# Patient Record
Sex: FEMALE | Race: WHITE | NOT HISPANIC OR LATINO | Employment: OTHER | URBAN - NONMETROPOLITAN AREA
[De-identification: names, ages, dates, MRNs, and addresses within clinical notes are randomized per-mention and may not be internally consistent; named-entity substitution may affect disease eponyms.]

---

## 2017-01-12 ENCOUNTER — COMMUNICATION - GICH (OUTPATIENT)
Dept: FAMILY MEDICINE | Facility: OTHER | Age: 82
End: 2017-01-12

## 2017-01-12 DIAGNOSIS — I10 ESSENTIAL (PRIMARY) HYPERTENSION: ICD-10-CM

## 2017-01-16 ENCOUNTER — OFFICE VISIT - GICH (OUTPATIENT)
Dept: FAMILY MEDICINE | Facility: OTHER | Age: 82
End: 2017-01-16

## 2017-01-16 DIAGNOSIS — L57.0 ACTINIC KERATOSIS: ICD-10-CM

## 2017-01-16 DIAGNOSIS — F34.1 DYSTHYMIC DISORDER: ICD-10-CM

## 2017-01-16 DIAGNOSIS — R10.13 EPIGASTRIC PAIN: ICD-10-CM

## 2017-01-16 DIAGNOSIS — I10 ESSENTIAL (PRIMARY) HYPERTENSION: ICD-10-CM

## 2017-01-16 LAB
A/G RATIO - HISTORICAL: 1.1 (ref 1–2)
ABSOLUTE BASOPHILS - HISTORICAL: 0.1 THOU/CU MM
ABSOLUTE EOSINOPHILS - HISTORICAL: 0.3 THOU/CU MM
ABSOLUTE LYMPHOCYTES - HISTORICAL: 1.5 THOU/CU MM (ref 0.9–2.9)
ABSOLUTE MONOCYTES - HISTORICAL: 0.6 THOU/CU MM
ABSOLUTE NEUTROPHILS - HISTORICAL: 5.5 THOU/CU MM (ref 1.7–7)
ALBUMIN SERPL-MCNC: 3.7 G/DL (ref 3.5–5.7)
ALP SERPL-CCNC: 83 IU/L (ref 34–104)
ALT (SGPT) - HISTORICAL: 15 IU/L (ref 7–52)
ANION GAP - HISTORICAL: 11 (ref 5–18)
AST SERPL-CCNC: 14 IU/L (ref 13–39)
BASOPHILS # BLD AUTO: 0.9 %
BILIRUB SERPL-MCNC: 0.3 MG/DL (ref 0.3–1)
BUN SERPL-MCNC: 26 MG/DL (ref 7–25)
BUN/CREAT RATIO - HISTORICAL: 29
CALCIUM SERPL-MCNC: 9.6 MG/DL (ref 8.6–10.3)
CHLORIDE SERPLBLD-SCNC: 106 MMOL/L (ref 98–107)
CO2 SERPL-SCNC: 24 MMOL/L (ref 21–31)
CREAT SERPL-MCNC: 0.91 MG/DL (ref 0.7–1.3)
EOSINOPHIL NFR BLD AUTO: 4 %
ERYTHROCYTE [DISTWIDTH] IN BLOOD BY AUTOMATED COUNT: 15.5 % (ref 11.5–15.5)
GFR IF NOT AFRICAN AMERICAN - HISTORICAL: 59 ML/MIN/1.73M2
GLOBULIN - HISTORICAL: 3.3 G/DL (ref 2–3.7)
GLUCOSE SERPL-MCNC: 132 MG/DL (ref 70–105)
HCT VFR BLD AUTO: 39.9 % (ref 33–51)
HEMOGLOBIN: 12.5 G/DL (ref 12–16)
LYMPHOCYTES NFR BLD AUTO: 18.8 % (ref 20–44)
MCH RBC QN AUTO: 27 PG (ref 26–34)
MCHC RBC AUTO-ENTMCNC: 31.2 G/DL (ref 32–36)
MCV RBC AUTO: 86 FL (ref 80–100)
MONOCYTES NFR BLD AUTO: 7.7 %
NEUTROPHILS NFR BLD AUTO: 68.6 % (ref 42–72)
PLATELET # BLD AUTO: 277 THOU/CU MM (ref 140–440)
PMV BLD: 7.3 FL (ref 6.5–11)
POTASSIUM SERPL-SCNC: 4.7 MMOL/L (ref 3.5–5.1)
PROT SERPL-MCNC: 7 G/DL (ref 6.4–8.9)
RED BLOOD COUNT - HISTORICAL: 4.62 MIL/CU MM (ref 4–5.2)
SODIUM SERPL-SCNC: 141 MMOL/L (ref 133–143)
WHITE BLOOD COUNT - HISTORICAL: 8 THOU/CU MM (ref 4.5–11)

## 2017-01-16 ASSESSMENT — PATIENT HEALTH QUESTIONNAIRE - PHQ9: SUM OF ALL RESPONSES TO PHQ QUESTIONS 1-9: 13

## 2017-02-13 ENCOUNTER — OFFICE VISIT - GICH (OUTPATIENT)
Dept: FAMILY MEDICINE | Facility: OTHER | Age: 82
End: 2017-02-13

## 2017-02-13 DIAGNOSIS — F32.9 MAJOR DEPRESSIVE DISORDER, SINGLE EPISODE: ICD-10-CM

## 2017-02-13 ASSESSMENT — PATIENT HEALTH QUESTIONNAIRE - PHQ9: SUM OF ALL RESPONSES TO PHQ QUESTIONS 1-9: 13

## 2017-03-06 ENCOUNTER — OFFICE VISIT - GICH (OUTPATIENT)
Dept: FAMILY MEDICINE | Facility: OTHER | Age: 82
End: 2017-03-06

## 2017-03-06 DIAGNOSIS — R42 DIZZINESS AND GIDDINESS: ICD-10-CM

## 2017-03-06 DIAGNOSIS — F34.1 DYSTHYMIC DISORDER: ICD-10-CM

## 2017-03-06 DIAGNOSIS — I10 ESSENTIAL (PRIMARY) HYPERTENSION: ICD-10-CM

## 2017-03-10 ENCOUNTER — COMMUNICATION - GICH (OUTPATIENT)
Dept: FAMILY MEDICINE | Facility: OTHER | Age: 82
End: 2017-03-10

## 2017-03-13 ENCOUNTER — OFFICE VISIT - GICH (OUTPATIENT)
Dept: FAMILY MEDICINE | Facility: OTHER | Age: 82
End: 2017-03-13

## 2017-03-13 DIAGNOSIS — I10 ESSENTIAL (PRIMARY) HYPERTENSION: ICD-10-CM

## 2017-03-28 ENCOUNTER — COMMUNICATION - GICH (OUTPATIENT)
Dept: FAMILY MEDICINE | Facility: OTHER | Age: 82
End: 2017-03-28

## 2017-04-03 ENCOUNTER — HOSPITAL ENCOUNTER (OUTPATIENT)
Dept: PHYSICAL THERAPY | Facility: OTHER | Age: 82
Setting detail: THERAPIES SERIES
End: 2017-04-03
Attending: FAMILY MEDICINE

## 2017-04-03 DIAGNOSIS — I10 ESSENTIAL (PRIMARY) HYPERTENSION: ICD-10-CM

## 2017-04-05 ENCOUNTER — HOSPITAL ENCOUNTER (OUTPATIENT)
Dept: PHYSICAL THERAPY | Facility: OTHER | Age: 82
Setting detail: THERAPIES SERIES
End: 2017-04-05
Attending: FAMILY MEDICINE

## 2017-04-10 ENCOUNTER — HOSPITAL ENCOUNTER (OUTPATIENT)
Dept: PHYSICAL THERAPY | Facility: OTHER | Age: 82
Setting detail: THERAPIES SERIES
End: 2017-04-10
Attending: FAMILY MEDICINE

## 2017-04-12 ENCOUNTER — HISTORY (OUTPATIENT)
Dept: EMERGENCY MEDICINE | Facility: OTHER | Age: 82
End: 2017-04-12

## 2017-04-13 ENCOUNTER — AMBULATORY - GICH (OUTPATIENT)
Dept: ORTHOPEDICS | Facility: OTHER | Age: 82
End: 2017-04-13

## 2017-04-13 ENCOUNTER — HISTORY (OUTPATIENT)
Dept: ORTHOPEDICS | Facility: OTHER | Age: 82
End: 2017-04-13

## 2017-04-13 ENCOUNTER — COMMUNICATION - GICH (OUTPATIENT)
Dept: ORTHOPEDICS | Facility: OTHER | Age: 82
End: 2017-04-13

## 2017-04-17 ENCOUNTER — OFFICE VISIT - GICH (OUTPATIENT)
Dept: ORTHOPEDICS | Facility: OTHER | Age: 82
End: 2017-04-17

## 2017-04-17 ENCOUNTER — HISTORY (OUTPATIENT)
Dept: ORTHOPEDICS | Facility: OTHER | Age: 82
End: 2017-04-17

## 2017-04-17 DIAGNOSIS — S52.531D CLOSED COLLES' FRACTURE OF RIGHT RADIUS WITH ROUTINE HEALING: ICD-10-CM

## 2017-04-18 ENCOUNTER — HISTORY (OUTPATIENT)
Dept: SURGERY | Facility: OTHER | Age: 82
End: 2017-04-18

## 2017-04-20 ENCOUNTER — AMBULATORY - GICH (OUTPATIENT)
Dept: RADIOLOGY | Facility: OTHER | Age: 82
End: 2017-04-20

## 2017-04-20 ENCOUNTER — TRANSFERRED RECORDS (OUTPATIENT)
Dept: HEALTH INFORMATION MANAGEMENT | Facility: CLINIC | Age: 82
End: 2017-04-20

## 2017-04-20 ENCOUNTER — AMBULATORY - GICH (OUTPATIENT)
Dept: FAMILY MEDICINE | Facility: OTHER | Age: 82
End: 2017-04-20

## 2017-04-20 DIAGNOSIS — I10 ESSENTIAL (PRIMARY) HYPERTENSION: ICD-10-CM

## 2017-04-20 DIAGNOSIS — S62.101D: ICD-10-CM

## 2017-04-20 DIAGNOSIS — S52.90XA CLOSED FRACTURE OF FOREARM: ICD-10-CM

## 2017-04-20 LAB
INR - HISTORICAL: 1.1
PROTIME - HISTORICAL: 11.3 SEC (ref 11.9–15.2)

## 2017-04-21 ENCOUNTER — HISTORY (OUTPATIENT)
Dept: SURGERY | Facility: OTHER | Age: 82
End: 2017-04-21

## 2017-04-21 ENCOUNTER — HOSPITAL ENCOUNTER (OUTPATIENT)
Dept: SURGERY | Facility: OTHER | Age: 82
Discharge: HOME | End: 2017-04-21
Attending: ORTHOPAEDIC SURGERY | Admitting: ORTHOPAEDIC SURGERY

## 2017-04-21 ENCOUNTER — SURGERY (OUTPATIENT)
Dept: SURGERY | Facility: OTHER | Age: 82
End: 2017-04-21

## 2017-04-21 DIAGNOSIS — Z96.7 PRESENCE OF OTHER BONE AND TENDON IMPLANTS: ICD-10-CM

## 2017-04-21 DIAGNOSIS — Z87.81 PERSONAL HISTORY OF HEALED TRAUMATIC FRACTURE: ICD-10-CM

## 2017-04-21 DIAGNOSIS — S52.90XA CLOSED FRACTURE OF FOREARM: ICD-10-CM

## 2017-05-01 ENCOUNTER — OFFICE VISIT - GICH (OUTPATIENT)
Dept: ORTHOPEDICS | Facility: OTHER | Age: 82
End: 2017-05-01

## 2017-05-01 ENCOUNTER — HISTORY (OUTPATIENT)
Dept: ORTHOPEDICS | Facility: OTHER | Age: 82
End: 2017-05-01

## 2017-05-01 DIAGNOSIS — Z96.7 PRESENCE OF OTHER BONE AND TENDON IMPLANTS: ICD-10-CM

## 2017-05-01 DIAGNOSIS — Z87.81 PERSONAL HISTORY OF HEALED TRAUMATIC FRACTURE: ICD-10-CM

## 2017-05-15 ENCOUNTER — AMBULATORY - GICH (OUTPATIENT)
Dept: SCHEDULING | Facility: OTHER | Age: 82
End: 2017-05-15

## 2017-05-15 ENCOUNTER — COMMUNICATION - GICH (OUTPATIENT)
Dept: FAMILY MEDICINE | Facility: OTHER | Age: 82
End: 2017-05-15

## 2017-05-15 DIAGNOSIS — F34.1 DYSTHYMIC DISORDER: ICD-10-CM

## 2017-05-24 ENCOUNTER — AMBULATORY - GICH (OUTPATIENT)
Dept: ORTHOPEDICS | Facility: OTHER | Age: 82
End: 2017-05-24

## 2017-05-24 DIAGNOSIS — Z87.81 PERSONAL HISTORY OF HEALED TRAUMATIC FRACTURE: ICD-10-CM

## 2017-05-24 DIAGNOSIS — Z96.7 PRESENCE OF OTHER BONE AND TENDON IMPLANTS: ICD-10-CM

## 2017-05-26 ENCOUNTER — AMBULATORY - GICH (OUTPATIENT)
Dept: ORTHOPEDICS | Facility: OTHER | Age: 82
End: 2017-05-26

## 2017-05-26 ENCOUNTER — OFFICE VISIT - GICH (OUTPATIENT)
Dept: ORTHOPEDICS | Facility: OTHER | Age: 82
End: 2017-05-26

## 2017-05-26 ENCOUNTER — HOSPITAL ENCOUNTER (OUTPATIENT)
Dept: RADIOLOGY | Facility: OTHER | Age: 82
End: 2017-05-26
Attending: ORTHOPAEDIC SURGERY

## 2017-05-26 ENCOUNTER — HISTORY (OUTPATIENT)
Dept: ORTHOPEDICS | Facility: OTHER | Age: 82
End: 2017-05-26

## 2017-05-26 DIAGNOSIS — Z96.7 PRESENCE OF OTHER BONE AND TENDON IMPLANTS: ICD-10-CM

## 2017-05-26 DIAGNOSIS — Z87.81 PERSONAL HISTORY OF HEALED TRAUMATIC FRACTURE: ICD-10-CM

## 2017-06-15 ENCOUNTER — AMBULATORY - GICH (OUTPATIENT)
Dept: ORTHOPEDICS | Facility: OTHER | Age: 82
End: 2017-06-15

## 2017-06-15 DIAGNOSIS — Z87.81 PERSONAL HISTORY OF HEALED TRAUMATIC FRACTURE: ICD-10-CM

## 2017-06-15 DIAGNOSIS — Z96.7 PRESENCE OF OTHER BONE AND TENDON IMPLANTS: ICD-10-CM

## 2017-06-23 ENCOUNTER — OFFICE VISIT - GICH (OUTPATIENT)
Dept: ORTHOPEDICS | Facility: OTHER | Age: 82
End: 2017-06-23

## 2017-06-23 ENCOUNTER — HOSPITAL ENCOUNTER (OUTPATIENT)
Dept: RADIOLOGY | Facility: OTHER | Age: 82
End: 2017-06-23
Attending: ORTHOPAEDIC SURGERY

## 2017-06-23 ENCOUNTER — HISTORY (OUTPATIENT)
Dept: ORTHOPEDICS | Facility: OTHER | Age: 82
End: 2017-06-23

## 2017-06-23 DIAGNOSIS — Z87.81 PERSONAL HISTORY OF HEALED TRAUMATIC FRACTURE: ICD-10-CM

## 2017-06-23 DIAGNOSIS — Z96.7 PRESENCE OF OTHER BONE AND TENDON IMPLANTS: ICD-10-CM

## 2017-06-27 ENCOUNTER — HISTORY (OUTPATIENT)
Dept: EMERGENCY MEDICINE | Facility: OTHER | Age: 82
End: 2017-06-27

## 2017-06-27 ENCOUNTER — TRANSFERRED RECORDS (OUTPATIENT)
Dept: HEALTH INFORMATION MANAGEMENT | Facility: CLINIC | Age: 82
End: 2017-06-27

## 2017-07-18 ENCOUNTER — COMMUNICATION - GICH (OUTPATIENT)
Dept: FAMILY MEDICINE | Facility: OTHER | Age: 82
End: 2017-07-18

## 2017-07-18 DIAGNOSIS — I10 ESSENTIAL (PRIMARY) HYPERTENSION: ICD-10-CM

## 2017-08-21 ENCOUNTER — HISTORY (OUTPATIENT)
Dept: FAMILY MEDICINE | Facility: OTHER | Age: 82
End: 2017-08-21

## 2017-08-21 ENCOUNTER — TRANSFERRED RECORDS (OUTPATIENT)
Dept: HEALTH INFORMATION MANAGEMENT | Facility: CLINIC | Age: 82
End: 2017-08-21

## 2017-08-21 ENCOUNTER — OFFICE VISIT - GICH (OUTPATIENT)
Dept: FAMILY MEDICINE | Facility: OTHER | Age: 82
End: 2017-08-21

## 2017-08-21 ENCOUNTER — AMBULATORY - GICH (OUTPATIENT)
Dept: FAMILY MEDICINE | Facility: OTHER | Age: 82
End: 2017-08-21

## 2017-08-21 DIAGNOSIS — F34.1 DYSTHYMIC DISORDER: ICD-10-CM

## 2017-08-21 DIAGNOSIS — R93.5 ABNORMAL FINDINGS ON DIAGNOSTIC IMAGING OF OTHER ABDOMINAL REGIONS, INCLUDING RETROPERITONEUM: ICD-10-CM

## 2017-08-21 ASSESSMENT — PATIENT HEALTH QUESTIONNAIRE - PHQ9: SUM OF ALL RESPONSES TO PHQ QUESTIONS 1-9: 9

## 2017-08-22 ENCOUNTER — HOSPITAL ENCOUNTER (OUTPATIENT)
Dept: RADIOLOGY | Facility: OTHER | Age: 82
End: 2017-08-22
Attending: FAMILY MEDICINE

## 2017-08-22 ENCOUNTER — TRANSFERRED RECORDS (OUTPATIENT)
Dept: HEALTH INFORMATION MANAGEMENT | Facility: CLINIC | Age: 82
End: 2017-08-22

## 2017-08-22 ENCOUNTER — MEDICAL CORRESPONDENCE (OUTPATIENT)
Dept: HEALTH INFORMATION MANAGEMENT | Facility: CLINIC | Age: 82
End: 2017-08-22

## 2017-08-22 ENCOUNTER — AMBULATORY - GICH (OUTPATIENT)
Dept: FAMILY MEDICINE | Facility: OTHER | Age: 82
End: 2017-08-22

## 2017-08-22 DIAGNOSIS — R93.5 ABNORMAL FINDINGS ON DIAGNOSTIC IMAGING OF OTHER ABDOMINAL REGIONS, INCLUDING RETROPERITONEUM: ICD-10-CM

## 2017-08-24 ENCOUNTER — CARE COORDINATION (OUTPATIENT)
Dept: GASTROENTEROLOGY | Facility: CLINIC | Age: 82
End: 2017-08-24

## 2017-08-24 NOTE — PROGRESS NOTES
[facility] Intake:    Referring/Requesting provider and Health care System: Dr Daniel Beyer from [facility] contact - Name Fanny, Phone [phone] and Fax number [phone]     Requested provider (if specified): any     Has patient been evaluated in clinic or had a procedure [facility] provider in the last 5 years: no       Indication/Diagnosis for consultation: 1.3cm low attenuation lesion adjacent to tail of pancreas, possible cystic neoplasm     Is diagnosis on list of approved diagnosis: yes     Has patient been evaluated by another Gastroenterologist? No, but the staff general surgeon at [facility] looked at pt's CT     Imaging completed:     CT scan     yes   MRI       no     Procedures:     Upper Endoscopy/EGD no     Endoscopic Ultrasound/EUS            no     ERCP no     Colonoscopy no     Are images able/being pushed to our system? no     Is patient aware of request for clinc consultation and ok to be contacted to schedule? Yes     Referral Received: 8/24/2017    Hard Copy chart created/ Records received: 9/7/2017    MD review date:                             Clinical History (per RN review of records provided):   Referred for 1.3 cm abnormality in the tail of pancreas found on CT.       Recommendations/Orders:    Hard copy chart review by Dr. Laboy  1. Clinic   2. Block time in the OR for EUS  Within 1 month.     9/13/2017: Left message for Mervat to call back to discuss the plan.       Celine SUBRAMANIAN, RN Coordinator  Dr. Ventura, Dr. Anderson & Dr. Laboy   [facility]  [phone]

## 2017-08-28 ENCOUNTER — HISTORY (OUTPATIENT)
Dept: FAMILY MEDICINE | Facility: OTHER | Age: 82
End: 2017-08-28

## 2017-08-28 ENCOUNTER — OFFICE VISIT - GICH (OUTPATIENT)
Dept: FAMILY MEDICINE | Facility: OTHER | Age: 82
End: 2017-08-28

## 2017-08-28 DIAGNOSIS — K86.9 DISEASE OF PANCREAS: ICD-10-CM

## 2017-08-29 NOTE — PROGRESS NOTES
One disc received and taken down to be uploaded  CT from 4/22/17 and 8/22/2017      23 Pages of Medical records received.  Hard copy folder created  Handed over to RNCC on 8/30    SR 08/29/2017  219p

## 2017-09-14 ENCOUNTER — CARE COORDINATION (OUTPATIENT)
Dept: GASTROENTEROLOGY | Facility: CLINIC | Age: 82
End: 2017-09-14

## 2017-09-14 ENCOUNTER — COMMUNICATION - GICH (OUTPATIENT)
Dept: FAMILY MEDICINE | Facility: OTHER | Age: 82
End: 2017-09-14

## 2017-09-14 DIAGNOSIS — K86.9 PANCREATIC LESION: Primary | ICD-10-CM

## 2017-09-14 NOTE — PROGRESS NOTES
Called and spoke to Mervat, she is aware of plan and amenable to plan for clinic and EUS. She is aware will need to get pre-op physical in the event it is decided that she will need EUS. She plans to get pre-op done locally.     She will expect a call for date and time for procedure.     Order placed for EUS.     Celine SUBRAMANIAN RN Coordinator  Dr. Ventura, Dr. Anderson & Dr. Laboy   [facility]  [phone]

## 2017-09-15 ENCOUNTER — CARE COORDINATION (OUTPATIENT)
Dept: GASTROENTEROLOGY | Facility: CLINIC | Age: 82
End: 2017-09-15

## 2017-09-15 NOTE — PROGRESS NOTES
Received call from Angelo: he is looking for procedure.   Advised: explained to his wife yesterday that we would be calling her/him with date and time within the next couple of days.   Verbalized understanding and will expect call with date and time.     Celine SUBRAMANIAN RN Coordinator  Dr. Ventura, Dr. Anderson & Dr. Laboy   [facility]  [phone]

## 2017-09-20 ENCOUNTER — TELEPHONE (OUTPATIENT)
Dept: GASTROENTEROLOGY | Facility: CLINIC | Age: 82
End: 2017-09-20

## 2017-09-20 NOTE — TELEPHONE ENCOUNTER
Angelo and Mervat are informed that she is scheduled on 11/02/2017 for a clinic consult with Dr. Thurman.   They are informed that an EUS may be warranted after clinic consult.   I advised that we can get Mervat in for her H&P here at the PAC office on 11/2 or they can have one done locally for this procedure.  Angelo will be accompanying Mervat to all appointments.   He asked me to send lodging information along with a map to get here.   Their address was confirmed during this call to be current.    SR 09/20/2017  303p

## 2017-10-17 ENCOUNTER — TRANSFERRED RECORDS (OUTPATIENT)
Dept: HEALTH INFORMATION MANAGEMENT | Facility: CLINIC | Age: 82
End: 2017-10-17

## 2017-10-17 ENCOUNTER — AMBULATORY - GICH (OUTPATIENT)
Dept: FAMILY MEDICINE | Facility: OTHER | Age: 82
End: 2017-10-17

## 2017-10-17 ENCOUNTER — OFFICE VISIT - GICH (OUTPATIENT)
Dept: FAMILY MEDICINE | Facility: OTHER | Age: 82
End: 2017-10-17

## 2017-10-17 ENCOUNTER — HISTORY (OUTPATIENT)
Dept: FAMILY MEDICINE | Facility: OTHER | Age: 82
End: 2017-10-17

## 2017-10-17 DIAGNOSIS — F34.1 DYSTHYMIC DISORDER: ICD-10-CM

## 2017-10-17 DIAGNOSIS — I10 ESSENTIAL (PRIMARY) HYPERTENSION: ICD-10-CM

## 2017-10-17 DIAGNOSIS — K86.9 DISEASE OF PANCREAS: ICD-10-CM

## 2017-10-25 ENCOUNTER — TELEPHONE (OUTPATIENT)
Dept: GASTROENTEROLOGY | Facility: CLINIC | Age: 82
End: 2017-10-25

## 2017-10-25 NOTE — TELEPHONE ENCOUNTER
Attempted to reach patient regarding upcoming appointment 11/2 with Dr. Thurman. Patient unavailable, unable to leave a message.

## 2017-11-01 ENCOUNTER — TELEPHONE (OUTPATIENT)
Dept: GASTROENTEROLOGY | Facility: CLINIC | Age: 82
End: 2017-11-01

## 2017-11-02 ENCOUNTER — OFFICE VISIT (OUTPATIENT)
Dept: GASTROENTEROLOGY | Facility: CLINIC | Age: 82
End: 2017-11-02

## 2017-11-02 ENCOUNTER — AMBULATORY - GICH (OUTPATIENT)
Dept: SCHEDULING | Facility: OTHER | Age: 82
End: 2017-11-02

## 2017-11-02 VITALS
SYSTOLIC BLOOD PRESSURE: 135 MMHG | HEART RATE: 70 BPM | TEMPERATURE: 97.7 F | WEIGHT: 160.2 LBS | OXYGEN SATURATION: 99 % | DIASTOLIC BLOOD PRESSURE: 67 MMHG | BODY MASS INDEX: 28.39 KG/M2 | HEIGHT: 63 IN

## 2017-11-02 DIAGNOSIS — K86.2 PANCREATIC CYST: Primary | ICD-10-CM

## 2017-11-02 RX ORDER — MIRTAZAPINE 15 MG/1
TABLET, FILM COATED ORAL
COMMUNITY
End: 2018-07-16

## 2017-11-02 RX ORDER — METOPROLOL SUCCINATE 50 MG/1
25 TABLET, EXTENDED RELEASE ORAL
COMMUNITY
End: 2018-07-16

## 2017-11-02 RX ORDER — ESOMEPRAZOLE MAGNESIUM 40 MG/1
GRANULE, DELAYED RELEASE ORAL
COMMUNITY
End: 2018-04-18

## 2017-11-02 RX ORDER — VENLAFAXINE HYDROCHLORIDE 37.5 MG/1
37.5 CAPSULE, EXTENDED RELEASE ORAL 2 TIMES DAILY
COMMUNITY
End: 2018-07-16

## 2017-11-02 RX ORDER — ESOMEPRAZOLE MAGNESIUM 40 MG/1
20 CAPSULE, DELAYED RELEASE ORAL
COMMUNITY
Start: 2017-01-16 | End: 2018-12-14

## 2017-11-02 ASSESSMENT — ENCOUNTER SYMPTOMS
BACK PAIN: 1
CHILLS: 0
HALLUCINATIONS: 0
DIZZINESS: 0
WEIGHT LOSS: 0
MUSCLE CRAMPS: 0
MYALGIAS: 1
DISTURBANCES IN COORDINATION: 0
FATIGUE: 1
LOSS OF CONSCIOUSNESS: 0
ARTHRALGIAS: 1
MUSCLE WEAKNESS: 0
NIGHT SWEATS: 0
NECK PAIN: 0
WEIGHT GAIN: 0
STIFFNESS: 1
TREMORS: 0
NUMBNESS: 0
MEMORY LOSS: 1
POLYDIPSIA: 0
SPEECH CHANGE: 0
JOINT SWELLING: 0
POLYPHAGIA: 0
WEAKNESS: 0
TINGLING: 1
HEADACHES: 0
PARALYSIS: 0
DECREASED APPETITE: 0
FEVER: 0
ALTERED TEMPERATURE REGULATION: 0
INCREASED ENERGY: 1
SEIZURES: 0

## 2017-11-02 ASSESSMENT — PAIN SCALES - GENERAL: PAINLEVEL: NO PAIN (0)

## 2017-11-02 NOTE — NURSING NOTE
Reviewed today's consult and answered patient's questions.  Patient verbalized understanding and agreed to call me with any questions / concerns in the future and confirmed having our contact information.  Please see patient instructions below.
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
## 2017-11-02 NOTE — PATIENT INSTRUCTIONS
1.  MRI  - Please schedule this at your clinic.      Follow up:      Please call with any questions or concerns regarding your clinic visit today.    It is a pleasure being involved in your health care.    Contacts post-consultation depending on your need:    Schedule Clinic Appointments       [phone] # 1   M-F 7:30 - 5 pm    Celine SUBRAMANIAN RN Coordinator           [phone] # 3   M-F 8:00 - 5 pm  (Triage hours)     Brandon TORRES RN Coordinator               [phone] # 3   M-F 8:00 - 5 pm  (Triage hours)    OR Procedure Scheduling              [phone]    My Chart is available 24 hours a day and is a secure way to access your records and communicate with your care team.  I strongly recommend signing up if you haven't already done so, if you are comfortable with computers.  If you would like to inquire about this or are having problems with My Chart access, you may call [phone] or go online at [email or web address].  Please allow at least 24 hours for a response and extra time on weekends and Holidays.
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
## 2017-11-02 NOTE — MR AVS SNAPSHOT
After Visit Summary   11/2/2017    Mervat Lockett    MRN: [id number]           Patient Information     Date Of Birth          1/2/1932        Visit Information        Provider Department      11/2/2017 9:00 AM Guru Misha Laboy MD [facility] Pancreas and Biliary        Today's Diagnoses     Pancreatic cyst    -  1      Care Instructions    1.  MRI  - Please schedule this at your clinic.      Follow up:      Please call with any questions or concerns regarding your clinic visit today.    It is a pleasure being involved in your health care.    Contacts post-consultation depending on your need:    Schedule Clinic Appointments       [phone] # 1   M-F 7:30 - 5 pm    Celine USBRAMANIAN RN Coordinator           [phone] # 3   M-F 8:00 - 5 pm  (Triage hours)     Brandon TORRES RN Coordinator               [phone] # 3   M-F 8:00 - 5 pm  (Triage hours)    OR Procedure Scheduling              [phone]    My Chart is available 24 hours a day and is a secure way to access your records and communicate with your care team.  I strongly recommend signing up if you haven't already done so, if you are comfortable with computers.  If you would like to inquire about this or are having problems with My Chart access, you may call [phone] or go online at [email or web address].  Please allow at least 24 hours for a response and extra time on weekends and Holidays.          Follow-ups after your visit        Future tests that were ordered for you today     Open Future Orders        Priority Expected Expires Ordered    MR Abdomen MRCP w/o & w Contrast Routine  11/2/2018 11/2/2017            Who to contact     Please call your clinic at [phone] to:    Ask questions about your health    Make or cancel appointments    Discuss your medicines    Learn about your test results    Speak to your doctor   If you have compliments or concerns about an experience at your clinic, or if you wish to file a  "complaint, please contact [facility] Patient Relations at [phone] or email us at [email or web address]         Additional Information About Your Visit        Share Practicehart Information     [employer] gives you secure access to your electronic health record. If you see a primary care provider, you can also send messages to your care team and make appointments. If you have questions, please call your primary care clinic.  If you do not have a primary care provider, please call [phone] and they will assist you.      [employer] is an electronic gateway that provides easy, online access to your medical records. With [employer], you can request a clinic appointment, read your test results, renew a prescription or communicate with your care team.     To access your existing account, please contact your [facility] or call [phone] for assistance.        Care EveryWhere ID     This is your Care EveryWhere ID. This could be used by other organizations to access your [place] medical records  [id number]        Your Vitals Were     Pulse Temperature Height Pulse Oximetry BMI (Body Mass Index)       70 97.7  F (36.5  C) (Oral) 1.6 m (5' 3\") 99% 28.38 kg/m2        Blood Pressure from Last 3 Encounters:   11/02/17 135/67    Weight from Last 3 Encounters:   11/02/17 72.7 kg (160 lb 3.2 oz)              We Performed the Following     Comprehensive metabolic panel        Primary Care Provider Office Phone # Fax #    Daniel WONG Pepito [phone]       [address] [address]        Equal Access to Services     MARIAA CIFUENTES : Hadii valarie mazariegoso Solynette, waaxda luqadaha, qaybta kaalmada adeegyabren, coby kay. So [facility] [phone].    ATENCIÓN: Si habla español, tiene a echeverria disposición servicios gratuitos de asistencia lingüística. Llame al [phone].    We comply with applicable " federal civil rights laws and [place] laws. We do not discriminate on the basis of race, color, national origin, age, disability, sex, sexual orientation, or gender identity.            Thank you!     Thank you for choosing [facility] PANCREAS AND BILIARY  for your care. Our goal is always to provide you with excellent care. Hearing back from our patients is one way we can continue to improve our services. Please take a few minutes to complete the written survey that you may receive in the mail after your visit with us. Thank you!             Your Updated Medication List - Protect others around you: Learn how to safely use, store and throw away your medicines at www.disposemymeds.org.          This list is accurate as of: 11/2/17 10:01 AM.  Always use your most recent med list.                   Brand Name Dispense Instructions for use Diagnosis    * Esomeprazole Magnesium 40 MG Pack           * esomeprazole 40 MG CR capsule    nexIUM     Take 40 mg by mouth        metoprolol 50 MG 24 hr tablet    TOPROL-XL          mirtazapine 15 MG tablet    REMERON          venlafaxine 37.5 MG 24 hr capsule    EFFEXOR-XR          * Notice:  This list has 2 medication(s) that are the same as other medications prescribed for you. Read the directions carefully, and ask your doctor or other care provider to review them with you.

## 2017-11-02 NOTE — NURSING NOTE
"Chief Complaint   Patient presents with     Clinic Care Coordination - Initial     EUS discussion       Vitals:    11/02/17 0824   BP: 135/67   BP Location: Right arm   Patient Position: Chair   Cuff Size: Adult Regular   Pulse: 70   Temp: 97.7  F (36.5  C)   TempSrc: Oral   SpO2: 99%   Weight: 160 lb 3.2 oz   Height: 5' 3\"       Body mass index is 28.38 kg/(m^2).    Mariposa Jeronimo RN                        "

## 2017-11-06 ENCOUNTER — COMMUNICATION - GICH (OUTPATIENT)
Dept: FAMILY MEDICINE | Facility: OTHER | Age: 82
End: 2017-11-06

## 2017-11-06 DIAGNOSIS — F41.8 OTHER SPECIFIED ANXIETY DISORDERS: ICD-10-CM

## 2017-11-06 DIAGNOSIS — R93.5 ABNORMAL FINDINGS ON DIAGNOSTIC IMAGING OF OTHER ABDOMINAL REGIONS, INCLUDING RETROPERITONEUM: ICD-10-CM

## 2017-11-13 ENCOUNTER — AMBULATORY - GICH (OUTPATIENT)
Dept: LAB | Facility: OTHER | Age: 82
End: 2017-11-13

## 2017-11-13 ENCOUNTER — HOSPITAL ENCOUNTER (OUTPATIENT)
Dept: RADIOLOGY | Facility: OTHER | Age: 82
End: 2017-11-13
Attending: FAMILY MEDICINE

## 2017-11-13 DIAGNOSIS — R93.5 ABNORMAL FINDINGS ON DIAGNOSTIC IMAGING OF OTHER ABDOMINAL REGIONS, INCLUDING RETROPERITONEUM: ICD-10-CM

## 2017-11-13 LAB
CREAT SERPL-MCNC: 0.85 MG/DL (ref 0.7–1.3)
GFR IF NOT AFRICAN AMERICAN - HISTORICAL: >60 ML/MIN/1.73M2

## 2017-11-27 ENCOUNTER — OFFICE VISIT - GICH (OUTPATIENT)
Dept: FAMILY MEDICINE | Facility: OTHER | Age: 82
End: 2017-11-27

## 2017-11-27 ENCOUNTER — HISTORY (OUTPATIENT)
Dept: FAMILY MEDICINE | Facility: OTHER | Age: 82
End: 2017-11-27

## 2017-11-27 DIAGNOSIS — R53.83 OTHER FATIGUE: ICD-10-CM

## 2017-11-27 DIAGNOSIS — R19.7 DIARRHEA: ICD-10-CM

## 2017-11-27 DIAGNOSIS — R30.0 DYSURIA: ICD-10-CM

## 2017-11-27 LAB
ABSOLUTE BASOPHILS - HISTORICAL: 0 THOU/CU MM
ABSOLUTE EOSINOPHILS - HISTORICAL: 0.3 THOU/CU MM
ABSOLUTE IMMATURE GRANULOCYTES(METAS,MYELOS,PROS) - HISTORICAL: 0 THOU/CU MM
ABSOLUTE LYMPHOCYTES - HISTORICAL: 1.5 THOU/CU MM (ref 0.9–2.9)
ABSOLUTE MONOCYTES - HISTORICAL: 0.7 THOU/CU MM
ABSOLUTE NEUTROPHILS - HISTORICAL: 5.2 THOU/CU MM (ref 1.7–7)
ANION GAP - HISTORICAL: 9 (ref 5–18)
BASOPHILS # BLD AUTO: 0.4 %
BUN SERPL-MCNC: 22 MG/DL (ref 7–25)
BUN/CREAT RATIO - HISTORICAL: 22
CALCIUM SERPL-MCNC: 9.4 MG/DL (ref 8.6–10.3)
CHLORIDE SERPLBLD-SCNC: 106 MMOL/L (ref 98–107)
CO2 SERPL-SCNC: 26 MMOL/L (ref 21–31)
CREAT SERPL-MCNC: 0.98 MG/DL (ref 0.7–1.3)
EOSINOPHIL NFR BLD AUTO: 4.1 %
ERYTHROCYTE [DISTWIDTH] IN BLOOD BY AUTOMATED COUNT: 15.7 % (ref 11.5–15.5)
GFR IF NOT AFRICAN AMERICAN - HISTORICAL: 54 ML/MIN/1.73M2
GLUCOSE SERPL-MCNC: 116 MG/DL (ref 70–105)
HCT VFR BLD AUTO: 36.9 % (ref 33–51)
HEMOGLOBIN: 11.1 G/DL (ref 12–16)
IMMATURE GRANULOCYTES(METAS,MYELOS,PROS) - HISTORICAL: 0.5 %
LYMPHOCYTES NFR BLD AUTO: 19.2 % (ref 20–44)
MCH RBC QN AUTO: 25.2 PG (ref 26–34)
MCHC RBC AUTO-ENTMCNC: 30.1 G/DL (ref 32–36)
MCV RBC AUTO: 84 FL (ref 80–100)
MONOCYTES NFR BLD AUTO: 8.7 %
NEUTROPHILS NFR BLD AUTO: 67.1 % (ref 42–72)
PLATELET # BLD AUTO: 294 THOU/CU MM (ref 140–440)
PMV BLD: 10.2 FL (ref 6.5–11)
POTASSIUM SERPL-SCNC: 4.5 MMOL/L (ref 3.5–5.1)
RED BLOOD COUNT - HISTORICAL: 4.41 MIL/CU MM (ref 4–5.2)
SODIUM SERPL-SCNC: 141 MMOL/L (ref 133–143)
WHITE BLOOD COUNT - HISTORICAL: 7.7 THOU/CU MM (ref 4.5–11)

## 2017-11-27 ASSESSMENT — PATIENT HEALTH QUESTIONNAIRE - PHQ9: SUM OF ALL RESPONSES TO PHQ QUESTIONS 1-9: 6

## 2017-12-27 NOTE — PROGRESS NOTES
Patient Information     Patient Name MRN Mervat Leon [id number] Female 1932      Progress Notes by Daniel Beyer MD at 10/17/2017 11:00 AM     Author:  Daniel Beyer MD Service:  (none) Author Type:  Physician     Filed:  10/17/2017 11:58 AM Encounter Date:  10/17/2017 Status:  Signed     :  Daniel Beyer MD (Physician)            ,

## 2017-12-27 NOTE — PROGRESS NOTES
"Patient Information     Patient Name MRN Sex Mervat Morrow [id number] Female 1932      Progress Notes by Gerald Martínez DO at 2017 10:30 AM     Author:  Gerald Martínez DO Service:  (none) Author Type:  Physician     Filed:  2017 10:51 AM Encounter Date:  2017 Status:  Signed     :  Gerald Martínez DO (Physician)            PROGRESS NOTE    SUBJECTIVE:  Mervat Lockett is here for evaluation in regards to right wrist. She is doing very well and feels like the pain is getting better all the time. Lot less discomfort.    OBJECTIVE:  /70  Pulse 72  Ht 1.6 m (5' 3\")  Wt 72.6 kg (160 lb)  BMI 28.34 kg/m2 Body mass index is 28.34 kg/(m^2).    General Appearance: Pleasant female in good appearance, mood and affect.  Alert and orientated times three ( time, date and location).    Skin: Incision site is healing well.    Wrist:  No palpable tenderness.  Motion: Improved range of motion since she's been utilizing it more.    Shoulder:  Motion: full    Elbow:  Flexion: Normal  Extension: Normal  Deep tendon reflexes: Normal    Hand:  Sensation: Normal  Radial and ulnar blood flow:  Normal    Heart: regular rate and rhythm    Lungs: Clear.     Radiographic images from ,  where independently reviewed and discussed with the patient.      Xray:     Films do show improved healing of the distal radius fracture with hardware in place. There is still a small ulnar styloid fracture.    PROCEDURE: XR WRIST W NAVICULAR MINIMUM 3 VIEWS RIGHT  HISTORY: S/P ORIF (open reduction internal fixation) fracture.  COMPARISON: 2017  TECHNIQUE: 4 views right wrist.  FINDINGS: Postoperative changes of interval open reduction internal fixation of the right distal radius is seen. Previously seen dorsal angulation is resolved. The fracture lines are obscured by healing endosteal bone. A tiny ulnar styloid avulsion fracture fragment persists. There is widening of the " scapholunate interval suggesting ligamentous rupture. Mild arthritic changes are present, most pronounced at the first CMC joint.  IMPRESSION: Expected healing of a distal right radius fracture following ORIF.  Electronically Signed By: Jorge Sebastian on 5/26/2017 12:56 PM    ASSESSMENT:    POSTOPERATIVE DIAGNOSIS   Comminuted intraarticular fracture of the distal right radius.      PROCEDURE   Open reduction and internal fixation of the distal right radius utilizing a Synthes stainless steel 2.4 mm VA-LCP 2-column right volar distal radius plate with 3 screws in the shaft and 6 in the distal portion of the plate with a T8 Star Drive screwdriver (DOS 04/21/2017).    PLAN:    She will continue to work on range of motion exercises and strengthening.  No more splint as needed.  Follow-up as needed.  They know to call if there is any other problems.  Questions and concerns answered.    Gerald Martínez D.O.  Orthopaedic Surgeon    [facility]  [address] [place] [address]  Phone [phone] (KNEE)  Fax [phone]    This document was created using computer generated templates and voice activated software.    10:48 AM 6/23/2017

## 2017-12-27 NOTE — PROGRESS NOTES
Patient Information     Patient Name MRN Sex Mervat Morrow [id number] Female 1932      Progress Notes by Daniel Beyer MD at 2017 12:00 PM     Author:  Daniel Beyer MD Service:  (none) Author Type:  Physician     Filed:  2017 12:36 PM Encounter Date:  2017 Status:  Signed     :  Daniel Beyer MD (Physician)            Nursing Notes:   Radha Cheng  2017 12:08 PM  Signed  Patient presents to the clinic with fatigue.  She's not sure if it could be from depression  Radha Cheng LPN....................2017 12:00 PM    Mervat Lockett is a 85 y.o. female who presents for   Chief Complaint     Patient presents with       Fatigue      chronic     HPI: Ms. Lockett comes in stating she had some change in her voiding and thought she might have UTI but those symptoms have resolved; she really is just tired and not motivated to do a lot other than sit around and enjoy the birds. She feels she is depressed and would like some improved motivation. She is presently on venlafaxine 37.5 1/2 twice daily and remeron 7.5 - 15 mg at at bedtime. These have helped some. She describes a mid sternal pain that is not es=xertional- she believes it is anxiety related. She has been sleeping well. She does not exercise regularly. She even has been using a ride cart in stores  Past Medical History:     Diagnosis  Date     Closed fracture of right radius and ulna 2017     Hot flashes     Occasional hot flashes      Hx of being hospitalized     Hospitalization for tachycardia and PVCs      Hx of pregnancy     G5, P4-0-1-4      Menopause     with mild symptoms      Osteoarthritis of knee 2014     Plantar wart      S/P ORIF right distal radius 2017     Past Surgical History:      Procedure  Laterality Date     BLEPHAROPLASTY  06    by Dr. White, left brow lift.        CATARACT REMOVAL      Bilateral cataract extraction - Dr. White       CERVICAL  "POLYPECTOMY      Endocervical polyps       CYSTOCELE REPAIR      Uterine prolapse and vaginal vault prolapse with cystocele       HIP ARTHROPLASTY Right      HIP REPLACEMENT  3/9/09    Left, by Ashish Ivory M.D.       HIP SURGERY Left 2012    repair left hip / [place]        S/P ORIF OF RIGHT DISTAL RADIUS Right 04/21/2017    Synthes stainless steel variable angle volar radial plate with T8 screw heads.       SALPINGO-OOPHORECTOMY  4/16/98    Bilateral salpingo oophorectomy and vaginal vault suspension       TOTAL ABDOMINAL HYSTERECTOMY      Rectocele       Family History       Problem   Relation Age of Onset     Heart Disease  Father      CHF       Thyroid Disease  Daughter      Hypothyroid.       Blood Disease  Daughter      severe anemia with a hgb of less than 5       Good Health  Son      Good Health  Daughter      Thyroid Disease  Daughter      Hypothyroid.       Arthritis  Other      Father's side has rheumatoid arthritis       Current Outpatient Prescriptions       Medication  Sig Dispense Refill     esomeprazole (NEXIUM) 40 mg capsule Take 1 capsule by mouth once daily before a meal. 90 capsule 3     metoprolol tartrate (LOPRESSOR) 50 mg tablet Take 0.5 tablets by mouth 2 times daily. 90 tablet 3     mirtazapine (REMERON) 15 mg tablet Take 0.5-1 tablets by mouth at bedtime. 30 tablet 6     venlafaxine (EFFEXOR) 37.5 mg tablet 1/2 BID 90 tablet 3     No current facility-administered medications for this visit.      Medications have been reviewed by me and are current to the best of my knowledge and ability.    Allergies     Allergen  Reactions     Paxil [Paroxetine] Anxiety         EXAM:   Vitals:     11/27/17 1201   BP: 130/82   Weight: 72.4 kg (159 lb 9.6 oz)   Height: 1.6 m (5' 2.99\")     General Appearance: Pleasant, alert, appropriate appearance for age. No acute distress  Chest/Respiratory Exam: Normal chest wall and respirations. Clear to auscultation.  Cardiovascular Exam: Regular rate and rhythm. " S1, S2, no murmur, click, gallop, or rubs.  ASSESSMENT AND PLAN:    1. Fatigue, unspecified type  Emotionally driven as she suspects- encouraged activity/ will check Electrolytes and CBC/ she had diarrhea after MRI and prep so will check labs/ can try increase venlafaxine to 1 1/2 37.5 daily

## 2017-12-28 NOTE — TELEPHONE ENCOUNTER
Patient Information     Patient Name MRN Mervat Leon [id number] Female 1932      Telephone Encounter by Marleny De La Garza at 11/10/2017 10:52 AM     Author:  Marleny De La Garza Service:  (none) Author Type:  (none)     Filed:  11/10/2017 10:56 AM Encounter Date:  2017 Status:  Signed     :  Marleny De La Garza            Patient is having MRI on Monday. Would like something to help her get through the MRI.  Marleny Wagner ....................  11/10/2017   10:53 AM

## 2017-12-28 NOTE — PROGRESS NOTES
Patient Information     Patient Name MRN Sex Mervat Morrow [id number] Female 1932      Progress Notes by Nani Morris at 2017  8:21 AM     Author:  Nani Morris Service:  (none) Author Type:  Other Clinical Staff     Filed:  2017  8:21 AM Date of Service:  2017  8:21 AM Status:  Signed     :  Nani Morris (Other Clinical Staff)            IV Contrast- Discharge Instructions After Your CT Scan      The IV contrast you received today will be filtered from your bloodstream by your kidneys during the next 24 hours and pass from the body in urine.  You will not be aware of this process and your urine will not change in color.  To help this process you should drink at least 4 additional glasses of water or juice today.  This reduces stress on your kidneys.    Most contrast reactions are immediate.  Should you develop symptoms of concern after discharge, contact the department at the number below.  After hours you should contact your personal physician.  If you develop breathing distress or wheezing, call 911.

## 2017-12-28 NOTE — PROGRESS NOTES
Patient Information     Patient Name MRN Sex Mervat Morrow [id number] Female 1932      Progress Notes by Nani Morris at 2017  8:21 AM     Author:  Nani Morris Service:  (none) Author Type:  Other Clinical Staff     Filed:  2017  8:21 AM Date of Service:  2017  8:21 AM Status:  Signed     :  Nani Morris (Other Clinical Staff)            Falls Risk Criteria:    Age 65 and older or under age 4        Sensory deficits    Poor vision    Use of ambulatory aides    Impaired judgment    Unable to walk independently    Meets High Risk criteria for falls:  Yes             1.  Do you have dizziness or vertigo?    yes                    2.  Do you need help standing or walking?   yes                 3.  Have you fallen within the last 6 months?    no           4.  Has the patient been fasting?      yes       If any risks are marked Yes, the following interventions are utilized:    Do not leave patient unattended     Assist patient in the dressing room and bathroom    Have ambulatory aides available throughout procedure    Involve patient s family if available

## 2017-12-28 NOTE — TELEPHONE ENCOUNTER
"Patient Information     Patient Name MRN Sex Mervat Morrow [id number] Female 1932      Telephone Encounter by Radha Cheng at 2017 11:32 AM     Author:  Radha Cheng Service:  (none) Author Type:  (none)     Filed:  2017 11:35 AM Encounter Date:  2017 Status:  Signed     :  Radha Cheng            [facility]'t have [facility] at the [facility] as [facility].  The lesions they noticed on her pancreas were common for elderly.  They did recommend she have an MRI of her pancreas for follow up.  (she will need a current creatinine as well)  Radha Cheng LPN....................2017 11:33 AM          "

## 2017-12-28 NOTE — TELEPHONE ENCOUNTER
Patient Information     Patient Name MRN Sex Mervat Morrow [id number] Female 1932      Telephone Encounter by Radha Cheng at 2017 12:35 PM     Author:  Radha Cheng Service:  (none) Author Type:  (none)     Filed:  2017 12:35 PM Encounter Date:  2017 Status:  Signed     :  Radha Cheng            I lora'd up an MRI of abdomen with and without contrast.    If that's okay, please sign order.    Radha Cheng LPN....................2017 12:35 PM

## 2017-12-28 NOTE — PROGRESS NOTES
Patient Information     Patient Name MRN Sex Mervat Morrow [id number] Female 1932      Progress Notes by Nani Morris at 2017  8:21 AM     Author:  Nani Morris Service:  (none) Author Type:  Other Clinical Staff     Filed:  2017  8:21 AM Date of Service:  2017  8:21 AM Status:  Signed     :  Nani Morris (Other Clinical Staff)            1.  Has the patient had a previous reaction to IV contrast? No    2.  Does the patient have kidney disease? No    3.  Is the patient on dialysis? No    If YES to any of these questions, exam will be reviewed with a Radiologist before administering contrast.

## 2017-12-28 NOTE — TELEPHONE ENCOUNTER
Patient Information     Patient Name MRN Sex Mervat Morrow [id number] Female 1932      Telephone Encounter by Kristen Wall RN at 2017 12:31 PM     Author:  Kristen Wall RN Service:  (none) Author Type:  NURS- Registered Nurse     Filed:  2017 12:40 PM Encounter Date:  2017 Status:  Signed     :  Kristen Wall RN (NURS- Registered Nurse)            [facility] requests a Rx refill for metoprolol tartrate (Lopressor) 50 mg tab take 1/2 tab po BID.    Beta Blockers     Office visit in the past 12 months or per provider note.    Last visit with [facility] was on: 2017 in [facility]  Next visit with [facility]LEX LEAL is on: No future appointment listed with this provider  Next visit with Family Practice is on: No future appointment listed in this department    Max refill for 12 months from last office visit or per provider note.    Prescription refilled per RN Medication Refill TRACEY ochoa.................... Kristen Wall RN ....................  2017   12:40 PM

## 2017-12-28 NOTE — PROGRESS NOTES
Patient Information     Patient Name MRN Mervat Leon [id number] Female 1932      Progress Notes by Nancy Davis at 2017 11:18 AM     Author:  Nancy Davis Service:  (none) Author Type:  Other Clinical Staff     Filed:  2017 11:18 AM Date of Service:  2017 11:18 AM Status:  Signed     :  Nancy Davis (Other Clinical Staff)            Falls Risk Criteria:    Age 65 and older or under age 4        Sensory deficits    Poor vision    Use of ambulatory aides    Impaired judgment    Unable to walk independently    Meets High Risk criteria for falls:  Yes               1.  Do you have dizziness or vertigo?    yes                    2.  Do you need help standing or walking?   no                 3.  Have you fallen within the last 6 months?    no           4.  Has the patient been fasting?      no       If any risks are marked Yes, the following interventions are utilized:    Do not leave patient unattended     Assist patient in the dressing room and bathroom    Have ambulatory aides available throughout procedure    Involve patient s family if available

## 2017-12-28 NOTE — TELEPHONE ENCOUNTER
Patient Information     Patient Name MRN Mervat Leon [id number] Female 1932      Telephone Encounter by Adelaida Garcia at 11/10/2017  1:48 PM     Author:  Adelaida Garcia Service:  (none) Author Type:  (none)     Filed:  11/10/2017  1:48 PM Encounter Date:  2017 Status:  Signed     :  Adelaida Garcia            Called Pt  Adelaida Garcia ....................  11/10/2017   1:48 PM

## 2017-12-28 NOTE — PROGRESS NOTES
Patient Information     Patient Name MRN Sex Mervat Morrow [id number] Female 1932      Progress Notes by Daniel Beyer MD at 2017  3:00 PM     Author:  Daniel Beyer MD Service:  (none) Author Type:  Physician     Filed:  2017  3:38 PM Encounter Date:  2017 Status:  Signed     :  Daniel Beyer MD (Physician)            Nursing Notes:   Radha Cheng  2017  3:17 PM  Signed  Patient presents to the clinic to talk about Effexor, she has been on 75 mg which she had every side effect noted.  Radha Cheng LPN....................2017 3:04 PM    Mervat Lockett is a 85 y.o. female who presents for   Chief Complaint     Patient presents with       Medication Management      Side effects from Effexor     HPI: Ms. Lockett comes in stating she is supposed to be on 1/2 of 37.5 mg effexor twice daily but last refill was for 75 mg- unsure how this happened and now she got refill of just capsules so she could not split and took whole 75 and felt anxious and poorly- we figured this out today in clinic so will go back to 37.5- 1/2 twice daily. She has felt poorly since  Past Medical History:     Diagnosis  Date     Closed fracture of right radius and ulna 2017     Hot flashes     Occasional hot flashes      Hx of being hospitalized     Hospitalization for tachycardia and PVCs      Hx of pregnancy     G5, P4-0-1-4      Menopause     with mild symptoms      Osteoarthritis of knee 2014     Plantar wart      S/P ORIF right distal radius 2017     Past Surgical History:      Procedure  Laterality Date     BLEPHAROPLASTY  06    by Dr. White, left brow lift.        CATARACT REMOVAL      Bilateral cataract extraction - Dr. White       CERVICAL POLYPECTOMY      Endocervical polyps       CYSTOCELE REPAIR      Uterine prolapse and vaginal vault prolapse with cystocele       HIP ARTHROPLASTY Right      HIP REPLACEMENT  3/9/09    Left, by Ashish Ivory M.D.    "    HIP SURGERY Left 2012    repair left hip / [place]        S/P ORIF OF RIGHT DISTAL RADIUS Right 04/21/2017    Synthes stainless steel variable angle volar radial plate with T8 screw heads.       SALPINGO-OOPHORECTOMY  4/16/98    Bilateral salpingo oophorectomy and vaginal vault suspension       TOTAL ABDOMINAL HYSTERECTOMY      Rectocele       Family History       Problem   Relation Age of Onset     Heart Disease  Father      CHF       Thyroid Disease  Daughter      Hypothyroid.       Blood Disease  Daughter      severe anemia with a hgb of less than 5       Good Health  Son      Good Health  Daughter      Thyroid Disease  Daughter      Hypothyroid.       Arthritis  Other      Father's side has rheumatoid arthritis       Current Outpatient Prescriptions       Medication  Sig Dispense Refill     esomeprazole (NEXIUM) 40 mg capsule Take 1 capsule by mouth once daily before a meal. 90 capsule 3     HYDROcodone-acetaminophen, 5-325 mg, (NORCO) per tablet Take 1 tablet by mouth every 6 hours if needed  for Pain Max acetaminophen dose: 4000 mg in 24 hrs. 15 tablet 0     metoprolol tartrate (LOPRESSOR) 50 mg tablet TAKE ONE-HALF TABLET BY MOUTH TWICE DAILY 45 tablet 5     mirtazapine (REMERON) 15 mg tablet Take 0.5-1 tablets by mouth at bedtime. 30 tablet 6     venlafaxine (EFFEXOR) 37.5 mg tablet 1/2 BID 90 tablet 3     No current facility-administered medications for this visit.      Medications have been reviewed by me and are current to the best of my knowledge and ability.    Allergies     Allergen  Reactions     Paxil [Paroxetine] Anxiety         EXAM:   Vitals:     08/21/17 1505   BP: 118/70   Weight: 72.4 kg (159 lb 9.6 oz)   Height: 1.6 m (5' 2.99\")     General Appearance: Pleasant, alert, appropriate appearance for age. No acute distress  Psychiatric Exam: Alert and oriented, appropriate affect.  ASSESSMENT AND PLAN:  1. DEPRESSION/ANXIETY  Change back to correct dose  - venlafaxine (EFFEXOR) 37.5 mg tablet; " 1/2 BID  Dispense: 90 tablet; Refill: 3

## 2017-12-28 NOTE — PROGRESS NOTES
"Patient Information     Patient Name MRN Sex     Mervat Lockett [id number] Female 1932      Progress Notes by Daniel Beyer MD at 2017  3:15 PM     Author:  Daniel Beyer MD Service:  (none) Author Type:  Physician     Filed:  2017  4:03 PM Encounter Date:  2017 Status:  Signed     :  Daniel Beyer MD (Physician)            Nursing Notes:   Radha Cheng  2017  3:23 PM  Signed  Patient presents to the clinic to talk about an appointment she has [facility] up at [facility].  Radha Cheng LPN....................2017 3:14 PM    Mervat Lockett is a 85 y.o. female who presents for   Chief Complaint     Patient presents with       Counseling      Talk about upcoming appt at [facility]     HPI: Ms. Lockett comes in to further discuss the mass in the tail of her pancreas. She and her  felt that the CT would just be sent down and looked at by a specialist and I informed them that we would send the referral and the consultant would let us know if they wanted to just look at CT or needed to see the patient also. We were able to work thru the ?s today. We will proceed with referral to the [facility].  Past Medical History:     Diagnosis  Date     Closed fracture of right radius and ulna 2017     Hot flashes     Occasional hot flashes      Hx of being hospitalized     Hospitalization for tachycardia and PVCs      Hx of pregnancy     G5, P4-0-1-4      Menopause     with mild symptoms      Osteoarthritis of knee 2014     Plantar wart      S/P ORIF right distal radius 2017     Past Surgical History:      Procedure  Laterality Date     BLEPHAROPLASTY  06    by Dr. White, left brow lift.        CATARACT REMOVAL  2010    Bilateral cataract extraction - Dr. White       CERVICAL POLYPECTOMY      Endocervical polyps       CYSTOCELE REPAIR      Uterine prolapse and vaginal vault prolapse with cystocele       HIP ARTHROPLASTY Right      HIP REPLACEMENT  3/9/09 " "   Left, by Ashish Ivory M.D.       HIP SURGERY Left 2012    repair left hip / [place]        S/P ORIF OF RIGHT DISTAL RADIUS Right 04/21/2017    Synthes stainless steel variable angle volar radial plate with T8 screw heads.       SALPINGO-OOPHORECTOMY  4/16/98    Bilateral salpingo oophorectomy and vaginal vault suspension       TOTAL ABDOMINAL HYSTERECTOMY      Rectocele       Family History       Problem   Relation Age of Onset     Heart Disease  Father      CHF       Thyroid Disease  Daughter      Hypothyroid.       Blood Disease  Daughter      severe anemia with a hgb of less than 5       Good Health  Son      Good Health  Daughter      Thyroid Disease  Daughter      Hypothyroid.       Arthritis  Other      Father's side has rheumatoid arthritis       Current Outpatient Prescriptions       Medication  Sig Dispense Refill     esomeprazole (NEXIUM) 40 mg capsule Take 1 capsule by mouth once daily before a meal. 90 capsule 3     HYDROcodone-acetaminophen, 5-325 mg, (NORCO) per tablet Take 1 tablet by mouth every 6 hours if needed  for Pain Max acetaminophen dose: 4000 mg in 24 hrs. 15 tablet 0     metoprolol tartrate (LOPRESSOR) 50 mg tablet TAKE ONE-HALF TABLET BY MOUTH TWICE DAILY 45 tablet 5     mirtazapine (REMERON) 15 mg tablet Take 0.5-1 tablets by mouth at bedtime. 30 tablet 6     venlafaxine (EFFEXOR) 37.5 mg tablet 1/2 BID 90 tablet 3     No current facility-administered medications for this visit.      Medications have been reviewed by me and are current to the best of my knowledge and ability.    Allergies     Allergen  Reactions     Paxil [Paroxetine] Anxiety         EXAM:   Vitals:     08/28/17 1514   BP: 134/86   Weight: 71.7 kg (158 lb)   Height: 1.6 m (5' 2.99\")     General Appearance: Pleasant, alert, appropriate appearance for age. No acute distress  ASSESSMENT AND PLAN:  1. Pancreatic mass  [facility]                 "

## 2017-12-28 NOTE — TELEPHONE ENCOUNTER
"Patient Information     Patient Name MRN Mervat Leon [id number] Female 1932      Telephone Encounter by Radha Cheng at 2017  2:00 PM     Author:  Radha Cheng Service:  (none) Author Type:  (none)     Filed:  2017  2:01 PM Encounter Date:  2017 Status:  Signed     :  Radha Cheng            Patient got a call from the [facility] to schedule an appointment.  She didn't think she needed to be seen, thought that Dr. Beyer was just going to send down her x-rays and they'd get back to her.  She was informed that Dr. Beyer does want her to be evaluated by a doctor there, she will call them back to schedule the appointment.    Radha Cheng LPN....................2017 2:01 PM          "

## 2017-12-29 NOTE — H&P
Patient Information     Patient Name MRN Mervat Leon [id number] Female 1932      H&P by Daniel Beyer MD at 10/17/2017 11:00 AM     Author:  Daniel Beyer MD Service:  (none) Author Type:  Physician     Filed:  10/17/2017 11:58 AM Encounter Date:  10/17/2017 Status:  Signed     :  Daniel Beyer MD (Physician)            ----------------- PREOPERATIVE EXAM ------------------  10/17/2017    SUBJECTIVE:  Mervat Lockett is a 85 y.o. female here for preop.    I was asked to see Mervat Lockett by Dr. Hodge for a preoperative history and physical.      Date of Surgery: 11/3/17  Type of Surgery: Endoscopy to evaluate pancreatic mass noted on imaging  [facility]:  [facility] [address]    HPI:  During evaluation CT revealed a pancreatic abnormality and Mervat will be seeing MD on 17 with plans for further evaluation including endoscopy the next day.   Patient has no personal nor FH of life threatening anesthesia complications. Patient denies unusual bleeding tendencies. No recent history of cough, fever,cold, sweats, chills.       Patient Active Problem List       Diagnosis  Date Noted     Abnormal CT of the abdomen  2017     Osteoarthritis of knee  2014     DEPRESSION/ANXIETY       B12 DEFICIENCY  2012     IRON DEFICIENCY  2012     FATIGUE  2011     HIP PAIN, LEFT  2010     HYPERTENSION  2010     HYPERCHOLESTEROLEMIA       ANXIETY, CHRONIC       Chronic anxiety          SPINAL STENOSIS, LUMBAR       Degenerative lumbar disc disease with lumbar spinal stenosis          OSTEOPENIA         Past Medical History:     Diagnosis  Date     Closed fracture of right radius and ulna 2017     Hot flashes     Occasional hot flashes      Hx of being hospitalized     Hospitalization for tachycardia and PVCs      Hx of pregnancy     G5, P4-0-1-4      Menopause     with mild symptoms      Osteoarthritis of  knee 11/6/2014     Plantar wart      S/P ORIF right distal radius 4/21/2017       Past Surgical History:      Procedure  Laterality Date     BLEPHAROPLASTY  4/18/06    by Dr. White, left brow lift.        CATARACT REMOVAL  2010    Bilateral cataract extraction - Dr. White       CERVICAL POLYPECTOMY      Endocervical polyps       CYSTOCELE REPAIR      Uterine prolapse and vaginal vault prolapse with cystocele       HIP ARTHROPLASTY Right      HIP REPLACEMENT  3/9/09    Left, by Ashish Ivory M.D.       HIP SURGERY Left 2012    repair left hip / [place]        S/P ORIF OF RIGHT DISTAL RADIUS Right 04/21/2017    Synthes stainless steel variable angle volar radial plate with T8 screw heads.       SALPINGO-OOPHORECTOMY  4/16/98    Bilateral salpingo oophorectomy and vaginal vault suspension       TOTAL ABDOMINAL HYSTERECTOMY      Rectocele         Current Outpatient Prescriptions       Medication  Sig Dispense Refill     esomeprazole (NEXIUM) 40 mg capsule Take 1 capsule by mouth once daily before a meal. 90 capsule 3     metoprolol tartrate (LOPRESSOR) 50 mg tablet TAKE ONE-HALF TABLET BY MOUTH TWICE DAILY 45 tablet 5     mirtazapine (REMERON) 15 mg tablet Take 0.5-1 tablets by mouth at bedtime. 30 tablet 6     venlafaxine (EFFEXOR) 37.5 mg tablet 1/2 BID 90 tablet 3     No current facility-administered medications for this visit.      Medications have been reviewed by me and are current to the best of my knowledge and ability.      Allergies:  Allergies     Allergen  Reactions     Paxil [Paroxetine] Anxiety       Latex allergy  no    Td up to date:  2012    Family History       Problem   Relation Age of Onset     Heart Disease  Father      CHF       Thyroid Disease  Daughter      Hypothyroid.       Blood Disease  Daughter      severe anemia with a hgb of less than 5       Good Health  Son      Good Health  Daughter      Thyroid Disease  Daughter      Hypothyroid.       Arthritis  Other      Father's side has  "rheumatoid arthritis            Social History       Substance Use Topics         Smoking status:   Former Smoker     Smokeless tobacco:   Never Used     Alcohol use   1.2 oz/week     1 Glasses of wine, 1 Standard drinks or equivalent per week        Comment: 1 a night          ROS:    surgical:  patient denies previous complications from prior surgeries including but not limited to prolonged bleeding, anesthesia complications, dysrhythmias, surgical wound infections, or prolonged hospital stay.    Hx of blood transfusions:  NO        -------------------------------------------------------------    PHYSICAL EXAM:  /84  Pulse (!) 108  Temp 97.3  F (36.3  C) (Temporal)  Ht 1.6 m (5' 2.99\")  Wt 73 kg (161 lb)  Breastfeeding? No  BMI 28.53 kg/m2    PHYSICAL EXAMINATION  EXAM:   General Appearance: Pleasant, alert, appropriate appearance for age. No acute distress  Ear Exam: Normal TM's bilaterally. Normal auditory canals and external ears. Non-tender.  OroPharynx Exam: Dental hygiene adequate. Normal buccal mucosa. Normal pharynx.  Neck Exam: Supple, no masses or nodes.  Chest/Respiratory Exam: Normal chest wall and respirations. Clear to auscultation.  Cardiovascular Exam: Regular rate and rhythm. S1, S2, no murmur, click, gallop, or rubs.  Gastrointestinal Exam: Soft, nontender, no abnormal masses or organomegaly.  Lymphatic Exam: Non-palpable nodes in neck, clavicular, axillary, or inguinal regions.  Skin: no rash or abnormalities  Neurologic Exam: Nonfocal;  normal gross motor movement, tone, and coordination. No tremor.  Psychiatric Exam: Alert and oriented, appropriate affect.      ASSESSMENT/PLAN:    ICD-10-CM    1. Pancreatic mass K86.9    2. DEPRESSION/ANXIETY F34.1 mirtazapine (REMERON) 15 mg tablet         LABS:  Normal BMP and CBC 6/27/17- Hemoglobin 11.8        EKG:  appears normal, NSR- 4/17/2017 with no cardiac symptoms then nor " since  ---------------------------------------------------------------    ASSESSEMNT AND PLAN:  1.  Preoperative history and physical   consults:  none    For above listed surgery and anesthesia:     - Patient is low  risk for perioperative complications.      PRE OP RECOMMENDATIONS:  Discontinue ASA 5 days prior to reduce bleeding risk and Discontinue NSAIDS 5 days prior to procedure to reduce bleeding risk        Daniel Beyer MD, MD..................10/17/2017 11:28 AM

## 2017-12-30 NOTE — NURSING NOTE
Patient Information     Patient Name MRN Sex Mervat Morrow [id number] Female 1932      Nursing Note by Gosselin, Norma J at 2017 10:30 AM     Author:  Gosselin, Norma J Service:  (none) Author Type:  (none)     Filed:  2017 10:27 AM Encounter Date:  2017 Status:  Signed     :  Gosselin, Norma J            Follow up right wrist ORIF dos: 17  Norma J Gosselin LPN....................  2017   10:26 AM

## 2017-12-30 NOTE — NURSING NOTE
"Patient Information     Patient Name MRN Mervat Leon [id number] Female 1932      Nursing Note by Radha Cheng at 2017  3:15 PM     Author:  Radha Cheng Service:  (none) Author Type:  (none)     Filed:  2017  3:23 PM Encounter Date:  2017 Status:  Signed     :  Radha Cheng            Patient presents to the clinic to talk about an appointment she has coming up [facility] the [facility] LPN....................2017 3:14 PM          "

## 2017-12-30 NOTE — NURSING NOTE
Patient Information     Patient Name MRN Mervat eLon [id number] Female 1932      Nursing Note by Radha Cheng at 2017  3:00 PM     Author:  Radha Cheng Service:  (none) Author Type:  (none)     Filed:  2017  3:17 PM Encounter Date:  2017 Status:  Signed     :  Radha Cheng            Patient presents to the clinic to talk about Effexor, she has been on 75 mg which she had every side effect noted.  Radha Cheng LPN....................2017 3:04 PM

## 2017-12-30 NOTE — NURSING NOTE
"Patient Information     Patient Name MRN Mervat Leon [id number] Female 1932      Nursing Note by Radha Cheng at 10/17/2017 11:00 AM     Author:  Radha Cheng Service:  (none) Author Type:  (none)     Filed:  10/17/2017 11:24 AM Encounter Date:  10/17/2017 Status:  Signed     :  Radha Cheng            This patient presents today for a Preoperative exam for this procedure: Removal of pancreatic mass   Date of Surgery: 11/3/17   Surgeon:  Dr. Guru Laboy  Facility:  [facility]  Fax:        Fever/Chills or other infectious symptoms in past month: no  >10lb weight loss in past two months: no  O2 SAT: 97    Health Care Directive/Code status:  yes  Hx of blood transfusions:   (NO)   Td up to date:  yes  History of VRE/MRSA:  (NO) Date: no    Preoperative Evaluation: Obstructive Sleep Apnea screening    S: Snore -  Do you snore loudly? (louder than talking or loud enough to be heard through closed doors)no  T: Tired - Do you often feel tired, fatigued, or sleepy during the daytime?(YES)  O: Observed - Has anyone ever observed you stop breathing during your sleep?(NO)  P: Pressure - Do you have or are you being treated for high blood pressure?(YES)  B: BMI - BMI greater than 35kg/m2?(YES)  A: Age - Age over 50 years old?(YES)  N: Neck - Neck circumference greater than 40 cm?(NO)  G: Gender - Gender: Male?(NO)    Total number of \"YES\" responses:  4    Scoring: Low risk of MEGHAN 0-2  At Risk of MEGHAN: >3 High Risk of MEGHAN: 5-8                  "

## 2017-12-30 NOTE — NURSING NOTE
Patient Information     Patient Name MRN Mervat Leon [id number] Female 1932      Nursing Note by Radha Cheng at 2017 12:00 PM     Author:  Radha Cheng Service:  (none) Author Type:  (none)     Filed:  2017 12:08 PM Encounter Date:  2017 Status:  Signed     :  Radha Cheng            Patient presents to the clinic with fatigue.  She's not sure if it could be from depression  Radha Cheng LPN....................2017 12:00 PM

## 2018-01-02 NOTE — TELEPHONE ENCOUNTER
Patient Information     Patient Name MRN Mervat Leon [id number] Female 1932      Telephone Encounter by Shazia Martínez RN at 2017  8:44 AM     Author:  Shazia Martínez RN Service:  (none) Author Type:  NURS- Registered Nurse     Filed:  2017  8:50 AM Encounter Date:  2017 Status:  Signed     :  Shazia Martínez RN (NURS- Registered Nurse)            Beta Blockers     Office visit in the past 12 months or per provider note.    Last visit with [facility] was on: 10/16/2015 in [facility]  Next visit with FLEX LEAL is on: No future appointment listed with this provider  Next visit with Family Practice is on: No future appointment listed in this department    Max refill for 12 months from last office visit or per provider note.    Patient is due for medication management appointment. Limited refill provided at this time and patient contacted via phone to schedule appointment. Prescription refilled per RN Medication Refill Policy.................... Shazia Martínez RN ....................  2017   8:48 AM

## 2018-01-03 NOTE — TELEPHONE ENCOUNTER
Patient Information     Patient Name MRN Mervat Leon [id number] Female 1932      Telephone Encounter by Shazia Martínez RN at 3/10/2017  8:00 AM     Author:  Shazia Martínez RN Service:  (none) Author Type:  NURS- Registered Nurse     Filed:  3/10/2017  8:16 AM Encounter Date:  3/10/2017 Status:  Signed     :  Shazia Martínez RN (NURS- Registered Nurse)            Redundant Refill Request refused;  Medication:venlafaxine (EFFEXOR XR) 37.5 mg Extended-Release capsule    Qty:180 capsu*  Ref:4  Start:3/6/2017  Unable to complete prescription refill per RN Medication Refill Policy.................... Shazia Martínez RN ....................  3/10/2017   8:01 AM

## 2018-01-03 NOTE — NURSING NOTE
Patient Information     Patient Name MRN Mervat Leon [id number] Female 1932      Nursing Note by Radha Cheng at 2017 11:00 AM     Author:  Radha Cheng Service:  (none) Author Type:  (none)     Filed:  2017 11:13 AM Encounter Date:  2017 Status:  Signed     :  Radha Cheng            Patient presents to the clinic for a follow up on depression medications, she stopped taking the Venlafaxine 2 days ago.  Feels better without it.    Radha Cheng LPN....................2017 11:02 AM

## 2018-01-03 NOTE — NURSING NOTE
Patient Information     Patient Name MRN Mervat Leon [id number] Female 1932      Nursing Note by Radha Cheng at 3/13/2017 10:15 AM     Author:  Radha Cheng Service:  (none) Author Type:  (none)     Filed:  3/13/2017 10:21 AM Encounter Date:  3/13/2017 Status:  Signed     :  Radha Cheng            Patient presents to the clinic for a follow up on dizziness, she's feeling much better.  Radha Cheng LPN....................3/13/2017 10:11 AM

## 2018-01-03 NOTE — PROGRESS NOTES
Patient Information     Patient Name MRN Sex     Mervat Lockett [id number] Female 1932      Progress Notes by Daniel Beyer MD at 3/6/2017  3:15 PM     Author:  Daniel Beyer MD Service:  (none) Author Type:  Physician     Filed:  3/6/2017  3:50 PM Encounter Date:  3/6/2017 Status:  Signed     :  Daniel Beyer MD (Physician)            There are no exam notes on file for this visit.  Mervat Lockett is a 85 y.o. female who presents for   No chief complaint on file.    HPI: Ms. Lockett states she has been light headed / seh worries about he memory also ; she has reduced her metoprolol to 25 just in the emili and her pulse today is 100- 110 and regular; her BP is OK. I wrote out all her medications as to how she is to take  Past Medical History      Diagnosis   Date     Hot flashes       Occasional hot flashes      Hx of being hospitalized       Hospitalization for tachycardia and PVCs      Hx of pregnancy       G5, P4-0-1-4      Menopause       with mild symptoms      Osteoarthritis of knee  2014     Plantar wart       Past Surgical History       Procedure   Laterality Date     Cervical polypectomy        Endocervical polyps       Cystocele repair        Uterine prolapse and vaginal vault prolapse with cystocele       Total abdominal hysterectomy        Rectocele       Salpingo-oophorectomy   98     Bilateral salpingo oophorectomy and vaginal vault suspension       Blepharoplasty   06     by Dr. White, left brow lift.        Hip replacement   3/9/09     Left, by Ashish Ivory M.D.       Cataract removal        Bilateral cataract extraction - Dr. White       Family History       Problem   Relation Age of Onset     Thyroid Disease  Daughter      Hypothyroid.       Blood Disease  Daughter      severe anemia with a hgb of less than 5       Thyroid Disease  Daughter      Hypothyroid.       Heart Disease  Father      CHF       Arthritis  Other      Father's side has rheumatoid  "arthritis       Good Health  Son      Good Health  Daughter      Current Outpatient Prescriptions       Medication  Sig Dispense Refill     esomeprazole (NEXIUM) 40 mg capsule Take 1 capsule by mouth once daily before a meal. 90 capsule 3     metoprolol tartrate (LOPRESSOR) 50 mg tablet Take 0.5 tablets by mouth 2 times daily. 45 tablet 3     mirtazapine (REMERON) 15 mg tablet Take 0.5-1 tablets by mouth at bedtime. 30 tablet 6     venlafaxine (EFFEXOR XR) 37.5 mg Extended-Release capsule 1 tablet  capsule 4     No current facility-administered medications for this visit.      Medications have been reviewed by me and are current to the best of my knowledge and ability.    Allergies     Allergen  Reactions     Paxil [Paroxetine] Anxiety         EXAM:   Vitals:     03/06/17 1518   BP: 140/72   Temp: 97.1  F (36.2  C)   TempSrc: Temporal   Weight: 72.8 kg (160 lb 6.4 oz)   Height: 1.6 m (5' 2.99\")     General Appearance: Pleasant, alert, appropriate appearance for age. No acute distress  Chest/Respiratory Exam: Normal chest wall and respirations. Clear to auscultation.  Cardiovascular Exam: Regular rate and rhythm. S1, S2, no murmur, click, gallop, or rubs.  Neurologic Exam: Nonfocal; symmetric DTRs, normal gross motor movement, tone, and coordination. No tremor. MMSE was perfect  ASSESSMENT AND PLAN:  1. HYPERTENSION  Needs metoprolol BID    2. Light headed  Likely related to tachycardia which will make this better  3  Anxiety; venlafaxine + rememron               "

## 2018-01-03 NOTE — PROGRESS NOTES
Patient Information     Patient Name MRN Sex Mervat Morrow [id number] Female 1932      Progress Notes by Daniel Beyer MD at 2017 11:00 AM     Author:  Daniel Beyer MD Service:  (none) Author Type:  Physician     Filed:  2017 11:27 AM Encounter Date:  2017 Status:  Signed     :  Daniel Beyer MD (Physician)            Nursing Notes:   Radha Cheng  2017 11:13 AM  Signed  Patient presents to the clinic for a follow up on depression medications, she stopped taking the Venlafaxine 2 days ago.  Feels better without it.    Radha Cheng LPN....................2017 11:02 AM    Mervat Lockett is a 85 y.o. female who presents for   Chief Complaint     Patient presents with       Depression      Follow up on meds     HPI: Ms. Lockett comes in stating the remeron seemed to help but she decided to stop it this weekend after she forgot it one day; she has not had any withdrawal symptoms . She is having memory trouble especially about her medications. She is just started on the remeron last month and it is not clear whether she is taking venlafaxine- I encourage her to bring in medications. She is not as active and she is bored with her new lifestyle/ I told her it is OK to push herself physically. Sh edoes still play Bridge and does well with it.   Past Medical History      Diagnosis   Date     Hot flashes       Occasional hot flashes      Hx of being hospitalized       Hospitalization for tachycardia and PVCs      Hx of pregnancy       G5, P4-0-1-4      Menopause       with mild symptoms      Osteoarthritis of knee  2014     Plantar wart       Past Surgical History       Procedure   Laterality Date     Cervical polypectomy        Endocervical polyps       Cystocele repair        Uterine prolapse and vaginal vault prolapse with cystocele       Total abdominal hysterectomy        Rectocele       Salpingo-oophorectomy   98     Bilateral salpingo  "oophorectomy and vaginal vault suspension       Blepharoplasty   4/18/06     by Dr. White, left brow lift.        Hip replacement   3/9/09     Left, by Ashish Ivory M.D.       Cataract removal   2010     Bilateral cataract extraction - Dr. White       Family History       Problem   Relation Age of Onset     Thyroid Disease  Daughter      Hypothyroid.       Blood Disease  Daughter      severe anemia with a hgb of less than 5       Thyroid Disease  Daughter      Hypothyroid.       Heart Disease  Father      CHF       Arthritis  Other      Father's side has rheumatoid arthritis       Good Health  Son      Good Health  Daughter      Current Outpatient Prescriptions       Medication  Sig Dispense Refill     esomeprazole (NEXIUM) 40 mg capsule Take 1 capsule by mouth once daily before a meal. 90 capsule 3     metoprolol tartrate (LOPRESSOR) 50 mg tablet Take 0.5 tablets by mouth 2 times daily. 45 tablet 3     mirtazapine (REMERON) 15 mg tablet Take 0.5-1 tablets by mouth at bedtime. 30 tablet 6     venlafaxine (EFFEXOR XR) 37.5 mg Extended-Release capsule 1 tablet  capsule 4     No current facility-administered medications for this visit.      Medications have been reviewed by me and are current to the best of my knowledge and ability.    Allergies     Allergen  Reactions     Paxil [Paroxetine] Anxiety         EXAM:   Vitals:     02/13/17 1102   BP: 142/80   Weight: 72.1 kg (159 lb)   Height: 1.6 m (5' 2.99\")     General Appearance: Pleasant, alert, appropriate appearance for age. No acute distress  Musculoskeletal Exam: mobile in her activities in the clinic  ASSESSMENT AND PLAN:  1. Depression, unspecified depression type  She will use the remeron if she starts to feel she is withdrawing                 "

## 2018-01-03 NOTE — NURSING NOTE
Patient Information     Patient Name MRMervat Fowler [id number] Female 1932      Nursing Note by Radha Cheng at 2017  1:15 PM     Author:  Radha Cheng Service:  (none) Author Type:  (none)     Filed:  2017  1:22 PM Encounter Date:  2017 Status:  Signed     :  Radha Cheng            Patient presents to the clinic for medication refill.  She does feel more depressed than normal, sleeps a lot.    PHQ Depression Screen  Date of PHQ exam: 17  Over the last 2 weeks, how often have you been bothered by any of the following problems?  1. Little interest or pleasure in doing things: 3 - Nearly every day  2. Feeling down, depressed, or hopeless: 1 - Several days  3. Trouble falling or staying asleep, or sleeping too much: 3 - Nearly every day  4. Feeling tired or having little energy: 3 - Nearly every day  5. Poor appetite or overeatin - More than half the days  6. Feeling bad about yourself - or that you are a failure or have let yourself or your family down: 0 - Not at all  7. Trouble concentrating on things, such as reading the newspaper or watching television: 1 - Several days  8. Moving or speaking so slowly that other people could have noticed. Or the opposite - being so fidgety or restless that you have been moving around a lot more than usual: 0 - Not at all  9. Thoughts that you would be better off dead, or of hurting yourself in some way: 0 - Not at all    PHQ-9 TOTAL SCORE: 13  Depression Severity Level: moderate       Radha Cheng LPN....................2017 1:08 PM

## 2018-01-03 NOTE — PROGRESS NOTES
Patient Information     Patient Name MRN Sex Mervat Morrow [id number] Female 1932      Progress Notes by Daniel Beyer MD at 2017  1:15 PM     Author:  Daniel Beyer MD Service:  (none) Author Type:  Physician     Filed:  2017  1:49 PM Encounter Date:  2017 Status:  Signed     :  Daniel Beyer MD (Physician)            Nursing Notes:   Radha Cheng  2017  1:22 PM  Signed  Patient presents to the clinic for medication refill.  She does feel more depressed than normal, sleeps a lot.    PHQ Depression Screen  Date of PHQ exam: 17  Over the last 2 weeks, how often have you been bothered by any of the following problems?  1. Little interest or pleasure in doing things: 3 - Nearly every day  2. Feeling down, depressed, or hopeless: 1 - Several days  3. Trouble falling or staying asleep, or sleeping too much: 3 - Nearly every day  4. Feeling tired or having little energy: 3 - Nearly every day  5. Poor appetite or overeatin - More than half the days  6. Feeling bad about yourself - or that you are a failure or have let yourself or your family down: 0 - Not at all  7. Trouble concentrating on things, such as reading the newspaper or watching television: 1 - Several days  8. Moving or speaking so slowly that other people could have noticed. Or the opposite - being so fidgety or restless that you have been moving around a lot more than usual: 0 - Not at all  9. Thoughts that you would be better off dead, or of hurting yourself in some way: 0 - Not at all    PHQ-9 TOTAL SCORE: 13  Depression Severity Level: moderate       Radha Cheng LPN....................2017 1:08 PM    Mervat Lockett is a 85 y.o. female who presents for   Chief Complaint     Patient presents with       Medication Management      Refills     Depression      HPI: Ms. Lockett comes in stating she has not had her usual motivation and she does not sleep well; appetite is poor; she really  "has no interest in doing things; denies suicidal thoughts. She has some fun times but not consistently. She has never been on an anti depressant that she remembers. It is however listed on her problem list and she is on venlafaxine to help \"even her out\"   Shehas 2 spots on left temple that are likely AK vs warts and I suggest freezing; also has a tag on her neck that looks warty- I suggest freezing  Past Medical History      Diagnosis   Date     Hot flashes       Occasional hot flashes      Hx of being hospitalized       Hospitalization for tachycardia and PVCs      Hx of pregnancy       G5, P4-0-1-4      Menopause       with mild symptoms      Osteoarthritis of knee  11/6/2014     Plantar wart       Past Surgical History       Procedure   Laterality Date     Cervical polypectomy        Endocervical polyps       Cystocele repair        Uterine prolapse and vaginal vault prolapse with cystocele       Total abdominal hysterectomy        Rectocele       Salpingo-oophorectomy   4/16/98     Bilateral salpingo oophorectomy and vaginal vault suspension       Blepharoplasty   4/18/06     by Dr. White, left brow lift.        Hip replacement   3/9/09     Left, by Ashish Ivory M.D.       Cataract removal   2010     Bilateral cataract extraction - Dr. White       Family History       Problem   Relation Age of Onset     Thyroid Disease  Daughter      Hypothyroid.       Blood Disease  Daughter      severe anemia with a hgb of less than 5       Thyroid Disease  Daughter      Hypothyroid.       Heart Disease  Father      CHF       Arthritis  Other      Father's side has rheumatoid arthritis       Good Health  Son      Good Health  Daughter      Current Outpatient Prescriptions       Medication  Sig Dispense Refill     esomeprazole (NEXIUM) 40 mg capsule Take 1 capsule by mouth once daily before a meal. 90 capsule 3     metoprolol tartrate (LOPRESSOR) 50 mg tablet TAKE 1/2 TABLET BY MOUTH TWICE DAILY 30 tablet 0     venlafaxine " "(EFFEXOR XR) 37.5 mg Extended-Release capsule 1 tablet  capsule 4     No current facility-administered medications for this visit.      Medications have been reviewed by me and are current to the best of my knowledge and ability.    Allergies     Allergen  Reactions     Paxil [Paroxetine] Anxiety         EXAM:   Vitals:     01/16/17 1308   BP: 122/74   Temp: 97.4  F (36.3  C)   TempSrc: Temporal   Weight: 72.5 kg (159 lb 12.8 oz)   Height: 1.6 m (5' 2.99\")     General Appearance: Pleasant, alert, appropriate appearance for age. No acute distress  Psychiatric Exam: Alert and oriented, appropriate affect.  ASSESSMENT AND PLAN:  1. DEPRESSION/ANXIETY  - venlafaxine (EFFEXOR XR) 37.5 mg Extended-Release capsule; 1 tablet twice daily; the med has been quite helpful for her, but it may not be working as well  Dispense: 180 capsule; Refill: 4    2. HYPERTENSION  controlled  - metoprolol tartrate (LOPRESSOR) 50 mg tablet; Take 0.5 tablets by mouth 2 times daily.  Dispense: 45 tablet; Refill: 3    3. Abdominal pain, epigastric  resolved  - esomeprazole (NEXIUM) 40 mg capsule; Take 1 capsule by mouth once daily before a meal.  Dispense: 90 capsule; Refill: 3  4 ak vs skin tags- will freeze each are 2-3 mm               "

## 2018-01-03 NOTE — PROGRESS NOTES
Patient Information     Patient Name MRN Sex Mervat Morrow [id number] Female 1932      Progress Notes by Daniel Beyer MD at 3/13/2017 10:15 AM     Author:  Daniel Beyer MD Service:  (none) Author Type:  Physician     Filed:  3/13/2017 10:35 AM Encounter Date:  3/13/2017 Status:  Signed     :  Daniel Beyer MD (Physician)            Nursing Notes:   Radha Cheng  3/13/2017 10:21 AM  Signed  Patient presents to the clinic for a follow up on dizziness, she's feeling much better.  Radha Cheng LPN....................3/13/2017 10:11 AM    Mervat Lockett is a 85 y.o. female who presents for   Chief Complaint     Patient presents with       Dizziness      Follow up     HPI: Ms. Lockett comes in feeling much better after taking metoprolol twice daily rather than once daily; she has had a good week  Past Medical History      Diagnosis   Date     Hot flashes       Occasional hot flashes      Hx of being hospitalized       Hospitalization for tachycardia and PVCs      Hx of pregnancy       G5, P4-0-1-4      Menopause       with mild symptoms      Osteoarthritis of knee  2014     Plantar wart       Past Surgical History       Procedure   Laterality Date     Cervical polypectomy        Endocervical polyps       Cystocele repair        Uterine prolapse and vaginal vault prolapse with cystocele       Total abdominal hysterectomy        Rectocele       Salpingo-oophorectomy   98     Bilateral salpingo oophorectomy and vaginal vault suspension       Blepharoplasty   06     by Dr. White, left brow lift.        Hip replacement   3/9/09     Left, by Ashish Ivory M.D.       Cataract removal   2010     Bilateral cataract extraction - Dr. White       Family History       Problem   Relation Age of Onset     Thyroid Disease  Daughter      Hypothyroid.       Blood Disease  Daughter      severe anemia with a hgb of less than 5       Thyroid Disease  Daughter      Hypothyroid.        Heart Disease  Father      CHF       Arthritis  Other      Father's side has rheumatoid arthritis       Good Health  Son      Good Health  Daughter      Current Outpatient Prescriptions       Medication  Sig Dispense Refill     esomeprazole (NEXIUM) 40 mg capsule Take 1 capsule by mouth once daily before a meal. 90 capsule 3     metoprolol tartrate (LOPRESSOR) 50 mg tablet Take 0.5 tablets by mouth 2 times daily. 45 tablet 3     mirtazapine (REMERON) 15 mg tablet Take 0.5-1 tablets by mouth at bedtime. 30 tablet 6     venlafaxine (EFFEXOR XR) 37.5 mg Extended-Release capsule 1 tablet  capsule 4     No current facility-administered medications for this visit.      Medications have been reviewed by me and are current to the best of my knowledge and ability.    Allergies     Allergen  Reactions     Paxil [Paroxetine] Anxiety         EXAM:   Vitals:     03/13/17 1011   BP: 114/66     General Appearance: Pleasant, alert, appropriate appearance for age. No acute distress  Chest/Respiratory Exam: Normal chest wall and respirations. Clear to auscultation.  Cardiovascular Exam: Regular rate and rhythm. S1, S2, no murmur, click, gallop, or rubs.  ASSESSMENT AND PLAN:  1. HYPERTENSION  Improved control, pulse, and sense of well being with twice daily metoprolol  2 spinal stenosis- will give trial physical therapy to see if they can help with mobility

## 2018-01-04 NOTE — H&P
Patient Information     Patient Name MRN Mervat Leon [id number] Female 1932      H&P (View-Only) by Daniel Beyer MD at 2017  8:15 AM     Author:  Daniel Beyer MD Service:  (none) Author Type:  Physician     Filed:  2017  8:57 AM Date of Service:  2017  8:15 AM Status:  Signed     :  Daniel Beyer MD (Physician)            ----------------- PREOPERATIVE EXAM ------------------  2017    SUBJECTIVE:  Mervat Lockett is a 85 y.o. female here for preop.    I was asked to see Mervat Lockett by Dr. Martínez for a preoperative history and physical.      Date of Surgery: 7  Type of Surgery: ORIF  Surgeon: [facility]:  [facility]    HPI:  Ms Lockett tripped and fell 17 sustaining a fracture to R wrist. There is a fair amount of angulation. Surgical repair is planned. She has a splint on / surgery is tomorrow.   Patient has no personal nor FH of life threatening anesthesia complications. Patient denies unusual bleeding tendencies. No recent history of cough, fever,cold, sweats, chills.       Patient Active Problem List       Diagnosis  Date Noted     Closed fracture of right radius and ulna  2017     DJD (degenerative joint disease)  2015     Osteoarthritis of knee  2014     ACP (advance care planning)  2013     Lipoma  2013     Seborrheic keratosis  2013     DEPRESSION/ANXIETY       NECK PAIN  2012     ARM PAIN, LEFT  2012     B12 DEFICIENCY  2012     IRON DEFICIENCY  2012     FATIGUE  2011     SACROILIAC JOINT DYSFUNCTION  2011     HIP PAIN, LEFT  2010     HYPERTENSION  2010     HYPERCHOLESTEROLEMIA       ANXIETY, CHRONIC       Chronic anxiety          SENILE KERATOSIS       Senile keratosis left abdomen          GASTROINTESTINAL DISORDER, PSYCHOGENIC       History of psychological gastrointestinal distress          ABDOMINAL PAIN, EPIGASTRIC       Peptic acid disease           SPINAL STENOSIS, LUMBAR       Degenerative lumbar disc disease with lumbar spinal stenosis          OSTEOPENIA         Past Medical History:     Diagnosis  Date     Closed fracture of right radius and ulna 04/13/2017     Hot flashes     Occasional hot flashes      Hx of being hospitalized     Hospitalization for tachycardia and PVCs      Hx of pregnancy     G5, P4-0-1-4      Menopause     with mild symptoms      Osteoarthritis of knee 11/6/2014     Plantar wart        Past Surgical History:      Procedure  Laterality Date     BLEPHAROPLASTY  4/18/06    by Dr. White, left brow lift.        CATARACT REMOVAL  2010    Bilateral cataract extraction - Dr. White       CERVICAL POLYPECTOMY      Endocervical polyps       CYSTOCELE REPAIR      Uterine prolapse and vaginal vault prolapse with cystocele       HIP ARTHROPLASTY Right      HIP REPLACEMENT  3/9/09    Left, by Ashish Ivory M.D.       HIP SURGERY Left 2012    repair left hip / [place]        SALPINGO-OOPHORECTOMY  4/16/98    Bilateral salpingo oophorectomy and vaginal vault suspension       TOTAL ABDOMINAL HYSTERECTOMY      Rectocele         Current Outpatient Prescriptions       Medication  Sig Dispense Refill     esomeprazole (NEXIUM) 40 mg capsule Take 1 capsule by mouth once daily before a meal. 90 capsule 3     HYDROcodone-acetaminophen, 5-325 mg, (NORCO) per tablet Take 1-2 tablets by mouth every 4 hours if needed  for Pain Max acetaminophen dose: 4000 mg in 24 hrs. 20 tablet 0     metoprolol tartrate (LOPRESSOR) 50 mg tablet Take 0.5 tablets by mouth 2 times daily. 45 tablet 3     mirtazapine (REMERON) 15 mg tablet Take 0.5-1 tablets by mouth at bedtime. 30 tablet 6     venlafaxine (EFFEXOR XR) 37.5 mg Extended-Release capsule 1 tablet  capsule 4     No current facility-administered medications for this visit.      Medications have been reviewed by me and are current to the best of my knowledge and ability.      Allergies:  Allergies    "  Allergen  Reactions     Paxil [Paroxetine] Anxiety       Latex allergy  no        Family History       Problem   Relation Age of Onset     Heart Disease  Father      CHF       Thyroid Disease  Daughter      Hypothyroid.       Blood Disease  Daughter      severe anemia with a hgb of less than 5       Good Health  Son      Good Health  Daughter      Thyroid Disease  Daughter      Hypothyroid.       Arthritis  Other      Father's side has rheumatoid arthritis            Social History       Substance Use Topics         Smoking status:   Former Smoker     Smokeless tobacco:   Never Used     Alcohol use   1.2 oz/week     1 Glasses of wine, 1 Standard drinks or equivalent per week        Comment: 1 a night          ROS:    surgical:  patient denies previous complications from prior surgeries including but not limited to prolonged bleeding, anesthesia complications, dysrhythmias, surgical wound infections, or prolonged hospital stay.    Hx of blood transfusions:  NO      -------------------------------------------------------------    PHYSICAL EXAM:  /88  Pulse 94  Temp 97.9  F (36.6  C) (Temporal)  Ht 1.6 m (5' 2.99\")  Wt 72.9 kg (160 lb 12.8 oz)  SpO2 97%  Breastfeeding? No  BMI 28.49 kg/m2    PHYSICAL EXAMINATION  EXAM:   General Appearance: Pleasant, alert, appropriate appearance for age. No acute distress  Ear Exam: Normal TM's bilaterally. Normal auditory canals and external ears. Non-tender.  OroPharynx Exam: Dental hygiene adequate. Normal buccal mucosa. Normal pharynx.  Neck Exam: Supple, no masses or nodes.  Chest/Respiratory Exam: Normal chest wall and respirations. Clear to auscultation.  Cardiovascular Exam: Regular rate and rhythm. S1, S2, no murmur, click, gallop, or rubs.  Gastrointestinal Exam: Soft, nontender, no abnormal masses or organomegaly.  Lymphatic Exam: Non-palpable nodes in neck, clavicular, axillary, or inguinal regions.  Neurologic Exam: Nonfocal;  normal gross motor movement, " tone, and coordination. No tremor.  Psychiatric Exam: Alert and oriented, appropriate affect.      ASSESSMENT/PLAN:    ICD-10-CM    1. HYPERTENSION I10 EKG 12 LEAD UNIT PERFORMED      NH ELECTROCARDIOGRAM TRACING   2. Wrist fracture, right, with routine healing, subsequent encounter S62.101D          LABS:   Jan/ 2017 labs look good; no need for pg test but will have INR today        EKG:  Normal sinus rhythm  ---------------------------------------------------------------    ASSESSEMNT AND PLAN:  1.  Preoperative history and physical   consults:  none    For above listed surgery and anesthesia:     - Patient is low  risk for perioperative complications.      PRE OP RECOMMENDATIONS:  No ASA nor NSAIDS today or before surgery    Daniel Beyer MD, MD..................4/20/2017 8:45 AM

## 2018-01-04 NOTE — PROCEDURES
Patient Information     Patient Name MRN Sex Mervat Morrow [id number] Female 1932      Procedures signed by Gerald Wadsworth DO at 2017  5:16 PM      Author:  Gerald Wadsworth DO Service:  (none) Author Type:  Physician     Filed:  2017  5:16 PM Creation Time:  2017  2:04 PM Status:  Signed     :  Gerald Wadsworth DO (Physician)            -  DATE OF SERVICE:  2017    SURGEON  GERALD WADSWORTH DO    ASSISTANT  None.     PREOPERATIVE DIAGNOSIS   Comminuted intraarticular fracture of the distal right radius.     POSTOPERATIVE DIAGNOSIS   Comminuted intraarticular fracture of the distal right radius.     PROCEDURE   Open reduction and internal fixation of the distal right radius utilizing a [employer]ynthes stainless steel 2.4 mm VA-LCP 2-column right volar distal radius plate with 3 screws in the shaft and 6 in the distal portion of the plate with a T8 Star Drive screwdriver (DOS 2017).     IMPLANTS   As above with the T8 StarDrive screwdriver head, and 1 screw was placed for compression and then removed and a longer screw was placed.      ANESTHESIA  General via LMA as well as a block for pain control.     ESTIMATED BLOOD LOSS  Less than 5.     FLUIDS  See chart.     DRAINS  None.     COMPLICATIONS  None.     DISPOSITION  Stable to postop.     INDICATIONS FOR PROCEDURE  Patient is an 85-year-old female who sustained a slip and fall at her home. She was seen in the ER and then in my office with a noted intraarticular fracture of the distal radius. After consideration of options, she elected for surgical intervention.     PROCEDURE NOTE  After informed consent was received from the patient, having listed all the risks and benefits as noted on the consent form, but not limited to the consent form, and having discussed all conservative, surgical, and alternatives to treatment, patient signed a consent form with a witness present. Patient understood there were  numerous risks. All of them were not written down, but were discussed at length. No guarantees were given. All questions were answered prior to signing consent form.     Patient was given a block according to anesthesia protocol. She received antibiotics one-half hour prior to skin incision. She was taken back to the operating room, supine on a gurney, transferred to the operating room table, secured, and all bony prominences padded. She was then given general anesthesia via LMA. Once proper anesthesia was obtained, her dressings were removed, tourniquet was placed, but not inflated. Patient's right upper extremity was sterilely prepped and draped.     Timeout was performed according to institutional guidelines. With this done, I then checked the C-arm and then performed the reduction. I felt that I could reduce this fracture. I then elevated the arm and inflated the tourniquet. Skin incision only was made with a #15 scalpel. I bluntly dissected until I was able to identify the flexor carpi radialis. I retracted this ulnarly, released the floor of the flexor carpi radialis, bluntly dissected down until the pronator quadratus was identified. I then released this along the ulnar aspect and retracted this also back to the radial aspect. With retractors in place, I was easily able to identify the fracture. I was able to reduce the fracture. I placed one 2.4 mm VA-LCP 2-column right volar distal radial plate that was stainless steel and pinned it in place. I checked multiple views with C-arm, was satisfied. I then placed 1 screw in the oblong hole of the shaft with standard AO technique. I then was satisfied with the position of that. I then removed the K wire. I then placed 2 locking screws in the shaft of the plate, and then also replaced the initial screw with a slightly longer one for its position. With this done, I irrigated the area copiously. I once again checked the reduction and was satisfied. I then placed my  radial styloid screw according to the 's specifications. I checked this with C-arm, was satisfied. I then placed an ulnar styloid screw. With these done, I checked also the articular congruency, was satisfied. I placed 2 more pegs in the distal end of the plate, and then 2 more screws in the next proximal row. A total of 9 screws were implanted. These were all the T8 StarDrive. I then torqued all the heads according to the 's specifications. I irrigated the area, released the tourniquet, maintained hemostasis, took my permanent films. I irrigated again. I closed the pronator quadratus over the plate with 3-0 Monocryl interrupted. I closed the floor of the flexor carpi radialis with 3-0 Vicryl interrupted. Deep subcutaneous was closed with 3-0 Monocryl in interrupted fashion. Skin was closed with a 4-0 Stratafix with a plastic technique. Steri-Strips were applied.     The area was infiltrated with local. Sterile dressings including Xeroform, 4 x 4, and a well-padded cockup wrist splint was applied. Patient was extubated, transferred back to the [facility], and taken to the recovery room in satisfactory condition. She will be discharged home per protocol. She was given a prescription for Norco 5/325, #30, no refills, and Keflex. She will follow up in my office as already arranged. She is to rest, ice, and elevate her wrist.           DO BRIONNA RUIZ/sk   D:  2017 13:31:00  T:  2017 13:51:38  Voice Job ID:  [id number]  Text Job ID:  [id number]  cc:FLEX LEAL MD, PRIMARY PHYSICIAN         [facility] [place], [place]NAME:  MERARI ABAD  MR#:  [id number]  :  [id number]  DATE:  2017  LOCATION:  [facility]  ROOM:  OR  TYPE:  AMBOPERATIVE / PROCEDUREPage 1 of 3

## 2018-01-04 NOTE — OR ANESTHESIA
Patient Information     Patient Name MRN Sex     Mervat Lockett [id number] Female 1932      OR Anesthesia by Romi Smith MD at 2017 12:03 PM     Author:  Romi Smith MD  Service:  (none) Author Type:  PHYS- Anesthesiologist     Filed:  2017 12:11 PM  Date of Service:  2017 12:03 PM Status:  Addendum     :  Romi Smith MD (PHYS- Anesthesiologist)        Related Notes: Original Note by Romi Smith MD (PHYS- Anesthesiologist) filed at 2017 12:05 PM            SUPRACLAVICULAR NERVE BLOCK FOR POST-OP PAIN MANAGEMENT  [facility]    Patient: MERVAT LOCKETT  Patient : 1932  Date: 2017  Procedure time:  1125 to 1135  Diagnosis: Wrist pain.    Surgeon requests supraclavicular brachial plexus block for post-op pain management.    The procedure, potential benefits, risks, and alternatives were discussed during the preoperative interview with the patient.  The patient voiced understanding of the information and agreed to proceed with the procedure.    Universal protocol was followed.  TIME OUT was conducted just prior to starting the procedure and confirmed patient identity, site/side, procedure, patient position, and availability of correct equipment and implants    Full continuous monitors and supplemental oxygen per nasal cannula were placed pre-procedure.  Sedation:  midazolam 2 mg IV & fentanyl 25 mcg IV.  Anesthetic:  0.5% ropivacaine subcutaneously.    The right supraclavicular area was scanned with the ultrasound to locate the brachial plexus adjacent to the subclavian artery.  The area was prepped with chlorhexidine.  The area above the clavicle was injected with subcutaneous local anesthesia.  A 22 gauge 4 inch stimuplex insulated needle was advanced lateral to the artery and adjacent to the plexus, but not within the plexus. It was confirmed with the nerve stimulator at 0.4 mAmp.  After negative aspiration, a 1 mL  test dose of local was administered.  The patient denied tinnitus, metallic taste in the mouth or pain on injection of the test dose.  A 5 mL bolus was injected easily and painlessly and demonstrated hydrodissection around the nerve.  A total of 30 mL of 0.5% ropivacaine with 2 mg preservative free dexamethasone was injected incrementally, with negative aspiration every 5 ml.  There were no other immediate complications apparent.  The patient tolerated the procedure well.       A copy of the ultrasound image was printed and will be scanned into the EMR.      Electronically Signed by  TABATHA ORLANDO MD

## 2018-01-04 NOTE — PROGRESS NOTES
"Patient Information     Patient Name MRN Sex Mervat Morrow [id number] Female 1932      Progress Notes by Gerald Martínez DO at 2017 10:00 AM     Author:  Gerald Martínez DO Service:  (none) Author Type:  Physician     Filed:  2017 10:48 AM Encounter Date:  2017 Status:  Signed     :  Gerald Martínez DO (Physician)            PROGRESS NOTE    SUBJECTIVE:  Mervat Lockett is here for evaluation in regards to her right wrist. She is doing well with expected discomfort.    OBJECTIVE:  /70  Temp 97.8  F (36.6  C) (Tympanic)   Ht 1.6 m (5' 3\") There is no height or weight on file to calculate BMI.    General Appearance: Pleasant female in good appearance, mood and affect.  Alert and orientated times three ( time, date and location).    Incision:  Clean and dry, closed, no infection    Wrist:  Positive palpable pain, and this is anticipated.  Motion: Decreased due to recent surgery.    Shoulder:  Motion: full    Elbow:  Flexion: Normal  Extension: Normal  Deep tendon reflexes: Normal    Hand:  Sensation: Normal  Radial and ulnar blood flow:  Normal    Heart: regular rate and rhythm    Lungs: Clear.     ASSESSMENT:    POSTOPERATIVE DIAGNOSIS   Comminuted intraarticular fracture of the distal right radius.      PROCEDURE   Open reduction and internal fixation of the distal right radius utilizing a Synthes stainless steel 2.4 mm VA-LCP 2-column right volar distal radius plate with 3 screws in the shaft and 6 in the distal portion of the plate with a T8 Star Drive screwdriver (DOS 2017).    PLAN:    Steri strip reapplied and wound care explained.  Follow-up on 4 weeks with cast off and x-ray first.  They know to call if there is any other problems.  Questions and concerns answered.    PROCEDURAL NOTE:    Patient was placed into a right short arm fiberglass cast that was molded appropriately.  She  tolerated the procedure well without complications.     Cast " care instructions where given.     Follow up as ordered.    Gerald Martínez D.O.  Orthopaedic Surgeon    [facility]  [address] [place] [address]  Phone [phone] (KNEE)  Fax [phone]    This document was created using computer generated templates and voice activated software.    10:46 AM 5/1/2017

## 2018-01-04 NOTE — H&P
Patient Information     Patient Name MRN Mervat Leon [id number] Female 1932      H&P by Daniel Beyer MD at 2017  8:15 AM     Author:  Daniel Beyer MD Service:  (none) Author Type:  Physician     Filed:  2017  8:57 AM Encounter Date:  2017 Status:  Signed     :  Daniel Beyer MD (Physician)            ----------------- PREOPERATIVE EXAM ------------------  2017    SUBJECTIVE:  Mervat Lockett is a 85 y.o. female here for preop.    I was asked to see Mervat Lockett by Dr. Martínez for a preoperative history and physical.      Date of Surgery:   Type of Surgery: ORIF  Surgeon: [facility]:  [facility]    HPI:  Ms Lockett tripped and fell 17 sustaining a fracture to R wrist. There is a fair amount of angulation. Surgical repair is planned. She has a splint on / surgery is tomorrow.   Patient has no personal nor FH of life threatening anesthesia complications. Patient denies unusual bleeding tendencies. No recent history of cough, fever,cold, sweats, chills.       Patient Active Problem List       Diagnosis  Date Noted     Closed fracture of right radius and ulna  2017     DJD (degenerative joint disease)  2015     Osteoarthritis of knee  2014     ACP (advance care planning)  2013     Lipoma  2013     Seborrheic keratosis  2013     DEPRESSION/ANXIETY       NECK PAIN  2012     ARM PAIN, LEFT  2012     B12 DEFICIENCY  2012     IRON DEFICIENCY  2012     FATIGUE  2011     SACROILIAC JOINT DYSFUNCTION  2011     HIP PAIN, LEFT  2010     HYPERTENSION  2010     HYPERCHOLESTEROLEMIA       ANXIETY, CHRONIC       Chronic anxiety          SENILE KERATOSIS       Senile keratosis left abdomen          GASTROINTESTINAL DISORDER, PSYCHOGENIC       History of psychological gastrointestinal distress          ABDOMINAL PAIN, EPIGASTRIC       Peptic acid disease          SPINAL STENOSIS,  LUMBAR       Degenerative lumbar disc disease with lumbar spinal stenosis          OSTEOPENIA         Past Medical History:     Diagnosis  Date     Closed fracture of right radius and ulna 04/13/2017     Hot flashes     Occasional hot flashes      Hx of being hospitalized     Hospitalization for tachycardia and PVCs      Hx of pregnancy     G5, P4-0-1-4      Menopause     with mild symptoms      Osteoarthritis of knee 11/6/2014     Plantar wart        Past Surgical History:      Procedure  Laterality Date     BLEPHAROPLASTY  4/18/06    by Dr. White, left brow lift.        CATARACT REMOVAL  2010    Bilateral cataract extraction - Dr. White       CERVICAL POLYPECTOMY      Endocervical polyps       CYSTOCELE REPAIR      Uterine prolapse and vaginal vault prolapse with cystocele       HIP ARTHROPLASTY Right      HIP REPLACEMENT  3/9/09    Left, by Ashish Ivory M.D.       HIP SURGERY Left 2012    repair left hip / [facility]        SALPINGO-OOPHORECTOMY  4/16/98    Bilateral salpingo oophorectomy and vaginal vault suspension       TOTAL ABDOMINAL HYSTERECTOMY      Rectocele         Current Outpatient Prescriptions       Medication  Sig Dispense Refill     esomeprazole (NEXIUM) 40 mg capsule Take 1 capsule by mouth once daily before a meal. 90 capsule 3     HYDROcodone-acetaminophen, 5-325 mg, (NORCO) per tablet Take 1-2 tablets by mouth every 4 hours if needed  for Pain Max acetaminophen dose: 4000 mg in 24 hrs. 20 tablet 0     metoprolol tartrate (LOPRESSOR) 50 mg tablet Take 0.5 tablets by mouth 2 times daily. 45 tablet 3     mirtazapine (REMERON) 15 mg tablet Take 0.5-1 tablets by mouth at bedtime. 30 tablet 6     venlafaxine (EFFEXOR XR) 37.5 mg Extended-Release capsule 1 tablet  capsule 4     No current facility-administered medications for this visit.      Medications have been reviewed by me and are current to the best of my knowledge and ability.      Allergies:  Allergies     Allergen  Reactions     Paxil  "[Paroxetine] Anxiety       Latex allergy  no        Family History       Problem   Relation Age of Onset     Heart Disease  Father      CHF       Thyroid Disease  Daughter      Hypothyroid.       Blood Disease  Daughter      severe anemia with a hgb of less than 5       Good Health  Son      Good Health  Daughter      Thyroid Disease  Daughter      Hypothyroid.       Arthritis  Other      Father's side has rheumatoid arthritis            Social History       Substance Use Topics         Smoking status:   Former Smoker     Smokeless tobacco:   Never Used     Alcohol use   1.2 oz/week     1 Glasses of wine, 1 Standard drinks or equivalent per week        Comment: 1 a night          ROS:    surgical:  patient denies previous complications from prior surgeries including but notlimited to prolonged bleeding, anesthesia complications, dysrhythmias, surgical wound infections, or prolonged hospital stay.    Hx of blood transfusions:  NO      -------------------------------------------------------------    PHYSICAL EXAM:  /88  Pulse 94  Temp 97.9  F (36.6  C) (Temporal)  Ht 1.6 m (5' 2.99\")  Wt 72.9 kg (160 lb 12.8 oz)  SpO2 97%  Breastfeeding? No  BMI 28.49 kg/m2    PHYSICAL EXAMINATION  EXAM:   General Appearance: Pleasant, alert, appropriate appearance for age. No acute distress  Ear Exam: Normal TM's bilaterally. Normal auditory canals and external ears. Non-tender.  OroPharynx Exam: Dental hygiene adequate. Normal buccal mucosa. Normal pharynx.  Neck Exam: Supple, no masses or nodes.  Chest/Respiratory Exam: Normal chest wall and respirations. Clear to auscultation.  Cardiovascular Exam: Regular rate and rhythm. S1, S2, no murmur, click, gallop, or rubs.  Gastrointestinal Exam: Soft, nontender, no abnormal masses or organomegaly.  Lymphatic Exam: Non-palpable nodes in neck, clavicular, axillary, or inguinal regions.  Neurologic Exam: Nonfocal;  normal gross motor movement, tone, and coordination. No " tremor.  Psychiatric Exam: Alert and oriented, appropriate affect.      ASSESSMENT/PLAN:    ICD-10-CM    1. HYPERTENSION I10 EKG 12 LEAD UNIT PERFORMED      RI ELECTROCARDIOGRAM TRACING   2. Wrist fracture, right, with routine healing, subsequent encounter S62.101D          LABS:   Jan/ 2017 labs look good; no need for pg test but will have INR today        EKG:  Normal sinus rhythm  ---------------------------------------------------------------    ASSESSEMNT AND PLAN:  1.  Preoperative history and physical   consults:  none    For above listed surgery and anesthesia:     - Patient is low  risk for perioperative complications.      PRE OP RECOMMENDATIONS:  No ASA nor NSAIDS today or before surgery    Daniel Beyer MD, MD..................4/20/2017 8:45 AM

## 2018-01-04 NOTE — TELEPHONE ENCOUNTER
Patient Information     Patient Name MRN Mervat Leon [id number] Female 1932      Telephone Encounter by Leana Haney at 2017  1:05 PM     Author:  Leana Haney Service:  (none) Author Type:  (none)     Filed:  2017  1:09 PM Encounter Date:  2017 Status:  Signed     :  Leana Haney            Having  call patient with a 4 pm appointment for Monday the  per Dr. Martínez.  Leana Haney LPN .......2017 1:06 PM

## 2018-01-04 NOTE — OR ANESTHESIA
Patient Information     Patient Name MRN Sex Mervat Morrow [id number] Female 1932      OR Anesthesia by Romi Smith MD at 2017 11:00 AM     Author:  Romi Smith MD  Service:  (none) Author Type:  PHYS- Anesthesiologist     Filed:  2017 12:42 PM  Date of Service:  2017 11:00 AM Status:  Addendum     :  Romi Smith MD (PHYS- Anesthesiologist)        Related Notes: Original Note by Romi Smith MD (PHYS- Anesthesiologist) filed at 2017 11:00 AM                                                           ANESTHESIA ASSESSMENT    Date: 17 Time: 11:00 AM      Patient:  Mervat Lockett    Procedure(s) (LRB):  OPEN REDUCTION INTERNAL FIXATION RADIUS DISTAL (Right)    Past Medical History:     Diagnosis  Date     Closed fracture of right radius and ulna 2017     Hot flashes     Occasional hot flashes      Hx of being hospitalized     Hospitalization for tachycardia and PVCs      Hx of pregnancy     G5, P4-0-1-4      Menopause     with mild symptoms      Osteoarthritis of knee 2014     Plantar wart      S/P ORIF right distal radius 2017       Past Surgical History:      Procedure  Laterality Date     BLEPHAROPLASTY  06    by Dr. White, left brow lift.        CATARACT REMOVAL      Bilateral cataract extraction - Dr. White       CERVICAL POLYPECTOMY      Endocervical polyps       CYSTOCELE REPAIR      Uterine prolapse and vaginal vault prolapse with cystocele       HIP ARTHROPLASTY Right      HIP REPLACEMENT  3/9/09    Left, by Ashish Ivory M.D.       HIP SURGERY Left     repair left hip / [facility]        S/P ORIF OF RIGHT DISTAL RADIUS Right 2017    Synthes stainless steel variable angle volar radial plate with T8 screw heads.       SALPINGO-OOPHORECTOMY  98    Bilateral salpingo oophorectomy and vaginal vault suspension       TOTAL ABDOMINAL HYSTERECTOMY      Rectocele         Family History       Problem    Relation Age of Onset     Heart Disease  Father      CHF       Thyroid Disease  Daughter      Hypothyroid.       Blood Disease  Daughter      severe anemia with a hgb of less than 5       Good Health  Son      Good Health  Daughter      Thyroid Disease  Daughter      Hypothyroid.       Arthritis  Other      Father's side has rheumatoid arthritis         Patient Active Problem List     Diagnosis  Code     HYPERTENSION I10     HIP PAIN, LEFT M25.559     SACROILIAC JOINT DYSFUNCTION M53.3     FATIGUE R53.81, R53.83     NECK PAIN M54.2     ARM PAIN, LEFT M79.609     B12 DEFICIENCY E53.8     IRON DEFICIENCY D50.9     DEPRESSION/ANXIETY F34.1     HYPERCHOLESTEROLEMIA E78.00     ANXIETY, CHRONIC F41.1     SENILE KERATOSIS L57.0     GASTROINTESTINAL DISORDER, PSYCHOGENIC F45.8     ABDOMINAL PAIN, EPIGASTRIC R10.13     SPINAL STENOSIS, LUMBAR M48.06     OSTEOPENIA M89.9, M94.9     Lipoma D17.9     Seborrheic keratosis L82.1     ACP (advance care planning) Z71.89     Osteoarthritis of knee M17.10     DJD (degenerative joint disease) M19.90     Closed fracture of right radius and ulna S52.531D     S/P ORIF right distal radius Z96.7, Z87.81       Prescriptions Prior to Admission       Medication  Sig Dispense Refill     esomeprazole (NEXIUM) 40 mg capsule Take 1 capsule by mouth once daily before a meal. 90 capsule 3     HYDROcodone-acetaminophen, 5-325 mg, (NORCO) per tablet Take 1-2 tablets by mouth every 4 hours if needed  for Pain Max acetaminophen dose: 4000 mg in 24 hrs. 20 tablet 0     metoprolol tartrate (LOPRESSOR) 50 mg tablet Take 0.5 tablets by mouth 2 times daily. 45 tablet 3     mirtazapine (REMERON) 15 mg tablet Take 0.5-1 tablets by mouth at bedtime. 30 tablet 6     venlafaxine (EFFEXOR XR) 37.5 mg Extended-Release capsule 1 tablet  capsule 4       Allergies:  Allergies     Allergen  Reactions     Paxil [Paroxetine] Anxiety       Review of Systems:  GERD: No (No symptoms on Nexium.)  Chest pain:  No  Shortness of breath: No  Recent fever: No  Poor exercise tolerance: No (Likes to garden & takes care of her own home.)  Bleeding tendency: No  Pregnant: No  Anesthesia Complications: None      History    Smoking Status      Former Smoker   Smokeless Tobacco      Never Used     Social History     Social History        Marital status:       Spouse name: N/A     Number of children:  N/A     Years of education:  N/A     Social History Main Topics         Smoking status:   Former Smoker     Smokeless tobacco:   Never Used     Alcohol use   1.2 oz/week     1 Glasses of wine, 1 Standard drinks or equivalent per week        Comment: 1 a night      Drug use:   No     Sexual activity:   Not on file     Other Topics  Concern     Seat Belt Yes     Social History Narrative     She is  with four grown children.  She is retired.   ~No tobacco.  Alcohol - infrequently.  ~ - Eddi - cell #[phone]    ~Patient's cell/cabin # [phone]    ~Daughter - Renetta Sher    ~Daughter - Johanna Saucedo    ~Son - Joni Lockett, lives in [place], has problems with alcoholism.    ~Mituldaphne Preciado Cat - lives in [place], [place]    p 7/1/2013.       Physical Examination:  There were no vitals taken for this visit. There is no height or weight on file to calculate BMI. There is no height or weight on file to calculate BSA.  Dental Condition: Good     Mallampati Score (Airway): II (Short TMD; stiff neck.)  Cardiovascular: Normal  Pulmonary: Normal  Other: N/A    Recent Labs in [facility]:    Recent Labs       04/20/17   0927   INR  1.1             Assessment/Plan:  ASA Class: III  Risk of dental injury discussed: Yes  NPO status confirmed: Yes  Anesthetic Plan:  Regional (Supraclavicular block plus sedation.)  Proceed to GA-LMA prn.  Risk/Benefit/Alt discussed: Yes  Questions answered: Yes  Emergency Case?: No  Labs/ECG/Radiology Reviewed?: Yes      H&P Reviewed.  Patient Examined.      Provider Electronic Signature:  TABATHA ORLANDO  MD

## 2018-01-04 NOTE — OR NURSING
Patient Information     Patient Name MRN Sex Mervat Morrow [id number] Female 1932      OR Nursing by Lois Max RN at 2017  4:25 PM     Author:  Lois Max RN Service:  (none) Author Type:  NURS- Registered Nurse     Filed:  2017  5:00 PM Date of Service:  2017  4:25 PM Status:  Signed     :  Lois Max RN (NURS- Registered Nurse)            Discharge Note    Data:  Mervat Lockett has been discharged home at 1625 via wheelchair accompanied by Registered Nurse.      Action:  Written discharge/follow-up instructions were provided to patient and Spouse. Prescriptions were written and sent with patient.  Belongings sent with patient. Medications from home sent with patient/family: Not Applicable  Equipment none .     Response:  Patient and Spouse verbalized understanding of discharge instructions, reason for discharge, and necessary follow-up appointments.

## 2018-01-04 NOTE — INTERVAL H&P NOTE
Patient Information     Patient Name MRN Sex Mervat Morrow [id number] Female 1932      Interval H&P Note by Gerald Martínez DO at 2017 11:03 AM     Author:  Gerald Martínez DO Service:  (none) Author Type:  Physician     Filed:  2017 11:03 AM Date of Service:  2017 11:03 AM Status:  Signed     :  Gerald Martínez DO (Physician)            History and Physical Update    The history and physical has been reviewed and the patient's right wrist has been examined.  There are no interim changes to the patient's history or physical condition.  She is ready to proceed with planned procedure.  She understands the risks and benefits and once again these where outlined.    Gerald Martínez DO  Orthopedic Surgeon        Source Note     Author:  Daniel Beyer MD Service:  (none) Author Type:  Physician    Filed:  2017  8:57 AM Date of Service:  2017  8:15 AM Status:  Signed    :  Daniel Beyer MD (Physician)              ----------------- PREOPERATIVE EXAM ------------------  2017    SUBJECTIVE:  Mervat Lockett is a 85 y.o. female here for preop.    I was asked to see Mervat Lockett by Dr. Martínez for a preoperative history and physical.      Date of Surgery:   Type of Surgery: ORIF  Surgeon: Martínez  [facility]:  [facility]    HPI:  Ms Lockett tripped and fell 17 sustaining a fracture to R wrist. There is a fair amount of angulation. Surgical repair is planned. She has a splint on / surgery is tomorrow.   Patient has no personal nor FH of life threatening anesthesia complications. Patient denies unusual bleeding tendencies. No recent history of cough, fever,cold, sweats, chills.       Patient Active Problem List       Diagnosis  Date Noted     Closed fracture of right radius and ulna  2017     DJD (degenerative joint disease)  2015     Osteoarthritis of knee  2014     ACP (advance care planning)  2013     Lipoma   07/03/2013     Seborrheic keratosis  07/03/2013     DEPRESSION/ANXIETY       NECK PAIN  03/09/2012     ARM PAIN, LEFT  03/09/2012     B12 DEFICIENCY  03/09/2012     IRON DEFICIENCY  03/09/2012     FATIGUE  06/28/2011     SACROILIAC JOINT DYSFUNCTION  01/28/2011     HIP PAIN, LEFT  12/16/2010     HYPERTENSION  11/18/2010     HYPERCHOLESTEROLEMIA       ANXIETY, CHRONIC       Chronic anxiety          SENILE KERATOSIS       Senile keratosis left abdomen          GASTROINTESTINAL DISORDER, PSYCHOGENIC       History of psychological gastrointestinal distress          ABDOMINAL PAIN, EPIGASTRIC       Peptic acid disease          SPINAL STENOSIS, LUMBAR       Degenerative lumbar disc disease with lumbar spinal stenosis          OSTEOPENIA         Past Medical History:     Diagnosis  Date     Closed fracture of right radius and ulna 04/13/2017     Hot flashes     Occasional hot flashes      Hx of being hospitalized     Hospitalization for tachycardia and PVCs      Hx of pregnancy     G5, P4-0-1-4      Menopause     with mild symptoms      Osteoarthritis of knee 11/6/2014     Plantar wart        Past Surgical History:      Procedure  Laterality Date     BLEPHAROPLASTY  4/18/06    by Dr. White, left brow lift.        CATARACT REMOVAL  2010    Bilateral cataract extraction - Dr. White       CERVICAL POLYPECTOMY      Endocervical polyps       CYSTOCELE REPAIR      Uterine prolapse and vaginal vault prolapse with cystocele       HIP ARTHROPLASTY Right      HIP REPLACEMENT  3/9/09    Left, by Ashish Ivory M.D.       HIP SURGERY Left 2012    repair left hip / [facility]        SALPINGO-OOPHORECTOMY  4/16/98    Bilateral salpingo oophorectomy and vaginal vault suspension       TOTAL ABDOMINAL HYSTERECTOMY      Rectocele         Current Outpatient Prescriptions       Medication  Sig Dispense Refill     esomeprazole (NEXIUM) 40 mg capsule Take 1 capsule by mouth once daily before a meal. 90 capsule 3     HYDROcodone-acetaminophen,  "5-325 mg, (NORCO) per tablet Take 1-2 tablets by mouth every 4 hours if needed  for Pain Max acetaminophen dose: 4000 mg in 24 hrs. 20 tablet 0     metoprolol tartrate (LOPRESSOR) 50 mg tablet Take 0.5 tablets by mouth 2 times daily. 45 tablet 3     mirtazapine (REMERON) 15 mg tablet Take 0.5-1 tablets by mouth at bedtime. 30 tablet 6     venlafaxine (EFFEXOR XR) 37.5 mg Extended-Release capsule 1 tablet  capsule 4     No current facility-administered medications for this visit.      Medications have been reviewed by me and are current to the best of my knowledge and ability.      Allergies:  Allergies     Allergen  Reactions     Paxil [Paroxetine] Anxiety       Latex allergy  no        Family History       Problem   Relation Age of Onset     Heart Disease  Father      CHF       Thyroid Disease  Daughter      Hypothyroid.       Blood Disease  Daughter      severe anemia with a hgb of less than 5       Good Health  Son      Good Health  Daughter      Thyroid Disease  Daughter      Hypothyroid.       Arthritis  Other      Father's side has rheumatoid arthritis            Social History       Substance Use Topics         Smoking status:   Former Smoker     Smokeless tobacco:   Never Used     Alcohol use   1.2 oz/week     1 Glasses of wine, 1 Standard drinks or equivalent per week        Comment: 1 a night          ROS:    surgical:  patient denies previous complications from prior surgeries including but not limited to prolonged bleeding, anesthesia complications, dysrhythmias, surgical wound infections, or prolonged hospital stay.    Hx of blood transfusions:  NO      -------------------------------------------------------------    PHYSICAL EXAM:  /88  Pulse 94  Temp 97.9  F (36.6  C) (Temporal)  Ht 1.6 m (5' 2.99\")  Wt 72.9 kg (160 lb 12.8 oz)  SpO2 97%  Breastfeeding? No  BMI 28.49 kg/m2    PHYSICAL EXAMINATION  EXAM:   General Appearance: Pleasant, alert, appropriate appearance for age. No acute " distress  Ear Exam: Normal TM's bilaterally. Normal auditory canals and external ears. Non-tender.  OroPharynx Exam: Dental hygiene adequate. Normal buccal mucosa. Normal pharynx.  Neck Exam: Supple, no masses or nodes.  Chest/Respiratory Exam: Normal chest wall and respirations. Clear to auscultation.  Cardiovascular Exam: Regular rate and rhythm. S1, S2, no murmur, click, gallop, or rubs.  Gastrointestinal Exam: Soft, nontender, no abnormal masses or organomegaly.  Lymphatic Exam: Non-palpable nodes in neck, clavicular, axillary, or inguinal regions.  Neurologic Exam: Nonfocal;  normal gross motor movement, tone, and coordination. No tremor.  Psychiatric Exam: Alert and oriented, appropriate affect.      ASSESSMENT/PLAN:    ICD-10-CM    1. HYPERTENSION I10 EKG 12 LEAD UNIT PERFORMED      SD ELECTROCARDIOGRAM TRACING   2. Wrist fracture, right, with routine healing, subsequent encounter S62.101D          LABS:   Jan/ 2017 labs look good; no need for pg test but will have INR today        EKG:  Normal sinus rhythm  ---------------------------------------------------------------    ASSESSEMNT AND PLAN:  1.  Preoperative history and physical   consults:  none    For above listed surgery and anesthesia:     - Patient is low  risk for perioperative complications.      PRE OP RECOMMENDATIONS:  No ASA nor NSAIDS today or before surgery    Daniel Beyer MD, MD..................4/20/2017 8:45 AM

## 2018-01-04 NOTE — OR NURSING
Patient Information     Patient Name MRN Mervat Leon [id number] Female 1932      OR Nursing by Denisa Mccabe RN at 2017 11:58 AM     Author:  Denisa Mccabe RN Service:  (none) Author Type:  NURS- Registered Nurse     Filed:  2017 12:52 PM Date of Service:  2017 11:58 AM Status:  Signed     :  Denisa Mccabe RN (NURS- Registered Nurse)            Handoff report from Anette Callahan RN

## 2018-01-04 NOTE — ADDENDUM NOTE
Patient Information     Patient Name MRN Sex Mervat Morrow [id number] Female 1932      Addendum Note by Radha Cheng at 2017  9:15 AM     Author:  Radha Cheng Service:  (none) Author Type:  (none)     Filed:  2017  9:15 AM Encounter Date:  2017 Status:  Signed     :  Radha Cheng       Addended by: RADHA CHENG on: 2017 09:15 AM        Modules accepted: Orders

## 2018-01-04 NOTE — NURSING NOTE
Patient Information     Patient Name MRN Sex Mervat Morrow [id number] Female 1932      Nursing Note by Denisa Fisher at 2017  3:45 PM     Author:  Denisa Fisher Service:  (none) Author Type:  (none)     Filed:  2017  3:42 PM Encounter Date:  2017 Status:  Signed     :  Denisa Fisher            Pt presents for a consult s/p right wrist fracture, doi . Referred by Dr. Tari Fisher, [facility] 2017 3:41 PM

## 2018-01-04 NOTE — NURSING NOTE
Patient Information     Patient Name MRN Sex Mervat Morrow [id number] Female 1932      Nursing Note by Denisa Fisher at 2017 10:00 AM     Author:  Denisa Fisher Service:  (none) Author Type:  (none)     Filed:  2017  9:58 AM Encounter Date:  2017 Status:  Signed     :  Denisa Fisher            Pt presents for a post op s/p right wrist ORIF, dos     Denisa Fisher CMA 2017 9:58 AM

## 2018-01-04 NOTE — OR NURSING
Patient Information     Patient Name MRN Mervat Leon [id number] Female 1932      OR Nursing by Lois Max RN at 2017  4:15 PM     Author:  Lois Max RN Service:  (none) Author Type:  NURS- Registered Nurse     Filed:  2017  4:59 PM Date of Service:  2017  4:15 PM Status:  Signed     :  Lois Max RN (NURS- Registered Nurse)            Received handoff report at 1605 from Estelita LAU RN.

## 2018-01-04 NOTE — OR ANESTHESIA
Patient Information     Patient Name MRN Sex     Mervat Lockett [id number] Female 1932      OR Anesthesia by Romi Smith MD at 2017  3:34 PM     Author:  Romi Smith MD Service:  (none) Author Type:  PHYS- Anesthesiologist     Filed:  2017  3:37 PM Date of Service:  2017  3:34 PM Status:  Signed     :  Romi Smith MD (PHYS- Anesthesiologist)            Anesthesia Post Operative Care Note    Name: Mervat Lockett  MRN:   [id number]  :    1932       Procedure Done:  See Surgeon Note   Case Cancelled for Anesthetic Reason:  No     Post Op Considerations:  Other       Other Recommendations:  Anesthesia plan changed from block to GA-LMA for failed supraclavicular block.  Required IV pain medications in PACU.    Anesthesia Technique    Anesthetic Type:  General     Airway Management:  LMA     Oral Trauma:  No    Intraoperative Course   Hemodynamics:  Stable    Ventilation Normal:  Yes Lung Sounds:  Normal      PACU Course      Nondepolarizer Used: No    Reintubation:  No   Hemodynamics:  Stable      Hydration: Euvolemic   Temperature:  36.1 - 38.3      Mental Status:  Awake, alert, follows commands   Pain Management:  Adequate     Regional Block:  Yes       Regional Block Outcome:  Inadequate           Regional Block Action:  Other               Other Action Specify:  Treated with IV pain medication in PACU with adequate relief.   Anesthesia Complications:  None      Vital Signs:  Temp: 97.6  F (36.4  C)  Pulse: 64  BP: 137/69  Resp: 16  SpO2: 93 %    O2 Device: Nasal Cannula         Level of Nausea: None        Active Lines:  Patient Lines/Drains/Airways Status    Active Line     Name: Placement date: Placement time: Site: Days:    PERIPHERAL VAD Left Hand 17   1107   Hand   less than 1                Intake & Output:  Date  17 1500 - 17 0659(Not Admitted)   17 0700 - 17 0659      Shift  5550-1244 7432-2052 24 Hour Total  7444-3515 4324-8155 1911-2807 24 Hour Total   I  N  T  A  K  E   Intravenous    1000   1000       +I/O+  Maint IV (lactated ringers infusion)    1000   1000    Shift Total    1000   1000   O  U  T  P  U  T   Shift Total          NET     1000   1000   Weight (kg)                 Labs:  No results for input(s): CM4OZUKJVXB, OZG5ELFUORID, PHARTERIAL, TNL5HEKDVAJV, D0TWKUMKWEBQ in the last 24 hours.    No results for input(s): MAGNESIUM in the last 24 hours.    No results for input(s): GLUCOSEMETER in the last 720 hours.        TABATHA ORLANDO MD ....................  4/21/2017   3:34 PM

## 2018-01-04 NOTE — PROCEDURES
Patient Information     Patient Name MRN Sex Mervat Morrow [id number] Female 1932      Procedures by Gerald Martínez DO at 2017  1:24 PM     Author:  Gerald Martínez DO Service:  (none) Author Type:  Physician     Filed:  2017  1:25 PM Date of Service:  2017  1:24 PM Status:  Signed     :  Gerald Martínez DO (Physician)            POSTOPERATIVE / POSTPROCEDURE NOTE - IMMEDIATE :    Surgeon(s)/Proceduralist(s) and Assistants (if any):  Surgeon(s):  Gerald Martínez DO    Procedure(s):  ORIF of right distal radius    Procedure(s) findings:   See op note    Specimen(s) removed: no    (EBL) Estimated blood loss (ml): 5    Postoperative/Postprocedure Diagnosis:   See op note    Gerald Martínez D.O.  Orthopaedic Surgeon    [address] [address]  Phone [phone] (KNEE)  Fax [phone]    1:25 PM 2017

## 2018-01-04 NOTE — NURSING NOTE
Patient Information     Patient Name MRN Sex Mervat Morrow [id number] Female 1932      Nursing Note by Radha Cheng at 2017  8:15 AM     Author:  Radha Cheng Service:  (none) Author Type:  (none)     Filed:  2017  8:29 AM Encounter Date:  2017 Status:  Signed     :  Radha Cheng            This patient presents today for a Preoperative exam for this procedure: Surgery on wrist fracture   Date of Surgery: 17   Surgeon:  Dr. Martínez  Facility:  [facility]  Fax:    Radah Cheng LPN....................2017 8:11 AM

## 2018-01-04 NOTE — INITIAL ASSESSMENTS
Patient Information     Patient Name MRN Sex Mervat Morrow [id number] Female 1932      Initial Assessments by Santiago Yung PT at 4/3/2017  1:17 PM     Author:  Santiago Yung PT Service:  (none) Author Type:  PT- Physical Therapist     Filed:  2017  8:48 AM Date of Service:  4/3/2017  1:17 PM Status:  Signed     :  Santiago Yung PT (PT- Physical Therapist)            [facility]  Outpatient PT - Initial Evaluation  Spine Eval    Date of Service: 4/3/2017     Visit 1/10    Patient Name: Mervat Lockett   YOB: 1932   Referring MD/Provider: Dr. Beyer   Medical and Treatment Diagnosis: Impaired mobility  PT Treatment Diagnosis: impaired gait, impaired balance, functional weakness,   Insurance: Medicare   Start of Service: 17  Certification Dates: Start of Service: 17  Medicare/MA Re-Cert Due: 17     Precautions:  None   Cognition:  Oriented to Person, Place, and Time.     Were cultural / age or other special adaptations needed? No      Patient is a vulnerable adult: No      Patient is aware of diagnosis: Yes      Risks and benefits explained: Yes      PT Orders: Has mobility symptoms suggestive of spinal stenosis/ would like help with developing exercise program and possible use of assistive device if it would help          Subjective        History:   Patient states she has limited walking distance due to low back and groin pain.  The longer she walks, the more pain she experiences.  Pain decreases if she sits down.  She also reports she walks at a slow pace. States at the grocery store she will use an electric cart.  She likes to be active working outside. Yesterday she moved rock around her yard. She denies any radicular symptoms.  She typically walks without an assistive device.  She does have a cane which she used some over the winter.  She is reluctant to use an assistive device more frequently at this time.       Rates pain: no  pain at rest.  Pain will increase to a 10/10 at times.   Describes pain: achy, dull  Locates pain: low back    Current functional limitations: walking is limited to half a block,  Standing limited to 15 minutes, household chores that involve bending are limited.     Prior Level:  Minimal to no difficulty completing the above functional activities.     Past Medical History:     Diagnosis  Date     Hot flashes     Occasional hot flashes      Hx of being hospitalized     Hospitalization for tachycardia and PVCs      Hx of pregnancy     G5, P4-0-1-4      Menopause     with mild symptoms      Osteoarthritis of knee 11/6/2014     Plantar wart      Past Surgical History:      Procedure  Laterality Date     BLEPHAROPLASTY  4/18/06    by Dr. White, left brow lift.        CATARACT REMOVAL  2010    Bilateral cataract extraction - Dr. White       CERVICAL POLYPECTOMY      Endocervical polyps       CYSTOCELE REPAIR      Uterine prolapse and vaginal vault prolapse with cystocele       HIP REPLACEMENT  3/9/09    Left, by Ashish Ivory M.D.       SALPINGO-OOPHORECTOMY  4/16/98    Bilateral salpingo oophorectomy and vaginal vault suspension       TOTAL ABDOMINAL HYSTERECTOMY      Rectocele           Occupation:  Retired     Previous Treatment:    Pain Meds / Anti-inflammatory Meds - Tylenol as needed  Physical Therapy - No   Injections - No   Surgery - No   Pain Clinic - No    Diagnostics:  Reviewed (see chart)   Current Medications:  Reviewed (see chart)    Drug Allergies:  Reviewed (see chart)  ?   Latex Allergy:  No         Objective    Items left blank indicate that the test was inappropriate or not meaningful at the time of evaluation and therefore not performed.    Fall Risk Screening: Patient denies falls.      ROM/Strength:     Cerv Thor Lumbar Myotomes    L    R     L    R   Flexion    C4 Shoulder Elev.   L2 Hip Flexion 4+ 5   Extension    C5 Shoulder Abd.   L3 Knee Ext.      5 5   Left Lat. Flex    C6 Elbow Flexion   L4  "Ankle DF 5 5   Right Lat. Flex    C7 Elbow Ext.   L5 1st MTP Ext. 5 5   Left Rotation    C8 Finger Flex   S1 Ankle P.F. 5 5   Right Rotation    T1 Finger Abd.   S2 Knee Flexion 5 5          Hip Abduction            Ext. Rotation         Observation:  Patient uses arms to assist herself from sit to stand    Gait:  Slowed gait velocity. Decreased trunk rotation and arm swing.      Special Tests: Slump test negative bilaterally.       Today's Intervention:    Discussed PT plan of care to involve exercises, gait training and balance training.  Patient agrees with this plan.  Also discussed trial of a wheeled walker at some point during PT.   Instructed patient to bring her cane next session so we can ensure proper fit and use.     Supine PPT x 10, 5\" hold  Supine hooklying glut sets 10, 5\" hold       Home Exercise Program:    Supine PPT x 10, 5\" hold  Supine hooklying glut sets 10, 5\" hold       Assessment    Therapist Assessment / Clinical Impression:  Signs and symptoms consistent with impaired mobility with associated lumbar degenerative joint disease. The patient is appropriate for physical therapy to address their pain, functional limitations, and physical impairments.      Functional Impairment(s): See subjective on initial evaluation and Functional Assessment / Summary Report from [facility].    Physical Impairment(s):  impaired gait, impaired balance, functional weakness,       Patient Specific Functional and Pain Scales (PSFS):    Clinician Instructions: Complete after the history and before the exam.    Initial Assessment: We want to know what 3 activities in your life you are unable to perform, or are having the most difficulty performing, as a result of your chief problem. Please list and score at least 3 activities that you are unable to perform, or having the most difficulty performing, because of your chief problem.   Patient Specific Activity Scoring Scheme (score one number for each activity):   Activity " Score (0-10)  0= Unable to perform activity  10= Able to perform activity at same level as before injury or problem   1. Walking 1 block 4/10   2. Standing 15 minutes  5/10   3. Stairs  5/10   4.     5.     Totals:  14/30 = 46% ability which relates to 54% impairment    Patient verbally states that they understand that the information they have provided above is current and complete to the best of their knowledge.    Patient Specific Functional Scale Modifier Scale Conversion: (patient's modifier that correlates with pt's score on PSFS): 5-CK (50% Impaired).    G codes and Modifier taken from pt completing a PSFS: completed at initial evaluation   Initial Primary G Code and Modifier:    Per the Patient's intake and/or assessment the Primary G Code is: Mobility .   The Patient's Impairment, Limitation or Restriction Modifier would be best described as: CK - 40% - 60% Impairment.     Goal Primary G Code and Modifier:    The Patient's G Code Goal would be: Mobility    The Patient's Impairment, Limitation or Restriction Modifier goal would be best described as: CJ - 20% - 40% Impairment.         Goals:  Functional Short Term Goals (4 weeks):   Report 50% improvement in ability to walk one block  Report 50% improvement in ability to stand 15 minutes  Report 50% improvement in ability to perform stairs.   Report improved confidence in balance when walking       Functional Long Term Goals (8 weeks):   Develop independent HEP and management  Report 75% or greater improvement in ability to walk one block  Report 75% or greater improvement in ability to stand 15 minutes  Report 75% or greater improvement in ability to perform stairs.       Patient participated in goal selection and understand(s) the plan of care: Yes   Patient Potential for Achieving Desired Outcome: Good      Response to Intervention:  Good response to treatment.  Patient verbalized understanding of HEP.         Plan    Treatment Plan:       Frequency:  16 visits    Duration of Treatment: 8 weeks    Planned Interventions:    Home Exercise Program development  Therapeutic Exercise (ROM & Strengthening)  Therapeutic Activities  Neuromuscular Re-education  Manual Therapy  Gait Training  Hot Packs / Cold Pack Modalities  Vasopneumatic Compression with Cold Therapy ( Game Ready )      Plan for next visit:  Progress exercises as symptoms allow.  Gait and balance training.      Thank you for your referral to [facility].  Please call with any questions, concerns or comments.  [phone]    The signature, of the referring medical provider, on this document indicates certification of the above prescribed plan of care and is medically necessary.

## 2018-01-04 NOTE — INITIAL ASSESSMENTS
"Patient Information     Patient Name MRN Sex Mervat Morrow [id number] Female 1932      Initial Assessments by Santiago Yung, PT at 2017  1:11 PM     Author:  Santiago Yung, PT Service:  (none) Author Type:  PT- Physical Therapist     Filed:  2017  1:49 PM Date of Service:  2017  1:11 PM Status:  Signed     :  Santiago Yung PT (PT- Physical Therapist)            [facility]  Outpatient PT - Daily Note    Date of Service: 4/3/2017     Visit 1/10    Patient Name: Mervat Lockett   YOB: 1932   Referring MD/Provider: Dr. Beyer   Medical and Treatment Diagnosis: Impaired mobility  PT Treatment Diagnosis: impaired gait, impaired balance, functional weakness,   Insurance: Medicare   Start of Service: 17  Certification Dates: Start of Service: 17  Medicare/MA Re-Cert Due: 17           PT Orders: Has mobility symptoms suggestive of spinal stenosis/ would like help with developing exercise program and possible use of assistive device if it would help    Preferred name: Jose Martin      Subjective        Patient reports no difficulty with the current HEP.        Rates pain: no pain at rest.  Pain will increase to a 10/10 at times.   Describes pain: achy, dull  Locates pain: low back    Current functional limitations: walking is limited to half a block,  Standing limited to 15 minutes, household chores that involve bending are limited.     Prior Level:  Minimal to no difficulty completing the above functional activities.             Objective          Today's Intervention:    Discussed PT plan of care to involve exercises, gait training and balance training.  Patient agrees with this plan.  Also discussed trial of a wheeled walker at some point during PT.   Instructed patient to bring her cane next session so we can ensure proper fit and use.     Nustep x 6 minutes, seat 6, level 3    Supine PPT x 10, 5\" hold  Supine hooklying glut sets 10, 5\" " "hold     Added:  Supine PPT with bilateral arm raise x 10      Home Exercise Program:    Supine PPT x 10, 5\" hold  Supine hooklying glut sets 10, 5\" hold   Supine PPT with bilateral arm raise x 10      Assessment    Therapist Assessment / Clinical Impression:  Signs and symptoms consistent with impaired mobility with associated lumbar degenerative joint disease. The patient is appropriate for physical therapy to address their pain, functional limitations, and physical impairments.      Functional Impairment(s): See subjective on initial evaluation and Functional Assessment / Summary Report from [facility].    Physical Impairment(s):  impaired gait, impaired balance, functional weakness,       Patient Specific Functional and Pain Scales (PSFS):    Clinician Instructions: Complete after the history and before the exam.    Initial Assessment: We want to know what 3 activities in your life you are unable to perform, or are having the most difficulty performing, as a result of your chief problem. Please list and score at least 3 activities that you are unable to perform, or having the most difficulty performing, because of your chief problem.   Patient Specific Activity Scoring Scheme (score one number for each activity):   Activity Score (0-10)  0= Unable to perform activity  10= Able to perform activity at same level as before injury or problem   1. Walking 1 block 4/10   2. Standing 15 minutes  5/10   3. Stairs  5/10   4.     5.     Totals:  14/30 = 46% ability which relates to 54% impairment    Patient verbally states that they understand that the information they have provided above is current and complete to the best of their knowledge.    Patient Specific Functional Scale Modifier Scale Conversion: (patient's modifier that correlates with pt's score on PSFS): 5-CK (50% Impaired).    G codes and Modifier taken from pt completing a PSFS: completed at initial evaluation   Initial Primary G Code and Modifier:    Per the " Patient's intake and/or assessment the Primary G Code is: Mobility .   The Patient's Impairment, Limitation or Restriction Modifier would be best described as: CK - 40% - 60% Impairment.     Goal Primary G Code and Modifier:    The Patient's G Code Goal would be: Mobility    The Patient's Impairment, Limitation or Restriction Modifier goal would be best described as: CJ - 20% - 40% Impairment.         Goals:  Functional Short Term Goals (4 weeks):   Report 50% improvement in ability to walk one block  Report 50% improvement in ability to stand 15 minutes  Report 50% improvement in ability to perform stairs.   Report improved confidence in balance when walking       Functional Long Term Goals (8 weeks):   Develop independent HEP and management  Report 75% or greater improvement in ability to walk one block  Report 75% or greater improvement in ability to stand 15 minutes  Report 75% or greater improvement in ability to perform stairs.       Patient participated in goal selection and understand(s) the plan of care: Yes   Patient Potential for Achieving Desired Outcome: Good      Response to Intervention:  Good response to treatment.  Patient verbalized understanding of HEP.         Plan    Treatment Plan:      Frequency:  16 visits    Duration of Treatment: 8 weeks    Planned Interventions:    Home Exercise Program development  Therapeutic Exercise (ROM & Strengthening)  Therapeutic Activities  Neuromuscular Re-education  Manual Therapy  Gait Training  Hot Packs / Cold Pack Modalities  Vasopneumatic Compression with Cold Therapy ( Game Ready )      Plan for next visit:  Progress exercises as symptoms allow.  Gait and balance training.

## 2018-01-04 NOTE — PROGRESS NOTES
"Patient Information     Patient Name MRN Sex Mervat Morrow [id number] Female 1932      Progress Notes by Gerald Martínez DO at 2017  3:45 PM     Author:  Gerald Martínez DO Service:  (none) Author Type:  Physician     Filed:  2017  4:32 PM Encounter Date:  2017 Status:  Signed     :  Gerald Martínez DO (Physician)            PROGRESS NOTE    SUBJECTIVE:  Mervat Lockett is here for evaluation in regards to her right wrist. Patient tripped while working out in her garden on 17. She had pain in her wrist so she went to the emergency room and was noted to have a fracture. She was placed into a splint and sent for orthopedic follow-up and treatment. She has been getting by his best she can with this injury. Pain is tolerable. No previous fractures on this side but she did fracture the left in the past.    OBJECTIVE:  /70  Ht 1.6 m (5' 3\")  Wt 72.6 kg (160 lb)  BMI 28.34 kg/m2 Body mass index is 28.34 kg/(m^2).    General Appearance: Pleasant female in good appearance, mood and affect.  Alert and orientated times three ( time, date and location).    Skin: Intact, there is swelling noted.    Wrist:  positive palpable pain  Motion: Decreased due to fracture.    Shoulder:  Motion: full    Elbow:  Flexion: Abnormal  Extension: Abnormal    Hand:  Thenar wasting: no  Hypothenar wasting: no  Sensation: Normal  Radial and ulnar blood flow:  Normal    Eyes: Pupils are round.    Ears: Hearing: Impaired    Heart: She has good capillary refill into her hands.    Lungs: Clear.     Radiographic images from  where independently reviewed and discussed with the patient.     X-rays demonstrate an intra-articular fracture of the distal right radius with dorsal angulation, there is a small ulnar styloid fracture.    PROCEDURE: XR WRIST 3 VIEWS RIGHT  HISTORY: ARM PAIN/PROBLEM; FALL; .  COMPARISON: None.  TECHNIQUE: 3 views of the right wrist were " obtained.  FINDINGS: There is a comminuted fracture of the distal radius with intra-articular extension. There is slight impaction at the fracture site and apex anterior angulation. There is an osseous density adjacent to the ulnar styloid, potentially an additional nondisplaced fracture. Degenerative changes in the first carpometacarpal joint are noted. There is diffuse osteopenia.  IMPRESSION: Angulated distal radius fracture. Potential ulnar styloid fracture.  Electronically Signed By: Kristen Archer M.D. on 4/12/2017 3:25 PM    ASSESSMENT:     Intra-articular fracture of the distal right radius and a small ulnar styloid fracture (DOI 4/12/17).    PLAN:    I discussed conservative and surgical options with the patient at this time she and her  would like to proceed with surgical intervention.  Maintain the splint that is in place.  Continue to ice and elevate.  Their questions were answered to their satisfaction.  All of about 1 week after surgery.    I have discussed options with Mervat Lockett the treatment for a fractured wrist, which have included observation, and casting versus surgical repair. I discussed pros and cons of each approach, and at this point, Mervat Lockett would like to proceed with wrist surgery. We discussed that surgery would be an outpatient surgery, you would be able to go home following the surgery. We will plan on open reduction and internal fixation.  Surgical anesthesia would be general anesthesia versus block and anesthesia will discuss options.  I discussed following surgery Mervat Lockett would be in a splint until seen in the office and they can work on gentle motion of the fingers after surgery.  There may be a need for occupational therapy after surgery.   Full recovery from wrist surgery may take a year. The goal will be pain relief. Complications were discussed including continued pain, stiffness in the wrist, rare chance of neurovascular damage, potential chance  of infection. If deep infection were to occur, further surgery may be needed with repeat washout in the operating room with possible need for treatment with antibiotics.    Risks, benefits, conservative, surgical, and alternatives of treatment were thoroughly outlined. No guarantees were given. Risks which do include, but are not limited to:  Scar, infection, decreased motion, damage to blood vessels, nerves and tendons, failure or need for further treatment, reaction to medications and anesthesia, blood clots, and the possibility of death where discussed.  She did verbalize an understanding. All questions and concerns were addressed.    Patient is set up for open right wrist surgery.    Mervat Lockett will need pre op clearance for management of hypertension.     Follow up after surgery will be 7 days.    All questions where answered to the patients satisfaction.    Gerald Martínez D.O.  Orthopaedic Surgeon    [address] [address]  Phone [phone] (KNEE)  Fax [phone]    This document was created using computer generated templates and voice activated software.    4:27 PM 4/17/2017

## 2018-01-04 NOTE — PROGRESS NOTES
"Patient Information     Patient Name MRN Sex Mervat Morrow [id number] Female 1932      Progress Notes by Santiago Yung, PT at 4/10/2017  1:15 PM     Author:  Santiago Yung PT Service:  (none) Author Type:  PT- Physical Therapist     Filed:  4/10/2017  1:47 PM Date of Service:  4/10/2017  1:15 PM Status:  Signed     :  Santiago Yung PT (PT- Physical Therapist)            [facility]  Outpatient PT - Daily Note    Date of Service: 4/10/2017     Visit 2/10    Patient Name: Mervat Lockett   YOB: 1932   Referring MD/Provider: Dr. Beyer   Medical and Treatment Diagnosis: Impaired mobility  PT Treatment Diagnosis: impaired gait, impaired balance, functional weakness,   Insurance: Medicare   Start of Service: 17  Certification Dates: Start of Service: 17  Medicare/MA Re-Cert Due: 17           PT Orders: Has mobility symptoms suggestive of spinal stenosis/ would like help with developing exercise program and possible use of assistive device if it would help    Preferred name: Jose Martin      Subjective        Patient reports her legs are sore today      Rates pain: no pain at rest.  Pain will increase to a 10/10 at times.   Describes pain: achy, dull  Locates pain: low back    Current functional limitations: walking is limited to half a block,  Standing limited to 15 minutes, household chores that involve bending are limited.     Prior Level:  Minimal to no difficulty completing the above functional activities.             Objective          Today's Intervention:      Nustep x 10 minutes, seat 6, level 3    Supine PPT x 10, 5\" hold  Supine hooklying glut sets 10, 5\" hold   Supine PPT with bilateral arm raise x 10      Home Exercise Program:    Supine PPT x 10, 5\" hold  Supine hooklying glut sets 10, 5\" hold   Supine PPT with bilateral arm raise x 10      Assessment    Therapist Assessment / Clinical Impression:  Signs and symptoms consistent with " impaired mobility with associated lumbar degenerative joint disease. The patient is appropriate for physical therapy to address their pain, functional limitations, and physical impairments.      Functional Impairment(s): See subjective on initial evaluation and Functional Assessment / Summary Report from [facility].    Physical Impairment(s):  impaired gait, impaired balance, functional weakness,       Patient Specific Functional and Pain Scales (PSFS):    Clinician Instructions: Complete after the history and before the exam.    Initial Assessment: We want to know what 3 activities in your life you are unable to perform, or are having the most difficulty performing, as a result of your chief problem. Please list and score at least 3 activities that you are unable to perform, or having the most difficulty performing, because of your chief problem.   Patient Specific Activity Scoring Scheme (score one number for each activity):   Activity Score (0-10)  0= Unable to perform activity  10= Able to perform activity at same level as before injury or problem   1. Walking 1 block 4/10   2. Standing 15 minutes  5/10   3. Stairs  5/10   4.     5.     Totals:  14/30 = 46% ability which relates to 54% impairment    Patient verbally states that they understand that the information they have provided above is current and complete to the best of their knowledge.    Patient Specific Functional Scale Modifier Scale Conversion: (patient's modifier that correlates with pt's score on PSFS): 5-CK (50% Impaired).    G codes and Modifier taken from pt completing a PSFS: completed at initial evaluation   Initial Primary G Code and Modifier:    Per the Patient's intake and/or assessment the Primary G Code is: Mobility .   The Patient's Impairment, Limitation or Restriction Modifier would be best described as: CK - 40% - 60% Impairment.     Goal Primary G Code and Modifier:    The Patient's G Code Goal would be: Mobility    The Patient's  Impairment, Limitation or Restriction Modifier goal would be best described as: CJ - 20% - 40% Impairment.         Goals:  Functional Short Term Goals (4 weeks):   Report 50% improvement in ability to walk one block  Report 50% improvement in ability to stand 15 minutes  Report 50% improvement in ability to perform stairs.   Report improved confidence in balance when walking       Functional Long Term Goals (8 weeks):   Develop independent HEP and management  Report 75% or greater improvement in ability to walk one block  Report 75% or greater improvement in ability to stand 15 minutes  Report 75% or greater improvement in ability to perform stairs.       Patient participated in goal selection and understand(s) the plan of care: Yes   Patient Potential for Achieving Desired Outcome: Good      Response to Intervention:  Good response to treatment.  Patient verbalized understanding of HEP.         Plan    Treatment Plan:      Frequency:  16 visits    Duration of Treatment: 8 weeks    Planned Interventions:    Home Exercise Program development  Therapeutic Exercise (ROM & Strengthening)  Therapeutic Activities  Neuromuscular Re-education  Manual Therapy  Gait Training  Hot Packs / Cold Pack Modalities  Vasopneumatic Compression with Cold Therapy ( Game Ready )      Plan for next visit:  Progress exercises as symptoms allow.  Gait and balance training.

## 2018-01-04 NOTE — TELEPHONE ENCOUNTER
Patient Information     Patient Name MRN Mervat Leon [id number] Female 1932      Telephone Encounter by Kaley Dalton at 2017 10:45 AM     Author:  Kaley Dalton Service:  (none) Author Type:  (none)     Filed:  2017 10:47 AM Encounter Date:  2017 Status:  Signed     :  Kaley Dalton            Patient was seen in the ER and was told to follow up with  in three days. However he is booked on Monday so could you take a look at this and let me know what to do?

## 2018-01-04 NOTE — OR POSTOP
Patient Information     Patient Name MRN Sex Mervat Morrow [id number] Female 1932      OR PostOp by Florina De La Vega RN at 2017  2:25 PM     Author:  Florina De La Vega RN Service:  (none) Author Type:  NURS- Registered Nurse     Filed:  2017  2:27 PM Date of Service:  2017  2:25 PM Status:  Signed     :  Florina De La Vega RN (NURS- Registered Nurse)            PACU Transfer Note    Mervat Lockett transferred to DSU via cart.  Equipment used for transport:  None.  Accompanied by:  Registered Nurse. Report given to Estelita PEARSON RN.    Patient stable and meets phase 1 discharge criteria for transport from PACU.    PACU Respiratory Event Documentation     1) Episodes of Apnea greater than or equal to 10 seconds: no    2) Bradypnea - less than 8 breaths per minute: no    3) Pain score on 0 to 10 scale: tolerable    4) Pain-sedation mismatch (yes or no): yes    5) Repeated 02 desaturation less than 90% (yes or no): yes, was placed on O2 NC after pain med    Anesthesia notified? (yes or no): no      Any of the above events occuring repeatedly in separate 30 minute intervals may be considered recurrent PACU respiratory events.

## 2018-01-05 NOTE — TELEPHONE ENCOUNTER
Patient Information     Patient Name MRN Sex Mervat Morrow [id number] Female 1932      Telephone Encounter by Shazia Martínez RN at 5/15/2017  4:11 PM     Author:  Shazia Martínez RN Service:  (none) Author Type:  NURS- Registered Nurse     Filed:  5/15/2017  4:17 PM Encounter Date:  5/15/2017 Status:  Signed     :  Shazia Martínez RN (NURS- Registered Nurse)            Juan Antonio from [employer] calls stating Effexor 37.5 mg BID is not covered by insurance. 75 mg QD is covered. Wondering if patient could take 75 mg daily instead, otherwise will need a PA. Effexor 75 mg 1 tab daily lora'd up for approval if appropriate.    Shazia Martníez RN........5/15/2017 4:15 PM

## 2018-01-05 NOTE — PROGRESS NOTES
"Patient Information     Patient Name MRN Sex Mervat Morrow [id number] Female 1932      Progress Notes by Gerald Martínez DO at 2017 10:45 AM     Author:  Gerald Martínez DO Service:  (none) Author Type:  Physician     Filed:  2017 11:19 AM Encounter Date:  2017 Status:  Signed     :  Gerald Martínez DO (Physician)            PROGRESS NOTE    SUBJECTIVE:  Mervat Lockett is here for evaluation in regards to her right wrist. She is doing very well no complaints today.    OBJECTIVE:  /80  Pulse 74  Ht 1.6 m (5' 3\") There is no height or weight on file to calculate BMI.    General Appearance: Pleasant female in good appearance, mood and affect.  Alert and orientated times three ( time, date and location).    Skin: Incision site is healing well.    Wrist:  Very minimal palpable pain, and this is anticipated.  Motion: Decreased due to recent surgery and casting.    Shoulder:  Motion: full    Elbow:  Flexion: Normal  Extension: Normal  Deep tendon reflexes: Normal    Hand:  Sensation: Normal  Radial and ulnar blood flow:  Normal    Heart: regular rate and rhythm    Lungs: Clear.     Radiographic images from  where independently reviewed and discussed with the patient.      Xray:     X-rays demonstrate a healing fracture of the distal right radius. Hardware is in place. Minimal callous formation appreciated.    ASSESSMENT:    POSTOPERATIVE DIAGNOSIS   Comminuted intraarticular fracture of the distal right radius.      PROCEDURE   Open reduction and internal fixation of the distal right radius utilizing a Synthes stainless steel 2.4 mm VA-LCP 2-column right volar distal radius plate with 3 screws in the shaft and 6 in the distal portion of the plate with a T8 Star [employer] screwdriver (DOS 2017).    PLAN:    Patient was instructed on scar remodeling, and range of motion exercises.  She'll go into a carpal tunnel splint she can remove it to exercise and " shower and in 3 weeks she does not need to wear it anymore.  Follow-up on 4 weeks with x-ray first.  They know to call if there is any other problems.  Questions and concerns answered.    Gerald Martínez D.O.  Orthopaedic Surgeon    [facility]  [address] [place] [address]  Phone [phone] (KNEE)  Fax [phone]    This document was created using computer generated templates and voice activated software.    11:16 AM 5/26/2017

## 2018-01-05 NOTE — DISCHARGE SUMMARY
Patient Information     Patient Name MRN Sex Mervat Morrow [id number] Female 1932      Discharge Summaries by Santiago Yung PT at 2017  2:37 PM     Author:  Santiago Yung PT Service:  (none) Author Type:  PT- Physical Therapist     Filed:  2017  2:40 PM Date of Service:  2017  2:37 PM Status:  Signed     :  Santiago Yung PT (PT- Physical Therapist)            [facility]  Outpatient PT - Discharge Summary    Patient Name: Mervat Lockett   YOB: 1932   Referring MD/Provider: Dr. Beyer   Medical and Treatment Diagnosis: Impaired mobility  PT Treatment Diagnosis: impaired gait, impaired balance, functional weakness,   Insurance: Medicare   Start of Service: 17  Certification Dates: Start of Service: 17                     Medicare/MA Re-Cert Due: 17     Date of discharge: 2017     Dates of Service: 4/3/17    to  4/10/17  Number of visits: 2          Reason for Discharge:  Patient contacted PT indicated that she was injured in a fall at home and would be discharging from PT.     Progress from Initial to Discharge (if applicable):    Comments:  See progress note on 4/10/17 for status at the time of final visit.     Discharge G codes not completed due to an unplanned discharge.     Further Recommendations:    Patient will return to physician for follow-up.      Patient is discharged from Physical Therapy    Thank you for your referral to [facility].  Please call with any questions, concerns or comments.  [phone]

## 2018-01-05 NOTE — NURSING NOTE
Patient Information     Patient Name MRN Sex Mervat Morrow [id number] Female 1932      Nursing Note by Denisa Fisher at 2017 10:45 AM     Author:  Denisa Fisher Service:  (none) Author Type:  (none)     Filed:  2017 10:39 AM Encounter Date:  2017 Status:  Signed     :  Denisa Fisher            Pt presents for a follow up s/p right wrist orif, dos     Denisa Fisher CMA 2017 10:39 AM

## 2018-01-26 VITALS
SYSTOLIC BLOOD PRESSURE: 130 MMHG | WEIGHT: 160.8 LBS | TEMPERATURE: 97.9 F | TEMPERATURE: 97.8 F | HEART RATE: 94 BPM | SYSTOLIC BLOOD PRESSURE: 134 MMHG | HEIGHT: 63 IN | OXYGEN SATURATION: 97 % | SYSTOLIC BLOOD PRESSURE: 146 MMHG | DIASTOLIC BLOOD PRESSURE: 86 MMHG | HEIGHT: 63 IN | DIASTOLIC BLOOD PRESSURE: 88 MMHG | HEIGHT: 63 IN | BODY MASS INDEX: 28.49 KG/M2 | WEIGHT: 158 LBS | DIASTOLIC BLOOD PRESSURE: 70 MMHG | BODY MASS INDEX: 28 KG/M2

## 2018-01-26 VITALS
SYSTOLIC BLOOD PRESSURE: 140 MMHG | HEIGHT: 63 IN | DIASTOLIC BLOOD PRESSURE: 66 MMHG | HEART RATE: 74 BPM | DIASTOLIC BLOOD PRESSURE: 70 MMHG | WEIGHT: 160 LBS | SYSTOLIC BLOOD PRESSURE: 136 MMHG | SYSTOLIC BLOOD PRESSURE: 114 MMHG | HEIGHT: 63 IN | DIASTOLIC BLOOD PRESSURE: 80 MMHG | HEART RATE: 72 BPM | BODY MASS INDEX: 28.35 KG/M2

## 2018-01-26 VITALS
HEIGHT: 63 IN | DIASTOLIC BLOOD PRESSURE: 70 MMHG | SYSTOLIC BLOOD PRESSURE: 140 MMHG | BODY MASS INDEX: 28.42 KG/M2 | WEIGHT: 160 LBS | WEIGHT: 160.4 LBS | BODY MASS INDEX: 28.35 KG/M2 | SYSTOLIC BLOOD PRESSURE: 120 MMHG | HEIGHT: 63 IN | DIASTOLIC BLOOD PRESSURE: 72 MMHG | TEMPERATURE: 97.1 F

## 2018-01-26 VITALS
WEIGHT: 159.6 LBS | SYSTOLIC BLOOD PRESSURE: 122 MMHG | HEIGHT: 63 IN | DIASTOLIC BLOOD PRESSURE: 82 MMHG | HEART RATE: 108 BPM | BODY MASS INDEX: 28.53 KG/M2 | HEIGHT: 63 IN | DIASTOLIC BLOOD PRESSURE: 74 MMHG | BODY MASS INDEX: 28.28 KG/M2 | BODY MASS INDEX: 28.31 KG/M2 | SYSTOLIC BLOOD PRESSURE: 134 MMHG | HEIGHT: 63 IN | SYSTOLIC BLOOD PRESSURE: 130 MMHG | TEMPERATURE: 97.3 F | DIASTOLIC BLOOD PRESSURE: 84 MMHG | TEMPERATURE: 97.4 F | WEIGHT: 159.8 LBS | WEIGHT: 161 LBS

## 2018-01-26 VITALS
SYSTOLIC BLOOD PRESSURE: 142 MMHG | DIASTOLIC BLOOD PRESSURE: 80 MMHG | HEIGHT: 63 IN | WEIGHT: 159 LBS | BODY MASS INDEX: 28.17 KG/M2

## 2018-01-26 VITALS
WEIGHT: 159.6 LBS | DIASTOLIC BLOOD PRESSURE: 70 MMHG | BODY MASS INDEX: 28.28 KG/M2 | SYSTOLIC BLOOD PRESSURE: 118 MMHG | HEIGHT: 63 IN

## 2018-01-30 ASSESSMENT — PATIENT HEALTH QUESTIONNAIRE - PHQ9
SUM OF ALL RESPONSES TO PHQ QUESTIONS 1-9: 9
SUM OF ALL RESPONSES TO PHQ QUESTIONS 1-9: 13
SUM OF ALL RESPONSES TO PHQ QUESTIONS 1-9: 6
SUM OF ALL RESPONSES TO PHQ QUESTIONS 1-9: 13

## 2018-02-01 ENCOUNTER — HOSPITAL ENCOUNTER (OUTPATIENT)
Dept: RADIOLOGY | Facility: OTHER | Age: 83
End: 2018-02-01
Attending: NURSE PRACTITIONER

## 2018-02-01 ENCOUNTER — DOCUMENTATION ONLY (OUTPATIENT)
Dept: FAMILY MEDICINE | Facility: OTHER | Age: 83
End: 2018-02-01

## 2018-02-01 ENCOUNTER — OFFICE VISIT - GICH (OUTPATIENT)
Dept: FAMILY MEDICINE | Facility: OTHER | Age: 83
End: 2018-02-01

## 2018-02-01 ENCOUNTER — HISTORY (OUTPATIENT)
Dept: FAMILY MEDICINE | Facility: OTHER | Age: 83
End: 2018-02-01

## 2018-02-01 ENCOUNTER — COMMUNICATION - GICH (OUTPATIENT)
Dept: FAMILY MEDICINE | Facility: OTHER | Age: 83
End: 2018-02-01

## 2018-02-01 DIAGNOSIS — I10 ESSENTIAL (PRIMARY) HYPERTENSION: ICD-10-CM

## 2018-02-01 DIAGNOSIS — R50.9 FEVER: ICD-10-CM

## 2018-02-01 DIAGNOSIS — R11.0 NAUSEA: ICD-10-CM

## 2018-02-01 DIAGNOSIS — J10.1 INFLUENZA DUE TO OTHER IDENTIFIED INFLUENZA VIRUS WITH OTHER RESPIRATORY MANIFESTATIONS: ICD-10-CM

## 2018-02-01 DIAGNOSIS — R68.89 OTHER GENERAL SYMPTOMS AND SIGNS: ICD-10-CM

## 2018-02-01 DIAGNOSIS — D69.6 THROMBOCYTOPENIA (H): ICD-10-CM

## 2018-02-01 PROBLEM — E78.00 HYPERCHOLESTEROLEMIA: Status: ACTIVE | Noted: 2018-02-01

## 2018-02-01 PROBLEM — M48.061 SPINAL STENOSIS, LUMBAR: Status: ACTIVE | Noted: 2018-02-01

## 2018-02-01 PROBLEM — M89.9 DISORDER OF BONE AND CARTILAGE: Status: ACTIVE | Noted: 2018-02-01

## 2018-02-01 PROBLEM — F41.1 ANXIETY STATE: Status: ACTIVE | Noted: 2018-02-01

## 2018-02-01 PROBLEM — M94.9 DISORDER OF BONE AND CARTILAGE: Status: ACTIVE | Noted: 2018-02-01

## 2018-02-01 PROBLEM — F34.1 DYSTHYMIC DISORDER: Status: ACTIVE | Noted: 2018-02-01

## 2018-02-01 PROBLEM — R93.5 ABNORMAL CT OF THE ABDOMEN: Status: ACTIVE | Noted: 2017-08-22

## 2018-02-01 LAB
ABSOLUTE BASOPHILS - HISTORICAL: 0 THOU/CU MM
ABSOLUTE EOSINOPHILS - HISTORICAL: 0.1 THOU/CU MM
ABSOLUTE IMMATURE GRANULOCYTES(METAS,MYELOS,PROS) - HISTORICAL: 0 THOU/CU MM
ABSOLUTE LYMPHOCYTES - HISTORICAL: 0.4 THOU/CU MM (ref 0.9–2.9)
ABSOLUTE MONOCYTES - HISTORICAL: 0.4 THOU/CU MM
ABSOLUTE NEUTROPHILS - HISTORICAL: 6.6 THOU/CU MM (ref 1.7–7)
ANION GAP - HISTORICAL: 8 (ref 5–18)
BASOPHILS # BLD AUTO: 0.3 %
BUN SERPL-MCNC: 19 MG/DL (ref 7–25)
BUN/CREAT RATIO - HISTORICAL: 19
CALCIUM SERPL-MCNC: 9.7 MG/DL (ref 8.6–10.3)
CHLORIDE SERPLBLD-SCNC: 105 MMOL/L (ref 98–107)
CO2 SERPL-SCNC: 26 MMOL/L (ref 21–31)
CREAT SERPL-MCNC: 0.98 MG/DL (ref 0.7–1.3)
EOSINOPHIL NFR BLD AUTO: 0.8 %
ERYTHROCYTE [DISTWIDTH] IN BLOOD BY AUTOMATED COUNT: 16.5 % (ref 11.5–15.5)
GFR IF NOT AFRICAN AMERICAN - HISTORICAL: 54 ML/MIN/1.73M2
GLUCOSE SERPL-MCNC: 170 MG/DL (ref 70–105)
HCT VFR BLD AUTO: 36.8 % (ref 33–51)
HEMOGLOBIN: 11.3 G/DL (ref 12–16)
IMMATURE GRANULOCYTES(METAS,MYELOS,PROS) - HISTORICAL: 0.4 %
LYMPHOCYTES NFR BLD AUTO: 5.5 % (ref 20–44)
MCH RBC QN AUTO: 24.8 PG (ref 26–34)
MCHC RBC AUTO-ENTMCNC: 30.7 G/DL (ref 32–36)
MCV RBC AUTO: 81 FL (ref 80–100)
MONOCYTES NFR BLD AUTO: 5.4 %
NEUTROPHILS NFR BLD AUTO: 87.6 % (ref 42–72)
PLATELET # BLD AUTO: 137 THOU/CU MM (ref 140–440)
PMV BLD: 9.9 FL (ref 6.5–11)
POTASSIUM SERPL-SCNC: 4.6 MMOL/L (ref 3.5–5.1)
RED BLOOD COUNT - HISTORICAL: 4.56 MIL/CU MM (ref 4–5.2)
RSV RNA SPEC QL NAA+PROBE: NOT DETECTED
SODIUM SERPL-SCNC: 139 MMOL/L (ref 133–143)
TROPONIN I - HISTORICAL: <0.03 NG/ML
WHITE BLOOD COUNT - HISTORICAL: 7.6 THOU/CU MM (ref 4.5–11)

## 2018-02-09 VITALS
BODY MASS INDEX: 28.28 KG/M2 | HEIGHT: 63 IN | DIASTOLIC BLOOD PRESSURE: 84 MMHG | WEIGHT: 159.61 LBS | TEMPERATURE: 99.8 F | HEART RATE: 114 BPM | OXYGEN SATURATION: 94 % | SYSTOLIC BLOOD PRESSURE: 150 MMHG

## 2018-02-13 NOTE — PROGRESS NOTES
Patient Information     Patient Name MRN Sex Mervat Morrow [id number] Female 1932      Progress Notes by Jemma Montalvo NP at 2018  3:30 PM     Author:  Jemma Montalvo NP Service:  (none) Author Type:  PHYS- Nurse Practitioner     Filed:  2018  6:18 PM Encounter Date:  2018 Status:  Signed     :  Jemma Montalvo NP (PHYS- Nurse Practitioner)            SUBJECTIVE:    Mervat Lockett is a 86 y.o. female who presents for 24-hour onset of body aches, frequent coughing, fever 101, fatigue. Did have influenza vaccine in November. Reports  was ill with something last week but this resolved. Has not eaten any solid food today, liver taking fluids without difficulty. Denies any vomiting or diarrhea. Reports mild nausea. Reports chest feels like it's burning with frequent coughing, sore throat and mild headache.    HPI    Allergies     Allergen  Reactions     Paxil [Paroxetine] Anxiety   ,   Family History       Problem   Relation Age of Onset     Heart Disease  Father      CHF       Thyroid Disease  Daughter      Hypothyroid.       Blood Disease  Daughter      severe anemia with a hgb of less than 5       Good Health  Son      Good Health  Daughter      Thyroid Disease  Daughter      Hypothyroid.       Arthritis  Other      Father's side has rheumatoid arthritis     ,   Current Outpatient Prescriptions on File Prior to Visit       Medication  Sig Dispense Refill     esomeprazole (NEXIUM) 40 mg capsule Take 1 capsule by mouth once daily before a meal. 90 capsule 3     metoprolol tartrate (LOPRESSOR) 50 mg tablet Take 0.5 tablets by mouth 2 times daily. 90 tablet 3     mirtazapine (REMERON) 15 mg tablet Take 0.5-1 tablets by mouth at bedtime. 30 tablet 6     venlafaxine (EFFEXOR) 37.5 mg tablet 1/2 BID 90 tablet 3     No current facility-administered medications on file prior to visit.    ,   Current Outpatient Prescriptions:      esomeprazole (NEXIUM) 40 mg capsule, Take 1 capsule by  mouth once daily before a meal., Disp: 90 capsule, Rfl: 3     metoprolol tartrate (LOPRESSOR) 50 mg tablet, Take 0.5 tablets by mouth 2 times daily., Disp: 90 tablet, Rfl: 3     mirtazapine (REMERON) 15 mg tablet, Take 0.5-1 tablets by mouth at bedtime., Disp: 30 tablet, Rfl: 6     ondansetron (ZOFRAN ODT) 4 mg disintegrating tablet, Place 2 tablets on the tongue one time for 1 dose., Disp: 2 tablet, Rfl: 0     oseltamivir (TAMIFLU) 75 mg capsule, Take 1 capsule by mouth 2 times daily for 5 days., Disp: 10 capsule, Rfl: 0     venlafaxine (EFFEXOR) 37.5 mg tablet, 1/2 BID, Disp: 90 tablet, Rfl: 3  Medications have been reviewed by me and are current to the best of my knowledge and ability.,   Past Medical History:     Diagnosis  Date     Closed fracture of right radius and ulna 04/13/2017     Hot flashes     Occasional hot flashes      Hx of being hospitalized     Hospitalization for tachycardia and PVCs      Hx of pregnancy     G5, P4-0-1-4      Menopause     with mild symptoms      Osteoarthritis of knee 11/6/2014     Plantar wart      S/P ORIF right distal radius 4/21/2017   ,   Patient Active Problem List       Diagnosis  Date Noted     Abnormal CT of the abdomen  08/22/2017     DEPRESSION/ANXIETY       B12 DEFICIENCY  03/09/2012     IRON DEFICIENCY  03/09/2012     HYPERTENSION  11/18/2010     HYPERCHOLESTEROLEMIA       ANXIETY, CHRONIC       Chronic anxiety          SPINAL STENOSIS, LUMBAR       Degenerative lumbar disc disease with lumbar spinal stenosis          OSTEOPENIA     ,   Past Surgical History:      Procedure  Laterality Date     BLEPHAROPLASTY  4/18/06    by Dr. White, left brow lift.        CATARACT REMOVAL  2010    Bilateral cataract extraction - Dr. White       CERVICAL POLYPECTOMY      Endocervical polyps       CYSTOCELE REPAIR      Uterine prolapse and vaginal vault prolapse with cystocele       HIP ARTHROPLASTY Right      HIP REPLACEMENT  3/9/09    Left, by Ashish Ivory M.D.       HIP  "SURGERY Left 2012    repair left hip / [facility]        S/P ORIF OF RIGHT DISTAL RADIUS Right 04/21/2017    Synthes stainless steel variable angle volar radial plate with T8 screw heads.       SALPINGO-OOPHORECTOMY  4/16/98    Bilateral salpingo oophorectomy and vaginal vault suspension       TOTAL ABDOMINAL HYSTERECTOMY      Rectocele      and   Social History       Substance Use Topics         Smoking status:   Former Smoker     Smokeless tobacco:   Never Used     Alcohol use   1.2 oz/week     1 Glasses of wine, 1 Standard drinks or equivalent per week        Comment: 1 a night        REVIEW OF SYSTEMS:  Review of Systems   Constitutional: Positive for chills, fever and malaise/fatigue.   HENT: Positive for congestion.    Eyes: Negative.    Respiratory: Positive for cough.    Cardiovascular: Negative.    Gastrointestinal: Negative.    Genitourinary: Negative.    Musculoskeletal: Positive for myalgias.   Skin: Negative.    Neurological: Positive for dizziness.   Endo/Heme/Allergies: Negative.    Psychiatric/Behavioral: Negative.        OBJECTIVE:  /84 (Cuff Site: Right Arm, Position: Sitting, Cuff Size: Adult Regular)  Pulse (!) 114  Temp 99.8  F (37.7  C) (Oral)  Ht 1.6 m (5' 2.99\")  Wt 72.4 kg (159 lb 9.8 oz)  SpO2 94%  BMI 28.28 kg/m2    EXAM:   Physical Exam   Constitutional: She is oriented to person, place, and time and well-developed, well-nourished, and in no distress. No distress.   Nontoxic, very verbally interactive and social   HENT:   Head: Normocephalic and atraumatic.   Mouth/Throat: Oropharynx is clear and moist.   Neck: Normal range of motion. Neck supple.   Cardiovascular: Normal rate, regular rhythm, normal heart sounds and intact distal pulses.  Exam reveals no gallop and no friction rub.    No murmur heard.  Pulmonary/Chest: Effort normal and breath sounds normal.   Musculoskeletal: Normal range of motion.   Lymphadenopathy:     She has no cervical adenopathy.   Neurological: She is " alert and oriented to person, place, and time. Gait normal.   Skin: Skin is warm and dry. She is not diaphoretic.   Psychiatric: Mood, memory, affect and judgment normal.   Nursing note and vitals reviewed.      ASSESSMENT/PLAN:    ICD-10-CM    1. Fever, unspecified fever cause  R50.9 CBC WITH DIFFERENTIAL      XR CHEST 2 VIEWS PA AND LATERAL      CBC WITH DIFFERENTIAL      CBC WITH AUTO DIFFERENTIAL   2. Flu-like symptoms R68.89 [facility] ONLY INFLUENZA A/B PCR      [facility] ONLY INFLUENZA A/B PCR      RSV PCR GI ONLY      URINALYSIS W REFLEX MICROSCOPIC IF POSITIVE      TROPONIN I      BASIC METABOLIC PANEL      CBC WITH DIFFERENTIAL      XR CHEST 2 VIEWS PA AND LATERAL      TROPONIN I      BASIC METABOLIC PANEL      CBC WITH DIFFERENTIAL      CBC WITH AUTO DIFFERENTIAL      RSV PCR [facility] ONLY   3. Nausea R11.0 TROPONIN I      ondansetron (ZOFRAN ODT) 4 mg disintegrating tablet      TROPONIN I   4. HYPERTENSION I10    5. Influenza A J10.1 oseltamivir (TAMIFLU) 75 mg capsule   6. Thrombocytopenia (HC) D69.6       Results for orders placed or performed in visit on 02/01/18      [facility] ONLY INFLUENZA A/B PCR      Result  Value Ref Range    INFLUENZA  A  PCR Positive (A)      INFLUENZA B PCR Negative     TROPONIN I      Result  Value Ref Range    TROPONIN I <0.030 <0.034 ng/mL   BASIC METABOLIC PANEL      Result  Value Ref Range    SODIUM 139 133 - 143 mmol/L    POTASSIUM 4.6 3.5 - 5.1 mmol/L    CHLORIDE 105 98 - 107 mmol/L    CO2,TOTAL 26 21 - 31 mmol/L    ANION GAP 8 5 - 18                    GLUCOSE 170 (H) 70 - 105 mg/dL    CALCIUM 9.7 8.6 - 10.3 mg/dL    BUN 19 7 - 25 mg/dL    CREATININE 0.98 0.70 - 1.30 mg/dL    BUN/CREAT RATIO           19                    GFR if African American >60 >60 ml/min/1.73m2    GFR if not African American 54 (L) >60 ml/min/1.73m2   CBC WITH AUTO DIFFERENTIAL      Result  Value Ref Range    WHITE BLOOD COUNT         7.6 4.5 - 11.0 thou/cu mm    RED BLOOD COUNT           4.56 4.00 - 5.20  mil/cu mm    HEMOGLOBIN                11.3 (L) 12.0 - 16.0 g/dL    HEMATOCRIT                36.8 33.0 - 51.0 %    MCV                       81 80 - 100 fL    MCH                       24.8 (L) 26.0 - 34.0 pg    MCHC                      30.7 (L) 32.0 - 36.0 g/dL    RDW                       16.5 (H) 11.5 - 15.5 %    PLATELET COUNT            137 (L) 140 - 440 thou/cu mm    MPV                       9.9 6.5 - 11.0 fL    NEUTROPHILS               87.6 (H) 42.0 - 72.0 %    LYMPHOCYTES               5.5 (L) 20.0 - 44.0 %    MONOCYTES                 5.4 <12.0 %    EOSINOPHILS               0.8 <8.0 %    BASOPHILS                 0.3 <3.0 %    IMMATURE GRANULOCYTES(METAS,MYELOS,PROS) 0.4 %    ABSOLUTE NEUTROPHILS      6.6 1.7 - 7.0 thou/cu mm    ABSOLUTE LYMPHOCYTES      0.4 (L) 0.9 - 2.9 thou/cu mm    ABSOLUTE MONOCYTES        0.4 <0.9 thou/cu mm    ABSOLUTE EOSINOPHILS      0.1 <0.5 thou/cu mm    ABSOLUTE BASOPHILS        0.0 <0.3 thou/cu mm    ABSOLUTE IMMATURE GRANULOCYTES(METAS,MYELOS,PROS) 0.0 <=0.3 thou/cu mm   RSV PCR [facility] ONLY      Result  Value Ref Range    Respiratory Syncytial Virus NOT Detected NOT Detected     took 2 cups of apple juice during office visit--- tolerated well without vomiting    Reviewed confirmatory labs positive influenza A--- mild thrombocytopenia 137 probably viral response    per CDC and up-to-date Tamiflu recommended over 65 years old high-risk populations    Plan:  Discussed possible adverse effects of Tamiflu and patient and  wish to proceed      Discussed pushing fluids frequently to maintain hydration, Tylenol as needed for fever or body ache    Activity as tolerated only    Discussed prevention of transmission    Discussed expected course and red flag symptoms--such as difficulty breathing, weakness, inability to take oral fluids, lethargy should call 911 or go to ED immediately    Recommend following up in clinic in a few weeks for CBC platelet recheck      Patient and   both agreeable and comfortable with above plan and will follow-up as recommended                                      PROCEDURE:  XR CHEST 2 VIEWS PA AND LATERAL     HISTORY: Flu-like symptoms.     COMPARISON:  06/27/2017     FINDINGS:  The cardiomediastinal contours are stable.  The trachea is midline.  No focal consolidation, effusion or pneumothorax.    The bones are osteopenic.     IMPRESSION:       No acute cardiopulmonary process.       Electronically Signed By: Jorge Sebastian on 2/1/2018 3:55 PM

## 2018-02-13 NOTE — NURSING NOTE
Patient Information     Patient Name MRN Mervat Leon [id number] Female 1932      Nursing Note by Leanne Quiñonez at 2018  3:30 PM     Author:  Leanne Quiñonez Service:  (none) Author Type:  (none)     Filed:  2018  3:00 PM Encounter Date:  2018 Status:  Signed     :  Leanne Quiñonez            Patient presents to clinic today for flu like illness starting yesterday.    Leanne Quiñonez LPN...................2018  2:47 PM

## 2018-02-13 NOTE — PATIENT INSTRUCTIONS
Patient Information     Patient Name MRN Mervat Leon [id number] Female 1932      Patient Instructions by Jemma Montalvo NP at 2018  4:15 PM     Author:  Jemma Montalvo NP  Service:  (none) Author Type:  PHYS- Nurse Practitioner     Filed:  2018  4:15 PM  Encounter Date:  2018 Status:  Addendum     :  Jemma Montalvo NP (PHYS- Nurse Practitioner)        Related Notes: Original Note by Jemma Montalvo NP (PHYS- Nurse Practitioner) filed at 2018  4:15 PM               Index French All languages Related topics   Flu (Influenza)   ________________________________________________________________________  KEY POINTS    Flu is caused by a virus. It can be spread by coughing or sneezing, or by touching something with the virus on it.    The flu vaccine is the best way to help prevent the flu. Washing your hands often is also important. Treatment includes rest, drinking plenty of liquids, and sometimes medicine for pain or fever.    If you have another medical problem and get the flu, it s best to see your healthcare provider.  ________________________________________________________________________  What is flu?  Influenza, also called the flu, is an infection caused by a virus. The flu affects your whole body, especially your air passages, and causes symptoms that are similar to cold symptoms. Flu symptoms tend to be worse than cold symptoms, but it can sometimes be hard to tell the difference between the flu and a cold unless you get a test.  Infection with the flu virus sometimes leads to other infections such as ear, sinus, and chest infections. Pneumonia can also occur as a result of the flu. It can be caused by the flu virus itself or by bacteria infecting lung tissues that have been damaged by the virus. Older adults; people whose immune systems are weak; and people with chronic medical problems, such as heart or lung disease or diabetes, are at risk for more severe  symptoms or problems. This is why it s important to try to prevent flu by getting flu shots every year.  What is the cause?  Flu is caused by a virus. When you have the flu, the virus is in your mucus and saliva and can spread to others when you cough or sneeze. People can also get the flu if they touch something with the flu virus on it (like cups, doorknobs, and hands) and then touch their mouth, nose, or eyes.  Outbreaks of flu occur every year, usually in late fall and winter.  What are the symptoms?  Flu tends to start suddenly. You may feel fine one hour and feel sick the next. Flu symptoms may be different from person to person. Some of the common symptoms include:    Chills, sweating, and fever    Muscle or body aches    Tiredness    Runny or stuffy nose    Headache    Sore throat    Cough  Flu symptoms usually last 3 to 7 days. You may start feeling better after the first 2 days or so.  How is it diagnosed?  Your healthcare provider will ask about your symptoms and examine you. The diagnosis is usually based on your symptoms. There are lab tests for flu, but in most cases there is no need to do a test, especially when many others in your community are sick with the flu.  How is it treated?  Usually you can treat your symptoms at home.    Get plenty of rest.    Drink a lot of clear liquids. Water, broth, juice, electrolyte solutions, and noncaffeinated drinks are best. When you have a high fever, your body needs more liquid because you lose more water in your breath and from your skin. Having enough fluids also helps the mucus in your sinuses and lungs stay thin and easy to clear from the body. When the mucus is thin, it is less likely to cause a sinus or chest infection.    Consider taking acetaminophen or ibuprofen to relieve headaches and muscle aches and to lower a fever. Read the label and take as directed. Unless recommended by your healthcare provider, you should not take these medicines for more than  10 days.    Nonsteroidal anti-inflammatory medicines (NSAIDs), such as ibuprofen, naproxen, and aspirin, may cause stomach bleeding and other problems. These risks increase with age.    Acetaminophen may cause liver damage or other problems. Unless recommended by your provider, don't take more than 3000 milligrams (mg) in 24 hours. To make sure you don t take too much, check other medicines you take to see if they also contain acetaminophen. Ask your provider if you need to avoid drinking alcohol while taking this medicine.    If your nose or sinuses get congested, a decongestant medicine may help you feel better. Taking a decongestant may help prevent ear or sinus infections.    Cough medicine or cough drops may temporarily help control a cough.  Antiviral medicine is medicine your healthcare provider can prescribe that may make flu symptoms less severe. It may also help you feel better a little sooner. The medicine can be taken as a tablet or nasal spray. It helps only if you start taking it within the first 2 days of illness. Usually it is taken for only a few days. Even if you are taking antiviral medicine, you can pass the flu virus to other people. It is still important to wash your hands often and cover your mouth and nose when you cough or sneeze.  Your healthcare provider may prescribe antiviral medicine if you aren t sick yet but have been exposed to the flu and have not had the flu vaccine.  Talk to your healthcare provider right away if you have symptoms of the flu and:    You have heart disease, asthma, chronic bronchitis, kidney disease, diabetes, or another chronic medical problem    Your immune system does not work normally such as because you are taking steroid medicine for a medical problem    Your symptoms get worse, you have a painful cough, you are coughing up mucus, or you are having trouble breathing. These symptoms can be signs of pneumonia.  Ask your healthcare provider:    How and when you  will get your test results    How long it will take to recover from this illness    If there are activities you should avoid, and when you can return to your normal activities    How to take care of yourself at home    What symptoms or problems you should watch for and what to do if you have them  Make sure you know when you should come back for a checkup. Keep all appointments for provider visits or tests.  How can I help prevent flu?  The flu vaccine is the best way to help prevent the flu. If you do get the flu, the vaccine may help keep you from getting really sick. The flu vaccine is recommended for adults and children 6 months and older. It s especially important for those with a chronic illness.  The flu vaccine is given as a shot in the arm. The nasal spray is not recommended for the 5783-8950 flu season because it has not prevented the disease for the last 3 years.  The shot contains killed virus and is safe for everyone age 6 months and older.  You should get a new flu shot every year because the vaccine wears off over time and because it is changed each year to protect against the current year s most likely flu strains. It s best to get the new vaccine as soon as it s available each year before the start of flu season. However, if the vaccine is still available, it can be helpful to get it anytime during the flu season. Flu season usually starts in October and can last through May.  Flu seasons can vary from region to region. If you are at high risk for infection and plan to travel to an area where you might be exposed to the flu, make sure you have an up-to-date flu shot before you go on your trip.  Other things you can do to help avoid getting the flu are:    Wash your hands often with soap and water. Wash for 20 seconds (long enough to sing the whole  Happy Birthday  song) or use an alcohol-based hand .    Avoid touching your eyes, nose, or mouth when you are out in public.    Stay at least 6  feet away from people who are sick, if you can.    Try to take good care of yourself: Get plenty of sleep, be physically active, manage your stress, drink plenty of fluids, and eat healthy food. Stop smoking.    Keep surfaces clean--especially bedside tables, surfaces in the bathroom, and toys for children. Some viruses and bacteria can live 2 hours or more on surfaces like cafeteria tables, doorknobs, and desks. Wipe them down with a household disinfectant according to directions on the label.  If you are sick, you can help protect others if you:    Don t go to work or school. Avoid contact with other people except to get medical care.    Cover your nose and mouth with a tissue when you cough or sneeze. Throw the tissue in the trash after you use it, and then wash your hands. If you don t have a tissue, cough or sneeze into your upper sleeve instead of your hands.    Clean your hands often with soap and water or an alcohol-based hand , especially after using tissues or coughing or sneezing into your hands.  Developed by [employer].  Adult Advisor 2017.2 published by [employer].  Last modified: 2016-08-30  Last reviewed: 2016-08-30  This content is reviewed periodically and is subject to change as new health information becomes available. The information is intended to inform and educate and is not a replacement for medical evaluation, advice, diagnosis or treatment by a healthcare professional.  References   Adult Advisor 2017.2 Index    Copyright   2017 [employer], a division of [employer] All rights reserved.

## 2018-02-13 NOTE — TELEPHONE ENCOUNTER
Patient Information     Patient Name MRN Mervat Leon [id number] Female 1932      Telephone Encounter by Hieu Newsome MD at 2018 12:59 PM     Author:  Hieu Newsome MD Service:  (none) Author Type:  Physician     Filed:  2018 12:59 PM Encounter Date:  2018 Status:  Signed     :  Hieu Newsome MD (Physician)            Patient would need to be seen

## 2018-02-13 NOTE — TELEPHONE ENCOUNTER
"Patient Information     Patient Name MRMervat Fowler [id number] Female 1932      Telephone Encounter by Liat Gutierrez RN at 2018 11:43 AM     Author:  Liat Gutierrez RN Service:  (none) Author Type:  NURS- Registered Nurse     Filed:  2018 12:02 PM Encounter Date:  2018 Status:  Signed     :  Liat Gutierrez RN (NURS- Registered Nurse)            In clinical absence of patient's primary, Daniel Beyer MD, patient is requesting that this message be sent to the Doc of the Day for consideration please.     ,  Pt is requesting an RX for Tamiflu.  Pt is complaining of muscle aches, fever of 101, and a cough that kept her up all last night.  Pt states symptoms started some time yesterday and today she states she feels lousy.  Pt denies an respiratory symptoms or high risk diagnosis.  Could we send an RX in for pt or would you like pt to be seen?  Please advise.    Please send script to [facility] is appropriate.    Reason for Disposition    Patient is HIGH RISK (e.g., age > 64 years, pregnant, HIV+, or chronic medical condition)    Answer Assessment - Initial Assessment Questions  1. WORST SYMPTOM: \"What is your worst symptom?\" (e.g., cough, runny nose, muscle aches, headache, sore throat, fever)       \"Ache all over and a cough that has kept me awake all last night.\"  2. ONSET: \"When did your flu symptoms start?\"       Sometime yesterday  3. COUGH: \"How bad is the cough?\"        Unproductive and keeps pt awake  4. RESPIRATORY DISTRESS: \"Describe your breathing.\"       \"It seems to be breathing ok\"  5. FEVER: \"Do you have a fever?\" If so, ask: \"What is your temperature, how was it measured, and when did it start?\"      101   6. EXPOSURE: \"Were you exposed to someone with influenza?\"        Unknown  7. FLU VACCINE: \"Did you get a flu shot this year?\"      yes  8. HIGH RISK DISEASE: \"Do you any major health problems?\" (e.g., heart or lung disease, asthma, weak immune " "system, or other HIGH RISK conditions)      No  9. PREGNANCY: \"Is there any chance you are pregnant?\" \"When was your last menstrual period?\"      NA  10. OTHER SYMPTOMS: \"Do you have any other symptoms?\"  (e.g., runny nose, muscle aches, headache, sore throat)        Muscle aches and feels lousy all over and coughing frequently    Protocols used: ADULT INFLUENZA - SEASONAL-A-AH            "

## 2018-02-13 NOTE — TELEPHONE ENCOUNTER
Patient Information     Patient Name MRN Mervat Leon [id number] Female 1932      Telephone Encounter by Liat Gutierrez RN at 2018  2:11 PM     Author:  Liat Gutierrez RN Service:  (none) Author Type:  NURS- Registered Nurse     Filed:  2018  2:20 PM Encounter Date:  2018 Status:  Signed     :  Liat Gutierrez RN (NURS- Registered Nurse)            Pt notified of provider note and transferred to scheduling.  Future appointment noted for today with Jemma Gutierrez RN ....................  2018   2:14 PM

## 2018-02-20 DIAGNOSIS — M25.531 RIGHT WRIST PAIN: Primary | ICD-10-CM

## 2018-02-23 ENCOUNTER — HOSPITAL ENCOUNTER (OUTPATIENT)
Dept: GENERAL RADIOLOGY | Facility: OTHER | Age: 83
Discharge: HOME | End: 2018-02-23
Attending: ORTHOPAEDIC SURGERY | Admitting: ORTHOPAEDIC SURGERY
Payer: MEDICARE

## 2018-02-23 ENCOUNTER — OFFICE VISIT (OUTPATIENT)
Dept: ORTHOPEDICS | Facility: OTHER | Age: 83
End: 2018-02-23
Attending: ORTHOPAEDIC SURGERY
Payer: MEDICARE

## 2018-02-23 VITALS
WEIGHT: 160 LBS | DIASTOLIC BLOOD PRESSURE: 70 MMHG | HEART RATE: 76 BPM | SYSTOLIC BLOOD PRESSURE: 130 MMHG | BODY MASS INDEX: 28.35 KG/M2 | HEIGHT: 63 IN

## 2018-02-23 DIAGNOSIS — G56.03 BILATERAL CARPAL TUNNEL SYNDROME: Primary | ICD-10-CM

## 2018-02-23 DIAGNOSIS — M25.531 RIGHT WRIST PAIN: ICD-10-CM

## 2018-02-23 PROCEDURE — 73110 X-RAY EXAM OF WRIST: CPT | Mod: RT

## 2018-02-23 PROCEDURE — G0463 HOSPITAL OUTPT CLINIC VISIT: HCPCS

## 2018-02-23 PROCEDURE — 99213 OFFICE O/P EST LOW 20 MIN: CPT | Performed by: ORTHOPAEDIC SURGERY

## 2018-02-23 ASSESSMENT — PAIN SCALES - GENERAL: PAINLEVEL: MILD PAIN (2)

## 2018-02-23 NOTE — PROGRESS NOTES
"PROGRESS NOTE    SUBJECTIVE:  Mervat Lockett is here for evaluation in regards to her right wrist pain which upon further questioning is actually tingling into both of her hands.  She states has been getting a little bit worse does bother her at night and can wake her up.  She had suffered a fracture about the distal radius and had ORIF done back in 4/21/17.  This is increasing in its numbness so she was concerned therefore she came in with her  to have this evaluated.  No previous treatment.    OBJECTIVE:  /70  Pulse 76  Ht 1.6 m (5' 3\")  Wt 72.6 kg (160 lb)  BMI 28.34 kg/m2 Body mass index is 28.34 kg/(m^2).    General Appearance: Pleasant female in good appearance, mood and affect.  Alert and orientated times three ( time, date and location).    Skin: Incision site healed well.    Wrist:  No palpable tenderness.  Motion: Improved range of motion since she's been utilizing it more.  No thenar or hyperthenar atrophy.  Positive Phalen's test bilateral right greater than left and there is a very minimally positive Tinel's on the right.  Compression test is positive bilaterally.    Shoulder:  Motion: full    Elbow:  Flexion: Normal  Extension: Normal  Deep tendon reflexes: Normal    Hand:  Sensation: Normal  Radial and ulnar blood flow:  Normal    Heart: regular rate and rhythm    Lungs: Clear.     Radiographic images from 5/26, 6/23, 2/23/18 where independently reviewed and discussed with the patient.      Xray:     The fractures of healed up but she does have an ulnar styloid small avulsion that never did heal.    PROCEDURE: XR WRIST RT G/E 3 VW    HISTORY: ; Right wrist pain    COMPARISON: 6/23/2017    TECHNIQUE: 4 views of the right wrist were obtained.    FINDINGS: There has been ORIF of a distal radius fracture. The  fracture has undergone interval healing. The surgical hardware is  intact. On one of the views, there is lucency associated with one of  the distal screws, which can indicate " loosening. Old ulnar styloid  fracture is unchanged. 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     IMPRESSION: ORIF of a prior distal radius fracture. No acute fracture.  Lucency around one of the distal screws could indicate loosening of  the surgical hardware.     TREE ZIMMERMAN MD    PROCEDURE: XR WRIST W NAVICULAR MINIMUM 3 VIEWS RIGHT  HISTORY: S/P ORIF (open reduction internal fixation) fracture.  COMPARISON: 04/12/2017  TECHNIQUE: 4 views right wrist.  FINDINGS: Postoperative changes of interval open reduction internal fixation of the right distal radius is seen. Previously seen dorsal angulation is resolved. The fracture lines are obscured by healing endosteal bone. A tiny ulnar styloid avulsion fracture fragment persists. There is widening of the scapholunate interval suggesting ligamentous rupture. Mild arthritic changes are present, most pronounced at the first CMC joint.  IMPRESSION: Expected healing of a distal right radius fracture following ORIF.  Electronically Signed By: Jorge Sebastian on 5/26/2017 12:56 PM    ASSESSMENT:    POSTOPERATIVE DIAGNOSIS   Comminuted intraarticular fracture of the distal right radius.      PROCEDURE   Open reduction and internal fixation of the distal right radius utilizing a Synthes stainless steel 2.4 mm VA-LCP 2-column right volar distal radius plate with 3 screws in the shaft and 6 in the distal portion of the plate with a T8 Star Drive screwdriver (DOS 04/21/2017).    PLAN:    She will continue to work on range of motion exercises and strengthening.  I did give her 2 carpal tunnel splints to be worn at night.  I will have her see Dr. Simpson for EMGs of bilateral upper extremities to make sure there is no cervical involvement right does seem to be worse than left.  Follow-up after above.  They know to call if there is any other problems.  Questions and concerns answered.  Handout given in regards to EMG and carpal tunnel syndrome these were  reviewed with her and her  .    Gerald Martínez D.O.  Orthopaedic Surgeon    [facility]  [address] [place] [address]  Phone [phone] (KNEE)  Fax [phone]    Disclaimer:  This note consists of words and symbols derived from keyboarding, dictation, or using voice recognition software. As a result, there may be errors in the script that have gone undetected. Please consider this when interpreting information found in this note.    12:13 PM 2/23/2018

## 2018-02-23 NOTE — MR AVS SNAPSHOT
After Visit Summary   2/23/2018    Mervat Lockett    MRN: [id number]           Patient Information     Date Of Birth          1/2/1932        Visit Information        Provider Department      2/23/2018 10:30 AM Gerald Martínez DO [facility]        Today's Diagnoses     Bilateral carpal tunnel syndrome    -  1       Follow-ups after your visit        Follow-up notes from your care team     Return in about 2 weeks (around 3/9/2018).      Future tests that were ordered for you today     Open Future Orders        Priority Expected Expires Ordered    EMG ASAP  2/23/2019 2/23/2018            Who to contact     If you have questions or need follow up information about today's clinic visit or your schedule please contact [facility] directly at [phone].  Normal or non-critical lab and imaging results will be communicated to you by MyChart, letter or phone within 4 business days after the clinic has received the results. If you do not hear from us within 7 days, please contact the clinic through [employer] or phone. If you have a critical or abnormal lab result, we will notify you by phone as soon as possible.  Submit refill requests through [employer] or call your pharmacy and they will forward the refill request to us. Please allow 3 business days for your refill to be completed.          Additional Information About Your Visit        MyChart Information     [employer] gives you secure access to your electronic health record. If you see a primary care provider, you can also send messages to your care team and make appointments. If you have questions, please call your primary care clinic.  If you do not have a primary care provider, please call [phone] and they will assist you.        Care EveryWhere ID     This is your Care EveryWhere ID. This could be used by other organizations to access your [place] medical records  [id number]        Your Vitals Were      "Pulse Height BMI (Body Mass Index)             76 1.6 m (5' 3\") 28.34 kg/m2          Blood Pressure from Last 3 Encounters:   02/23/18 130/70   02/01/18 150/84   11/27/17 130/82    Weight from Last 3 Encounters:   02/23/18 72.6 kg (160 lb)   02/01/18 72.4 kg (159 lb 9.8 oz)   11/27/17 72.4 kg (159 lb 9.6 oz)               Primary Care Provider Office Phone # Fax #    Daniel Beyer [phone] [address] [facility] [address]        Equal Access to Services     [facility] : Hadii valarie Villagran, waluis felipe hook, evelina kaalmada sidney, coby kay. So [facility] [phone].    ATENCIÓN: Si habla español, tiene a echeverria disposición servicios gratuitos de asistencia lingüística. Llame al [phone].    We comply with applicable federal civil rights laws and [place] laws. We do not discriminate on the basis of race, color, national origin, age, disability, sex, sexual orientation, or gender identity.            Thank you!     Thank you for choosing [facility] AND [facility]  for your care. Our goal is always to provide you with excellent care. Hearing back from our patients is one way we can continue to improve our services. Please take a few minutes to complete the written survey that you may receive in the mail after your visit with us. Thank you!             Your Updated Medication List - Protect others around you: Learn how to safely use, store and throw away your medicines at www.disposemymeds.org.          This list is accurate as of 2/23/18 12:18 PM.  Always use your most recent med list.                   Brand Name Dispense Instructions for use Diagnosis    * Esomeprazole Magnesium 40 MG Pack           * esomeprazole 40 MG CR capsule    nexIUM     Take 40 mg by mouth        metoprolol succinate 50 MG 24 hr tablet    TOPROL-XL          mirtazapine 15 MG tablet    REMERON          venlafaxine 37.5 MG 24 hr capsule    EFFEXOR-XR "          * Notice:  This list has 2 medication(s) that are the same as other medications prescribed for you. Read the directions carefully, and ask your doctor or other care provider to review them with you.

## 2018-03-14 ENCOUNTER — MEDICAL CORRESPONDENCE (OUTPATIENT)
Dept: HEALTH INFORMATION MANAGEMENT | Facility: OTHER | Age: 83
End: 2018-03-14

## 2018-03-14 DIAGNOSIS — Z96.649 S/P REVISION OF TOTAL HIP: Primary | ICD-10-CM

## 2018-03-28 ENCOUNTER — HOSPITAL ENCOUNTER (OUTPATIENT)
Dept: PHYSICAL THERAPY | Facility: OTHER | Age: 83
Setting detail: THERAPIES SERIES
End: 2018-03-28
Attending: NURSE PRACTITIONER
Payer: MEDICARE

## 2018-03-28 PROCEDURE — 97161 PT EVAL LOW COMPLEX 20 MIN: CPT | Mod: GP,KX

## 2018-03-28 PROCEDURE — G8978 MOBILITY CURRENT STATUS: HCPCS | Mod: GP,CJ,KX

## 2018-03-28 PROCEDURE — G8979 MOBILITY GOAL STATUS: HCPCS | Mod: GP,CI,KX

## 2018-03-28 PROCEDURE — 40000185 ZZHC STATISTIC PT OUTPT VISIT

## 2018-03-28 PROCEDURE — 97110 THERAPEUTIC EXERCISES: CPT | Mod: GP,KX

## 2018-03-28 NOTE — PROGRESS NOTES
[facility]          OUTPATIENT PHYSICAL THERAPY ORTHOPEDIC EVALUATION  PLAN OF TREATMENT FOR OUTPATIENT REHABILITATION  (COMPLETE FOR INITIAL CLAIMS ONLY)  Patient's Last Name, First Name, M.I.  YOB: 1932  Mervat Lockett    Provider s Name:  [facility]   Medical Record No.  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      PT Diagnosis:  Impaired mobility, decreased strength, decreased endurance, impaired balance   Visits from SOC:  1      _________________________________________________________________________________  Plan of Treatment/Functional Goals:  balance training, gait training, joint mobilization, manual therapy, neuromuscular re-education, ROM, strengthening, stretching     Cryotherapy, Electrical stimulation, Hot packs, TENS, Ultrasound     Goals  Goal Identifier: HEP  Goal Description: Patient will demonstrate independence and compliance with her HEP in order to improve her overall strength and endurance  Target Date: 04/25/18    Goal Identifier: Stairs  Goal Description: Patient will be able to ascend/descend 1 flight of stairs with minimal to no pain in order to improve her overall function.   Target Date: 05/23/18    Goal Identifier: Dressing  Goal Description: Patient will demonstrate ability to complete all LE dressing with minimal to no pain in order to improve her efficiency with ADL's.   Target Date: 04/25/18    Goal Identifier: Mobility  Goal Description: Patient will be able to ambulate for longer than 10 minutes with minimal to no pain in order to improve safety and efficiency with community ambulation  Target Date: 05/23/18     Therapy Frequency:  2 times/Week  Predicted Duration of Therapy Intervention:  8 weeks    Brayden Oneil PT                 I CERTIFY THE NEED FOR THESE SERVICES FURNISHED UNDER        THIS  PLAN OF TREATMENT AND WHILE UNDER MY CARE .             Physician Signature               Date    X_____________________________________________________                      Certification Date From:  03/28/18   Certification Date To:  05/23/18    Referring Provider:  Barbi Perez    Initial Assessment        See Epic Evaluation Start of Care Date: 03/28/18

## 2018-03-28 NOTE — PROGRESS NOTES
03/28/18 1300   General Information   Type of Visit Initial OP Ortho PT Evaluation   Start of Care Date 03/28/18   Referring Physician Barbi Perez   Patient/Family Goals Statement Patient would like to reduce her pain and improve her strength   Orders Evaluate and Treat   Orders Comment S/P Revision of Left total hip.  Deconditioning. Strengthening and stretching. Balance training. Gait Eval. HEP. 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 Her left hip was revised a few years ago. Her main concern arose about 3 weeks ago when she was walking up stairs at [facility]. She developed pain in her left thigh and hip. She went to the [facility] where her surgery was done to get her hip checked out. She had xrays done and said that everything looked fine. Her pain has improved significantly since three weeks ago. She reports that she isn't quite back to normal and has a slight pain left in her left hip. Denies any numbness and tingling in her LE's. Denies any falls in the past year. Does state that she has been very sedentary and is not very active. Patient states she is unsure about how much therapy she needs/wants to come to.        Present Yes   Body Part(s)   Body Part(s) Hip   Presentation and Etiology   Pertinent history of current problem (include personal factors and/or comorbidities that impact the POC) 2 hip surgeries on left and 1 on right hip   Impairments A. Pain;E. Decreased flexibility;D. Decreased ROM;F. Decreased strength and endurance;G. Impaired balance;H. Impaired gait   Functional Limitations perform activities of daily living;perform desired leisure / sports activities   Symptom Location left  lateral thigh   How/Where did it occur With repetition/overuse   Onset date of current episode/exacerbation 03/14/18   Chronicity New   Pain rating (0-10 point scale) Best (/10);Worst (/10)   Best (/10) 0   Worst (/10) 8   Pain quality B. Dull;C. Aching   Frequency of pain/symptoms C. With activity   Pain/symptoms are: Worse during the day   Pain/symptoms exacerbated by B. Walking   Pain/symptoms eased by C. Rest   Progression of symptoms since onset: Improved   Prior Level of Function   Prior Level of Function-Mobility Independent   Prior Level of Function-ADLs Independent   Current Level of Function   Patient role/employment history F. Retired   Living environment House/townhome   Current equipment-Gait/Locomotion None   Current equipment-ADL None   Fall Risk Screen   Fall screen completed by PT   Have you fallen 2 or more times in the past year? No   Have you fallen and had an injury in the past year? No   Is patient a fall risk? No   Hip Objective Findings   Side (if bilateral, select both right and left) Left   Observation Slower moving and cautious with activities during evaluation   Integumentary  No significant findings   Posture Rounded shoulders   Gait/Locomotion Slower gait speed, wider base of support   Hip ROM Comments Hamstring: min limited on left   Lumbar ROM All WFL   Hip/Knee Strength Comments Ankle DF: 5/5   Neurological Testing Comments Slump: negative   Palpation Mild discomfort on IT band on left side and gluteal muscles   Left Hip Flexion PROM WFL - not tested but patient able to complete all functional activities during evaluation today   Left Hip Abduction PROM WFL   Left Hip Flexion Strength 4/5   Left Hip Abduction Strength 4/5   Left Knee Flexion Strength 5/5   Left Knee Extension Strength 5/5   Left Hamstring Flexibility Min limited on left   Left Piriformis Flexibility Min limited on left   Planned Therapy Interventions   Planned Therapy Interventions balance training;gait  training;joint mobilization;manual therapy;neuromuscular re-education;ROM;strengthening;stretching   Planned Modality Interventions   Planned Modality Interventions Cryotherapy;Electrical stimulation;Hot packs;TENS;Ultrasound   Clinical Impression   Criteria for Skilled Therapeutic Interventions Met yes, treatment indicated   PT Diagnosis Impaired mobility, decreased strength, decreased endurance, impaired balance   Influenced by the following impairments pain, weakness, stiffness   Functional limitations due to impairments Ambulation for prolonged duration, stairs, dressing LE's.    Clinical Presentation Stable/Uncomplicated   Clinical Presentation Rationale Minimal comorbidities effecting plan of care, 1 treatment areas, PSFS   Clinical Decision Making (Complexity) Low complexity   Therapy Frequency 2 times/Week   Predicted Duration of Therapy Intervention (days/wks) 8 weeks   Risk & Benefits of therapy have been explained Yes   Patient, Family & other staff in agreement with plan of care Yes   Clinical Impression Comments Patient is a 86 year old female referred to physical therapy with left hip pain. Patient developed more discomfort and pain with increased activitiy such as stair climbing and prolonged ambulation. This has improved. She is demonstrating impaired strength and endurance. She would benefit from physical therapy services in order to reduce her pain and improve her overall mobility, strength, and endurance.    ORTHO GOALS   PT Ortho Eval Goals 1;2;3;4   Ortho Goal 1   Goal Identifier HEP   Goal Description Patient will demonstrate independence and compliance with her HEP in order to improve her overall strength and endurance   Target Date 04/25/18   Ortho Goal 2   Goal Identifier Stairs   Goal Description Patient will be able to ascend/descend 1 flight of stairs with minimal to no pain in order to improve her overall function.    Target Date 05/23/18   Ortho Goal 3   Goal Identifier Dressing   Goal  Description Patient will demonstrate ability to complete all LE dressing with minimal to no pain in order to improve her efficiency with ADL's.    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

## 2018-04-02 ENCOUNTER — OFFICE VISIT (OUTPATIENT)
Dept: ORTHOPEDICS | Facility: OTHER | Age: 83
End: 2018-04-02
Attending: ORTHOPAEDIC SURGERY
Payer: COMMERCIAL

## 2018-04-02 VITALS
HEART RATE: 88 BPM | BODY MASS INDEX: 28.35 KG/M2 | SYSTOLIC BLOOD PRESSURE: 138 MMHG | HEIGHT: 63 IN | WEIGHT: 160 LBS | DIASTOLIC BLOOD PRESSURE: 64 MMHG

## 2018-04-02 DIAGNOSIS — G56.03 BILATERAL CARPAL TUNNEL SYNDROME: Primary | ICD-10-CM

## 2018-04-02 PROCEDURE — 99214 OFFICE O/P EST MOD 30 MIN: CPT | Mod: 57 | Performed by: ORTHOPAEDIC SURGERY

## 2018-04-02 PROCEDURE — G0463 HOSPITAL OUTPT CLINIC VISIT: HCPCS

## 2018-04-02 ASSESSMENT — PAIN SCALES - GENERAL: PAINLEVEL: NO PAIN (0)

## 2018-04-02 NOTE — PROGRESS NOTES
"PROGRESS NOTE    SUBJECTIVE:  Mervat Lockett is here for evaluation in regards to her right and left hand numbness.  She did make it through her EMG and did okay with that.  She also has noted that with her carpal tunnel splints that has helped but she still has having a hard time hanging onto things and she is dropping things.  She stays very active in hopes to build to do gardening this summer.    OBJECTIVE:  /64  Pulse 88  Ht 1.6 m (5' 3\")  Wt 72.6 kg (160 lb)  BMI 28.34 kg/m2 Body mass index is 28.34 kg/(m^2).    General Appearance: Pleasant 86 year old female in good appearance, mood and affect.  Alert and orientated times three ( time, date and location).    Skin: well healed incision site.    Wrist:  No palpable tenderness.  Motion: Improved range of motion since she's been using her wrists more.  No thenar or hyperthenar atrophy.  Positive Phalen's test bilateral right greater than left and there is a very minimally positive Tinel's on the right.  Compression test is positive bilaterally.    Shoulder:  Motion: full    Elbow:  Flexion: Normal  Extension: Normal  Deep tendon reflexes: Normal    Hand:  Sensation: Normal  Radial and ulnar blood flow:  Normal    Heart: regular rate and rhythm    Lungs: Clear.     Radiographic images from 5/26, 6/23, 2/23/18 where independently reviewed and discussed with the patient.      Xray:     The fractures of healed up but she does have an ulnar styloid small avulsion that never did heal.    PROCEDURE: XR WRIST RT G/E 3 VW    HISTORY: ; Right wrist pain    COMPARISON: 6/23/2017    TECHNIQUE: 4 views of the right wrist were obtained.    FINDINGS: There has been ORIF of a distal radius fracture. The  fracture has undergone interval healing. The surgical hardware is  intact. On one of the views, there is lucency associated with one of  the distal screws, which can indicate loosening. Old ulnar styloid  fracture is unchanged. There is degenerative disease in the " wrist.     IMPRESSION: ORIF of a prior distal radius fracture. No acute fracture.  Lucency around one of the distal screws could indicate loosening of  the surgical hardware.     TREE ZIMMERMAN MD    PROCEDURE: XR WRIST W NAVICULAR MINIMUM 3 VIEWS RIGHT  HISTORY: S/P ORIF (open reduction internal fixation) fracture.  COMPARISON: 04/12/2017  TECHNIQUE: 4 views right wrist.  FINDINGS: Postoperative changes of interval open reduction internal fixation of the right distal radius is seen. Previously seen dorsal angulation is resolved. The fracture lines are obscured by healing endosteal bone. A tiny ulnar styloid avulsion fracture fragment persists. There is widening of the scapholunate interval suggesting ligamentous rupture. Mild arthritic changes are present, most pronounced at the first CMC joint.  IMPRESSION: Expected healing of a distal right radius fracture following ORIF.  Electronically Signed By: Jorge Sebastian on 5/26/2017 12:56 PM    EMG:    EMG does show she has bilateral carpal tunnel syndrome right is worse than her left.  She is nonreactive on the right with a 2.5 delay time on the left median nerve her delay time on the motor 5.6 on the right 4.0 on the left.  Please see Dr. Persaud notes for full details    ASSESSMENT:    Bilateral carpal tunnel syndrome right greater than left.    POSTOPERATIVE DIAGNOSIS   Comminuted intraarticular fracture of the distal right radius.      PROCEDURE   Open reduction and internal fixation of the distal right radius utilizing a Synthes stainless steel 2.4 mm VA-LCP 2-column right volar distal radius plate with 3 screws in the shaft and 6 in the distal portion of the plate with a T8 Star Drive screwdriver (DOS 04/21/2017).    PLAN:    I discussed conservative and surgical options with her at this time she is considering right carpal tunnel release.  She will continue to utilize her carpal tunnel splints.  Follow-up after above.  They know to call if there is any other  problems.  Questions and concerns answered.  Handout was once again given in regards to carpal tunnel syndrome these were  reviewed with her and her .    I have discussed options with Mervat Lockett for the treatment of carpal tunnel, which included observation, physical therapy, corticosteroid injection versus surgical release. I discussed pros and cons of each approach, and at this point, Mervat Lockett would like to proceed with carpal tunnel surgery. We discussed that surgery would be an outpatient surgery, you would be able to go home following the surgery. We will plan on open release of the transverse carpal ligament with anything else that needs to be done.  Surgical anesthesia would be general anesthesia versus a block and anesthesia will discuss options.  I discussed following surgery Mervat Lockett would be in a splint until seen in the office and they can work on gentle motion of the elbow and fingers after surgery.  At four weeks following surgery occupational therapy may be needed.   Full recovery from carpal tunnel surgery may take a year. The goal will be pain relief. Complications were discussed including continued pain, stiffness in the wrist, rare chance of neurovascular damage, potential chance of infection. If deep infection were to occur, further surgery may be needed with repeat washout in the operating room with possible need for treatment with antibiotics.    Risks, benefits, conservative, surgical, and alternatives of treatment were thoroughly outlined. No guarantees were given. Risks which do include, but are not limited to:  Scar, infection, decreased motion, damage to blood vessels, nerves and tendons, failure or need for further treatment, reaction to medications and anesthesia, blood clots, and the possibility of death where discussed.  She did verbalize an understanding. All questions and concerns were addressed.    Patient is set up for open right carpal tunnel  surgery.    Mervat Lockett will need pre op clearance for management of hypertension, pulmonary and cardiac evaluation for planned procedure.    Follow up after surgery will be 14 days.    All questions where answered to the patients satisfaction.    Gerald Martínez D.O.  Orthopaedic Surgeon    [facility] and [address] [address]  Phone [phone] (KNEE)  Fax [phone]    Disclaimer:  This note consists of words and symbols derived from keyboarding, dictation, or using voice recognition software. As a result, there may be errors in the script that have gone undetected. Please consider this when interpreting information found in this note.    11:56 AM 4/2/2018

## 2018-04-02 NOTE — MR AVS SNAPSHOT
After Visit Summary   4/2/2018    Mervat Lockett    MRN: [id number]           Patient Information     Date Of Birth          1/2/1932        Visit Information        Provider Department      4/2/2018 11:30 AM Gerald Martínez DO [facility]        Today's Diagnoses     Bilateral carpal tunnel syndrome    -  1       Follow-ups after your visit        Follow-up notes from your care team     Return in about 2 weeks (around 4/16/2018).      Your next 10 appointments already scheduled     Apr 02, 2018  1:00 PM CDT   Treatment with Brayden Peratalo, PT   [facility] ([facility])    [address] [facility] [address]   [phone]            Apr 06, 2018  1:00 PM CDT   Treatment with Brayden Peratalo, PT   [facility] ([facility])    [address] [facility] [address]   [phone]              Who to contact     If you have questions or need follow up information about today's clinic visit or your schedule please contact [facility] directly at [phone].  Normal or non-critical lab and imaging results will be communicated to you by Streakhart, letter or phone within 4 business days after the clinic has received the results. If you do not hear from us within 7 days, please contact the clinic through Streakhart or phone. If you have a critical or abnormal lab result, we will notify you by phone as soon as possible.  Submit refill requests through CerRx or call your pharmacy and they will forward the refill request to us. Please allow 3 business days for your refill to be completed.          Additional Information About Your Visit        MyChart Information     CerRx gives you secure access to your electronic health record. If you see a primary care provider, you can also send messages to your care team and make appointments. If you have questions, please call  "your primary care clinic.  If you do not have a primary care provider, please call [phone] and they will assist you.        Care EveryWhere ID     This is your Care EveryWhere ID. This could be used by other organizations to access your [place] medical records  [id number]        Your Vitals Were     Pulse Height BMI (Body Mass Index)             88 1.6 m (5' 3\") 28.34 kg/m2          Blood Pressure from Last 3 Encounters:   04/02/18 138/64   02/23/18 130/70   02/01/18 150/84    Weight from Last 3 Encounters:   04/02/18 72.6 kg (160 lb)   02/23/18 72.6 kg (160 lb)   02/01/18 72.4 kg (159 lb 9.8 oz)              Today, you had the following     No orders found for display       Primary Care Provider Office Phone # Fax #    Daniel Beyer [phone] [address] [facility] [address]        Equal Access to Services     [facility]: Hadii aad ku hadasho Soomaali, waaxda luqadaha, qaybta kaalmada adeegyada, waxay yolandain haymeccan jennifer feliciano . So [facility] [phone].    ATENCIÓN: Si habla español, tiene a echeverria disposición servicios gratuitos de asistencia lingüística. LlThe [facility] [phone].    We comply with applicable federal civil rights laws and [place] laws. We do not discriminate on the basis of race, color, national origin, age, disability, sex, sexual orientation, or gender identity.            Thank you!     Thank you for choosing [facility] [place] [facility]  for your care. Our goal is always to provide you with excellent care. Hearing back from our patients is one way we can continue to improve our services. Please take a few minutes to complete the written survey that you may receive in the mail after your visit with us. Thank you!             Your Updated Medication List - Protect others around you: Learn how to safely use, store and throw away your medicines at www.disposemymeds.org.          This list is accurate as of 4/2/18 12:00 PM.  Always use your " most recent med list.                   Brand Name Dispense Instructions for use Diagnosis    * Esomeprazole Magnesium 40 MG Pack           * esomeprazole 40 MG CR capsule    nexIUM     Take 40 mg by mouth        metoprolol succinate 50 MG 24 hr tablet    TOPROL-XL          mirtazapine 15 MG tablet    REMERON          venlafaxine 37.5 MG 24 hr capsule    EFFEXOR-XR          * Notice:  This list has 2 medication(s) that are the same as other medications prescribed for you. Read the directions carefully, and ask your doctor or other care provider to review them with you.

## 2018-04-04 ENCOUNTER — HOSPITAL ENCOUNTER (OUTPATIENT)
Dept: PHYSICAL THERAPY | Facility: OTHER | Age: 83
Setting detail: THERAPIES SERIES
End: 2018-04-04
Attending: NURSE PRACTITIONER
Payer: MEDICARE

## 2018-04-04 PROCEDURE — 97110 THERAPEUTIC EXERCISES: CPT | Mod: GP

## 2018-04-04 PROCEDURE — 40000185 ZZHC STATISTIC PT OUTPT VISIT

## 2018-04-06 ENCOUNTER — HOSPITAL ENCOUNTER (OUTPATIENT)
Dept: PHYSICAL THERAPY | Facility: OTHER | Age: 83
Setting detail: THERAPIES SERIES
End: 2018-04-06
Attending: NURSE PRACTITIONER
Payer: MEDICARE

## 2018-04-06 PROCEDURE — 40000185 ZZHC STATISTIC PT OUTPT VISIT

## 2018-04-06 PROCEDURE — 97110 THERAPEUTIC EXERCISES: CPT | Mod: GP

## 2018-04-09 ENCOUNTER — OFFICE VISIT (OUTPATIENT)
Dept: FAMILY MEDICINE | Facility: OTHER | Age: 83
End: 2018-04-09
Attending: FAMILY MEDICINE
Payer: COMMERCIAL

## 2018-04-09 VITALS
TEMPERATURE: 97.1 F | BODY MASS INDEX: 29.09 KG/M2 | HEIGHT: 63 IN | OXYGEN SATURATION: 95 % | WEIGHT: 164.2 LBS | SYSTOLIC BLOOD PRESSURE: 122 MMHG | DIASTOLIC BLOOD PRESSURE: 76 MMHG | HEART RATE: 76 BPM

## 2018-04-09 DIAGNOSIS — G56.01 CARPAL TUNNEL SYNDROME OF RIGHT WRIST: Primary | ICD-10-CM

## 2018-04-09 PROCEDURE — 99214 OFFICE O/P EST MOD 30 MIN: CPT | Performed by: FAMILY MEDICINE

## 2018-04-09 PROCEDURE — G0463 HOSPITAL OUTPT CLINIC VISIT: HCPCS

## 2018-04-09 ASSESSMENT — PAIN SCALES - GENERAL: PAINLEVEL: NO PAIN (0)

## 2018-04-09 NOTE — NURSING NOTE
".Date of Surgery: 04/25/18  Type of Surgery: right carpal tunnel  Surgeon: Dr. Martínez  [facility]:  [facility]  Fax:     Fever/Chills or other infectious symptoms in past month: no  >10lb weight loss in past two months: no  O2 SAT: 95    Health Care Directive/Code status:  yes  Hx of blood transfusions:   no   Td up to date:  yes  History of VRE/MRSA:  no Date:    Preoperative Evaluation: Obstructive Sleep Apnea screening    S: Snore -  Do you snore loudly? (louder than talking or loud enough to be heard through closed doors)no  T: Tired - Do you often feel tired, fatigued, or sleepy during the daytime?yes  O: Observed - Has anyone ever observed you stop breathing during your sleep?no  P: Pressure - Do you have or are you being treated for high blood pressure?yes  B: BMI - BMI greater than 35kg/m2?yes  A: Age - Age over 50 years old?yes  N: Neck - Neck circumference greater than 40 cm?no  G: Gender - Gender: Male?no    Total number of \"YES\" responses:  4    Scoring: Low risk of MEGHAN 0-2  At Risk of MEGHAN: >3 High Risk of MEGHAN: 5-8    "

## 2018-04-09 NOTE — MR AVS SNAPSHOT
After Visit Summary   4/9/2018    Mervat Lockett    MRN: [id number]           Patient Information     Date Of Birth          1/2/1932        Visit Information        Provider Department      4/9/2018 10:45 AM Daniel Beyer MD [facility]        Today's Diagnoses     Carpal tunnel syndrome of right wrist    -  1       Follow-ups after your visit        Your next 10 appointments already scheduled     Apr 12, 2018  2:30 PM CDT   Treatment with Brayden Oneil, PT   [facility] ([facility])    [address]  [place] [address]   [phone]            Apr 25, 2018   Procedure with Gerald Martínez DO   [facility] ([facility])    [address]  [place] [address]   [phone]            May 11, 2018 10:15 AM CDT   Return Visit with Gerald Martínez DO   [facility] ([facility])    [address]  [place] [address]   [phone]              Who to contact     If you have questions or need follow up information about today's clinic visit or your schedule please contact [facility] directly at [phone].  Normal or non-critical lab and imaging results will be communicated to you by Veezeonhart, letter or phone within 4 business days after the clinic has received the results. If you do not hear from us within 7 days, please contact the clinic through Veezeonhart or phone. If you have a critical or abnormal lab result, we will notify you by phone as soon as possible.  Submit refill requests through [employer] or call your pharmacy and they will forward the refill request to us. Please allow 3 business days for your refill to be completed.          Additional Information About Your Visit        VeezeonharKustomNote Information     [employer] gives you secure access to your electronic health record. If you see a primary  "care provider, you can also send messages to your care team and make appointments. If you have questions, please call your primary care clinic.  If you do not have a primary care provider, please call [phone] and they will assist you.        Care EveryWhere ID     This is your Care EveryWhere ID. This could be used by other organizations to access your [place] medical records  [id number]        Your Vitals Were     Pulse Temperature Height Pulse Oximetry BMI (Body Mass Index)       76 97.1  F (36.2  C) (Temporal) 5' 2.5\" (1.588 m) 95% 29.55 kg/m2        Blood Pressure from Last 3 Encounters:   04/09/18 122/76   04/02/18 138/64   02/23/18 130/70    Weight from Last 3 Encounters:   04/09/18 164 lb 3.2 oz (74.5 kg)   04/02/18 160 lb (72.6 kg)   02/23/18 160 lb (72.6 kg)              Today, you had the following     No orders found for display       Primary Care Provider Office Phone # Fax #    Daniel Beyer [phone]       [address] [facility] [address]        Equal Access to Services     [facility]: Hadii valarie mazariegoso Sochaiali, waaxda luqadaha, qaybta kaalmada adeegyada, coby kay. So [facility] [phone].    ATENCIÓN: Si habla español, tiene a echeverria disposición servicios gratuitos de asistencia lingüística. Llame al [phone].    We comply with applicable federal civil rights laws and [place] laws. We do not discriminate on the basis of race, color, national origin, age, disability, sex, sexual orientation, or gender identity.            Thank you!     Thank you for choosing [facility]  for your care. Our goal is always to provide you with excellent care. Hearing back from our patients is one way we can continue to improve our services. Please take a few minutes to complete the written survey that you may receive in the mail after your visit with us. Thank you!             Your Updated Medication List - Protect " others around you: Learn how to safely use, store and throw away your medicines at www.disposemymeds.org.          This list is accurate as of 4/9/18 11:26 AM.  Always use your most recent med list.                   Brand Name Dispense Instructions for use Diagnosis    * Esomeprazole Magnesium 40 MG Pack           * esomeprazole 40 MG CR capsule    nexIUM     Take 40 mg by mouth        metoprolol succinate 50 MG 24 hr tablet    TOPROL-XL          mirtazapine 15 MG tablet    REMERON          venlafaxine 37.5 MG 24 hr capsule    EFFEXOR-XR          * Notice:  This list has 2 medication(s) that are the same as other medications prescribed for you. Read the directions carefully, and ask your doctor or other care provider to review them with you.

## 2018-04-09 NOTE — PROGRESS NOTES
----------------- PREOPERATIVE EXAM ------------------  4/9/2018    SUBJECTIVE:  Mervat Lockett is a 86 year old female here for preop.    I was asked to see Mervat Lockett by Dr. Martínez for a preoperative history and physical.      Date of Surgery: 4/25/18  Type of Surgery: R carpal tunnel release  Surgeon: [facility]:  [facility]    HPI:  Pt has had sx of R CTS for some time now. She cannot quantitate how long this has been a problem. She does not feel that she has loss of strength.  Patient has no personal nor FH of life threatening anesthesia complications. Patient denies unusual bleeding tendencies. No recent history of cough, fever,cold, sweats, chills.         Allergies:  Allergies   Allergen Reactions     Paroxetine Anxiety       Latex allergy  No    Td up to date:  2012        ROS:    surgical:  patient denies previous complications from prior surgeries including but not limited to prolonged bleeding, anesthesia complications, dysrhythmias, surgical wound infections, or prolonged hospital stay.    Hx of blood transfusions:  NO      -------------------------------------------------------------    PHYSICAL EXAM:      PHYSICAL EXAMINATION  EXAM:   HEENT; Clear  Heart NSR wo Murmur  Lungs Clear  Abd; Normal  Lympatics- no noted nodes      ASSESSMENT/PLAN:  R. CTS      LABS:   CBC and BMP looked satisfactory in Feb      EKG:  Not indicated  ---------------------------------------------------------------    ASSESSEMNT AND PLAN:  1.  Preoperative history and physical   consults:  none    For above listed surgery and anesthesia:     - Patient is low  risk for perioperative complications.      PRE OP RECOMMENDATIONS:  Discontinue ASA 5 days prior to reduce bleeding risk and Discontinue NSAIDS 5 days prior to procedure to reduce bleeding risk        FLEX LEAL MD..................4/9/2018 11:07 AM

## 2018-04-10 ENCOUNTER — HOSPITAL ENCOUNTER (OUTPATIENT)
Dept: PHYSICAL THERAPY | Facility: OTHER | Age: 83
Setting detail: THERAPIES SERIES
End: 2018-04-10
Attending: NURSE PRACTITIONER
Payer: MEDICARE

## 2018-04-10 PROCEDURE — 40000185 ZZHC STATISTIC PT OUTPT VISIT

## 2018-04-10 PROCEDURE — 97110 THERAPEUTIC EXERCISES: CPT | Mod: GP

## 2018-04-17 RX ORDER — CEFAZOLIN SODIUM 1 G/3ML
1 INJECTION, POWDER, FOR SOLUTION INTRAMUSCULAR; INTRAVENOUS SEE ADMIN INSTRUCTIONS
Status: CANCELLED | OUTPATIENT
Start: 2018-04-17

## 2018-04-17 RX ORDER — CEFAZOLIN SODIUM 2 G/100ML
2 INJECTION, SOLUTION INTRAVENOUS
Status: CANCELLED | OUTPATIENT
Start: 2018-04-17

## 2018-04-17 RX ORDER — ACETAMINOPHEN 325 MG/1
975 TABLET ORAL ONCE
Status: CANCELLED | OUTPATIENT
Start: 2018-04-17 | End: 2018-04-17

## 2018-04-19 ENCOUNTER — HOSPITAL ENCOUNTER (OUTPATIENT)
Dept: PHYSICAL THERAPY | Facility: OTHER | Age: 83
Setting detail: THERAPIES SERIES
End: 2018-04-19
Attending: NURSE PRACTITIONER
Payer: MEDICARE

## 2018-04-19 PROCEDURE — 40000185 ZZHC STATISTIC PT OUTPT VISIT

## 2018-04-19 PROCEDURE — 97110 THERAPEUTIC EXERCISES: CPT | Mod: GP

## 2018-04-24 ENCOUNTER — ANESTHESIA EVENT (OUTPATIENT)
Dept: SURGERY | Facility: OTHER | Age: 83
End: 2018-04-24
Payer: MEDICARE

## 2018-04-25 ENCOUNTER — HOSPITAL ENCOUNTER (OUTPATIENT)
Facility: OTHER | Age: 83
Discharge: HOME | End: 2018-04-25
Attending: ORTHOPAEDIC SURGERY | Admitting: ORTHOPAEDIC SURGERY
Payer: MEDICARE

## 2018-04-25 ENCOUNTER — SURGERY (OUTPATIENT)
Age: 83
End: 2018-04-25

## 2018-04-25 ENCOUNTER — ANESTHESIA (OUTPATIENT)
Dept: SURGERY | Facility: OTHER | Age: 83
End: 2018-04-25
Payer: MEDICARE

## 2018-04-25 VITALS
TEMPERATURE: 97.3 F | OXYGEN SATURATION: 94 % | SYSTOLIC BLOOD PRESSURE: 129 MMHG | RESPIRATION RATE: 18 BRPM | DIASTOLIC BLOOD PRESSURE: 66 MMHG

## 2018-04-25 DIAGNOSIS — G56.03 BILATERAL CARPAL TUNNEL SYNDROME: ICD-10-CM

## 2018-04-25 DIAGNOSIS — Z98.890 HISTORY OF CARPAL TUNNEL SURGERY: Primary | ICD-10-CM

## 2018-04-25 PROCEDURE — 36000052 ZZH SURGERY LEVEL 2 EA 15 ADDTL MIN: Performed by: ORTHOPAEDIC SURGERY

## 2018-04-25 PROCEDURE — 37000009 ZZH ANESTHESIA TECHNICAL FEE, EACH ADDTL 15 MIN: Performed by: ORTHOPAEDIC SURGERY

## 2018-04-25 PROCEDURE — 64721 CARPAL TUNNEL SURGERY: CPT | Performed by: ORTHOPAEDIC SURGERY

## 2018-04-25 PROCEDURE — 64721 CARPAL TUNNEL SURGERY: CPT | Performed by: NURSE ANESTHETIST, CERTIFIED REGISTERED

## 2018-04-25 PROCEDURE — 99100 ANES PT EXTEME AGE<1 YR&>70: CPT | Performed by: NURSE ANESTHETIST, CERTIFIED REGISTERED

## 2018-04-25 PROCEDURE — 37000008 ZZH ANESTHESIA TECHNICAL FEE, 1ST 30 MIN: Performed by: ORTHOPAEDIC SURGERY

## 2018-04-25 PROCEDURE — 36000050 ZZH SURGERY LEVEL 2 1ST 30 MIN: Performed by: ORTHOPAEDIC SURGERY

## 2018-04-25 PROCEDURE — 25000128 H RX IP 250 OP 636: Performed by: ANESTHESIOLOGY

## 2018-04-25 PROCEDURE — 25000125 ZZHC RX 250: Performed by: ORTHOPAEDIC SURGERY

## 2018-04-25 PROCEDURE — 71000014 ZZH RECOVERY PHASE 1 LEVEL 2 FIRST HR: Performed by: ORTHOPAEDIC SURGERY

## 2018-04-25 PROCEDURE — 25000128 H RX IP 250 OP 636: Performed by: NURSE ANESTHETIST, CERTIFIED REGISTERED

## 2018-04-25 PROCEDURE — 25000132 ZZH RX MED GY IP 250 OP 250 PS 637: Mod: GY | Performed by: ORTHOPAEDIC SURGERY

## 2018-04-25 PROCEDURE — A9270 NON-COVERED ITEM OR SERVICE: HCPCS | Mod: GY | Performed by: ORTHOPAEDIC SURGERY

## 2018-04-25 PROCEDURE — 27210995 ZZH RX 272: Performed by: ORTHOPAEDIC SURGERY

## 2018-04-25 PROCEDURE — 27210794 ZZH OR GENERAL SUPPLY STERILE: Performed by: ORTHOPAEDIC SURGERY

## 2018-04-25 PROCEDURE — 40000306 ZZH STATISTIC PRE PROC ASSESS II: Performed by: ORTHOPAEDIC SURGERY

## 2018-04-25 PROCEDURE — 71000027 ZZH RECOVERY PHASE 2 EACH 15 MINS: Performed by: ORTHOPAEDIC SURGERY

## 2018-04-25 PROCEDURE — 64721 CARPAL TUNNEL SURGERY: CPT | Performed by: ANESTHESIOLOGY

## 2018-04-25 PROCEDURE — 25000125 ZZHC RX 250: Performed by: ANESTHESIOLOGY

## 2018-04-25 PROCEDURE — 25000128 H RX IP 250 OP 636: Performed by: ORTHOPAEDIC SURGERY

## 2018-04-25 PROCEDURE — 25000125 ZZHC RX 250: Performed by: NURSE ANESTHETIST, CERTIFIED REGISTERED

## 2018-04-25 RX ORDER — HYDROMORPHONE HYDROCHLORIDE 1 MG/ML
.3-.5 INJECTION, SOLUTION INTRAMUSCULAR; INTRAVENOUS; SUBCUTANEOUS EVERY 10 MIN PRN
Status: DISCONTINUED | OUTPATIENT
Start: 2018-04-25 | End: 2018-04-25 | Stop reason: HOSPADM

## 2018-04-25 RX ORDER — ONDANSETRON 2 MG/ML
INJECTION INTRAMUSCULAR; INTRAVENOUS PRN
Status: DISCONTINUED | OUTPATIENT
Start: 2018-04-25 | End: 2018-04-25

## 2018-04-25 RX ORDER — SODIUM CHLORIDE, SODIUM LACTATE, POTASSIUM CHLORIDE, CALCIUM CHLORIDE 600; 310; 30; 20 MG/100ML; MG/100ML; MG/100ML; MG/100ML
INJECTION, SOLUTION INTRAVENOUS CONTINUOUS
Status: DISCONTINUED | OUTPATIENT
Start: 2018-04-25 | End: 2018-04-25 | Stop reason: HOSPADM

## 2018-04-25 RX ORDER — HYDROCODONE BITARTRATE AND ACETAMINOPHEN 5; 325 MG/1; MG/1
1 TABLET ORAL EVERY 4 HOURS PRN
Qty: 5 TABLET | Refills: 0 | Status: SHIPPED | OUTPATIENT
Start: 2018-04-25 | End: 2018-05-11

## 2018-04-25 RX ORDER — LIDOCAINE HYDROCHLORIDE 20 MG/ML
INJECTION, SOLUTION INFILTRATION; PERINEURAL PRN
Status: DISCONTINUED | OUTPATIENT
Start: 2018-04-25 | End: 2018-04-25

## 2018-04-25 RX ORDER — LIDOCAINE HYDROCHLORIDE AND EPINEPHRINE 10; 10 MG/ML; UG/ML
INJECTION, SOLUTION INFILTRATION; PERINEURAL PRN
Status: DISCONTINUED | OUTPATIENT
Start: 2018-04-25 | End: 2018-04-25 | Stop reason: HOSPADM

## 2018-04-25 RX ORDER — DEXAMETHASONE SODIUM PHOSPHATE 4 MG/ML
4 INJECTION, SOLUTION INTRA-ARTICULAR; INTRALESIONAL; INTRAMUSCULAR; INTRAVENOUS; SOFT TISSUE EVERY 10 MIN PRN
Status: DISCONTINUED | OUTPATIENT
Start: 2018-04-25 | End: 2018-04-25 | Stop reason: HOSPADM

## 2018-04-25 RX ORDER — BUPIVACAINE HYDROCHLORIDE 2.5 MG/ML
INJECTION, SOLUTION EPIDURAL; INFILTRATION; INTRACAUDAL PRN
Status: DISCONTINUED | OUTPATIENT
Start: 2018-04-25 | End: 2018-04-25 | Stop reason: HOSPADM

## 2018-04-25 RX ORDER — MAGNESIUM HYDROXIDE 1200 MG/15ML
LIQUID ORAL PRN
Status: DISCONTINUED | OUTPATIENT
Start: 2018-04-25 | End: 2018-04-25 | Stop reason: HOSPADM

## 2018-04-25 RX ORDER — HYDRALAZINE HYDROCHLORIDE 20 MG/ML
2.5-5 INJECTION INTRAMUSCULAR; INTRAVENOUS EVERY 10 MIN PRN
Status: DISCONTINUED | OUTPATIENT
Start: 2018-04-25 | End: 2018-04-25 | Stop reason: HOSPADM

## 2018-04-25 RX ORDER — FENTANYL CITRATE 50 UG/ML
25-50 INJECTION, SOLUTION INTRAMUSCULAR; INTRAVENOUS EVERY 5 MIN PRN
Status: DISCONTINUED | OUTPATIENT
Start: 2018-04-25 | End: 2018-04-25 | Stop reason: HOSPADM

## 2018-04-25 RX ORDER — OXYCODONE HYDROCHLORIDE 5 MG/1
5 TABLET ORAL EVERY 4 HOURS PRN
Status: DISCONTINUED | OUTPATIENT
Start: 2018-04-25 | End: 2018-04-25 | Stop reason: HOSPADM

## 2018-04-25 RX ORDER — FENTANYL CITRATE 50 UG/ML
50 INJECTION, SOLUTION INTRAMUSCULAR; INTRAVENOUS
Status: COMPLETED | OUTPATIENT
Start: 2018-04-25 | End: 2018-04-25

## 2018-04-25 RX ORDER — ACETAMINOPHEN 325 MG/1
650 TABLET ORAL
Status: DISCONTINUED | OUTPATIENT
Start: 2018-04-25 | End: 2018-04-25 | Stop reason: HOSPADM

## 2018-04-25 RX ORDER — PROPOFOL 10 MG/ML
INJECTION, EMULSION INTRAVENOUS CONTINUOUS PRN
Status: DISCONTINUED | OUTPATIENT
Start: 2018-04-25 | End: 2018-04-25

## 2018-04-25 RX ORDER — PROPOFOL 10 MG/ML
INJECTION, EMULSION INTRAVENOUS PRN
Status: DISCONTINUED | OUTPATIENT
Start: 2018-04-25 | End: 2018-04-25

## 2018-04-25 RX ORDER — LIDOCAINE 40 MG/G
CREAM TOPICAL
Status: DISCONTINUED | OUTPATIENT
Start: 2018-04-25 | End: 2018-04-25 | Stop reason: HOSPADM

## 2018-04-25 RX ORDER — CEFAZOLIN SODIUM 2 G/100ML
2 INJECTION, SOLUTION INTRAVENOUS
Status: COMPLETED | OUTPATIENT
Start: 2018-04-25 | End: 2018-04-25

## 2018-04-25 RX ORDER — ONDANSETRON 2 MG/ML
4 INJECTION INTRAMUSCULAR; INTRAVENOUS EVERY 30 MIN PRN
Status: DISCONTINUED | OUTPATIENT
Start: 2018-04-25 | End: 2018-04-25 | Stop reason: HOSPADM

## 2018-04-25 RX ORDER — CEFAZOLIN SODIUM 1 G/50ML
1 INJECTION, SOLUTION INTRAVENOUS SEE ADMIN INSTRUCTIONS
Status: DISCONTINUED | OUTPATIENT
Start: 2018-04-25 | End: 2018-04-25 | Stop reason: HOSPADM

## 2018-04-25 RX ORDER — PANTOPRAZOLE SODIUM 40 MG/1
40 TABLET, DELAYED RELEASE ORAL
Status: COMPLETED | OUTPATIENT
Start: 2018-04-25 | End: 2018-04-25

## 2018-04-25 RX ORDER — ONDANSETRON 4 MG/1
4 TABLET, ORALLY DISINTEGRATING ORAL EVERY 30 MIN PRN
Status: DISCONTINUED | OUTPATIENT
Start: 2018-04-25 | End: 2018-04-25 | Stop reason: HOSPADM

## 2018-04-25 RX ORDER — NALOXONE HYDROCHLORIDE 0.4 MG/ML
.1-.4 INJECTION, SOLUTION INTRAMUSCULAR; INTRAVENOUS; SUBCUTANEOUS
Status: DISCONTINUED | OUTPATIENT
Start: 2018-04-25 | End: 2018-04-25 | Stop reason: HOSPADM

## 2018-04-25 RX ORDER — ALBUTEROL SULFATE 0.83 MG/ML
2.5 SOLUTION RESPIRATORY (INHALATION) EVERY 4 HOURS PRN
Status: DISCONTINUED | OUTPATIENT
Start: 2018-04-25 | End: 2018-04-25 | Stop reason: HOSPADM

## 2018-04-25 RX ORDER — ACETAMINOPHEN 325 MG/1
975 TABLET ORAL ONCE
Status: COMPLETED | OUTPATIENT
Start: 2018-04-25 | End: 2018-04-25

## 2018-04-25 RX ORDER — MEPERIDINE HYDROCHLORIDE 50 MG/ML
12.5 INJECTION INTRAMUSCULAR; INTRAVENOUS; SUBCUTANEOUS
Status: DISCONTINUED | OUTPATIENT
Start: 2018-04-25 | End: 2018-04-25 | Stop reason: HOSPADM

## 2018-04-25 RX ADMIN — LIDOCAINE HYDROCHLORIDE,EPINEPHRINE BITARTRATE 8 ML: 10; .01 INJECTION, SOLUTION INFILTRATION; PERINEURAL at 08:03

## 2018-04-25 RX ADMIN — PROPOFOL 200 MCG/KG/MIN: 10 INJECTION, EMULSION INTRAVENOUS at 07:45

## 2018-04-25 RX ADMIN — PANTOPRAZOLE SODIUM 40 MG: 40 TABLET, DELAYED RELEASE ORAL at 07:13

## 2018-04-25 RX ADMIN — ACETAMINOPHEN 975 MG: 325 TABLET, FILM COATED ORAL at 07:14

## 2018-04-25 RX ADMIN — CEFAZOLIN SODIUM 2 G: 2 INJECTION, SOLUTION INTRAVENOUS at 07:45

## 2018-04-25 RX ADMIN — PROPOFOL 200 MG: 10 INJECTION, EMULSION INTRAVENOUS at 07:43

## 2018-04-25 RX ADMIN — LIDOCAINE HYDROCHLORIDE 0.1 ML: 10 INJECTION, SOLUTION EPIDURAL; INFILTRATION; INTRACAUDAL; PERINEURAL at 07:25

## 2018-04-25 RX ADMIN — SODIUM CHLORIDE 50 ML: 900 IRRIGANT IRRIGATION at 08:03

## 2018-04-25 RX ADMIN — BUPIVACAINE HYDROCHLORIDE 8 ML: 2.5 INJECTION, SOLUTION EPIDURAL; INFILTRATION; INTRACAUDAL; PERINEURAL at 08:03

## 2018-04-25 RX ADMIN — ONDANSETRON 4 MG: 2 INJECTION INTRAMUSCULAR; INTRAVENOUS at 07:43

## 2018-04-25 RX ADMIN — FENTANYL CITRATE 50 MCG: 50 INJECTION, SOLUTION INTRAMUSCULAR; INTRAVENOUS at 07:54

## 2018-04-25 RX ADMIN — LIDOCAINE HYDROCHLORIDE 100 MG: 20 INJECTION, SOLUTION INFILTRATION; PERINEURAL at 07:43

## 2018-04-25 RX ADMIN — SODIUM CHLORIDE, POTASSIUM CHLORIDE, SODIUM LACTATE AND CALCIUM CHLORIDE: 600; 310; 30; 20 INJECTION, SOLUTION INTRAVENOUS at 07:25

## 2018-04-25 NOTE — OP NOTE
Procedure Date: 04/25/2018      DATE OF PROCEDURE:  04/25/2018      PREOPERATIVE DIAGNOSIS:  Bilateral carpal tunnel syndrome, right greater than left.      POSTOPERATIVE DIAGNOSIS:  Bilateral carpal tunnel syndrome, right greater than left.      PROCEDURE:     1.  Right carpal tunnel release (DOS 04/25/2018).   2.  Left carpal tunnel syndrome.      IMPLANTS:  None.      SURGEON:  Gerald Martínez DO      ASSISTANT:  None.      ANESTHESIA:  General via LMA.      ESTIMATED BLOOD LOSS:  Less than 5 mL.      FLUIDS:  See chart.      DRAINS:  None.      COMPLICATIONS:  None.      DISPOSITION:  Stable to postop.      INDICATIONS:  The patient is an 86-year-old female who had been having ongoing complaints of numbness and tingling in both hands.  She had previously had right ORIF of the distal radius.  She was having more symptoms.  She had tried conservative measures including splinting as well as she had underwent her EMG.  It did show significant compression of both nerves, right greater than left.  She elected for operative intervention.      PROCEDURE NOTE:  After informed consent was received from the patient having listed all the risks and benefits as noted on the consent form, but not limited to the consent form, and having discussed all conservative surgical and alternatives to treatment, the patient signed a consent form with a witness present.  The patient understood there were numerous risks.  All of them were not written down but were discussed at length.  No guarantees were given.  All questions were answered prior to signing consent form.  The patient was given antibiotics 1/2 hour prior to skin incision.  She was taken back to the operating room supine on a gurney, transferred to the operating table, secured and all bony prominences were padded.  She was then given general anesthesia via laryngeal mask airway.  Once proper anesthesia was obtained, tourniquet was placed then inflated, and the patient's  right upper extremity was sterilely prepped and draped.      A timeout was performed according to institutional guidelines.  With this done I then marked my incision site, elevated the arm and inflated the tourniquet.  Skin incision only was made with a #15 scalpel.  Blunt dissection was performed until the transverse carpal ligament was identified.  I then made a small nick incision in transverse carpal ligament, passed a [place] proximally and distally, and then released the proximal and distal components of the transverse carpal ligament under direct visualization.  The nerve itself when examined was noted to have an hourglass deformity.  I could see where she had an area of bruising within the nerve.  I released the tourniquet, noted the hyperemic effect into that area.  I irrigated everything, maintained hemostasis, and then irrigated again.  I closed the deeper subcutaneous tissues with 3-0 Monocryl interrupted and the skin was closed with 3-0 nylon in horizontal mattress fashion.  The area was infiltrated with local.      Sterile dressings, Xeroform, 4 x 4, and a well-padded cock-up wrist splint were applied.  She was extubated and transferred back to [facility], taken to recovery room in satisfactory condition, and will be discharged home per protocol.  She was given prescription for Norco 5/325, #5, no refills, she will have a 2-pound weight restriction on her hand, and she is to keep her dressings clean and dry and maintained until seen in the office.  All questions and concerns have been answered prior to surgery and I did discuss the case after surgery with her .  She will be following up as already arranged.         NOA WADSWORTH DO             D: 2018   T: 2018   MT: JAMES      Name:     MERARI ABAD   MRN:      [id number]        Account:        [id number]   :      1932           Procedure Date: 2018      Document: [id number]

## 2018-04-25 NOTE — ANESTHESIA CARE TRANSFER NOTE
Patient: Mervat Lockett    Procedure(s):  Right Open Carpal Tunnel Release - Wound Class: I-Clean    Diagnosis: right carpal tunnel syndrome  Diagnosis Additional Information: No value filed.    Anesthesia Type:   General, LMA     Note:  Airway :LMA and T-piece  Patient transferred to:PACU  Handoff Report: Identifed the Patient, Identified the Reponsible Provider, Reviewed the pertinent medical history, Discussed the surgical course, Reviewed Intra-OP anesthesia mangement and issues during anesthesia, Set expectations for post-procedure period and Allowed opportunity for questions and acknowledgement of understanding      Vitals: (Last set prior to Anesthesia Care Transfer)    CRNA VITALS  4/25/2018 0747 - 4/25/2018 0817      4/25/2018             Resp Rate (set): 10                Electronically Signed By: ALANA Joiner CRNA  April 25, 2018  8:17 AM

## 2018-04-25 NOTE — OP NOTE
POST OPERATIVE / POST PROCEDURE NOTE - IMMEDIATE :    Surgeon(s)/Proceduralist(s) and Assistants (if any):  Surgeon(s):  Gerald Martínez DO    Procedure(s):  Right CTR    Procedure(s) findings:   See op note    Specimen(s) removed: No    (EBL) Estimated blood loss (ml): 5    Postoperative/Postprocedure Diagnosis:   See op note    Gerald Martínez D.O.  [place] [address]  Phone [phone] (KNEE)  Fax [phone]    8:07 AM 4/25/2018

## 2018-04-25 NOTE — IP AVS SNAPSHOT
MRN:[id number]                      After Visit Summary   4/25/2018    Mervat Lockett    MRN: [id number]           Thank you!     Thank you for choosing [facility] for your care. Our goal is always to provide you with excellent care. Hearing back from our patients is one way we can continue to improve our services. Please take a few minutes to complete the written survey that you may receive in the mail after you visit with us. Thank you!        Patient Information     Date Of Birth          1/2/1932        Designated Caregiver       Most Recent Value    Caregiver    Will someone help with your care after discharge? yes    Name of designated caregiver channing -     Phone number of caregiver [phone]      About your hospital stay     You were admitted on:  April 25, 2018 You last received care in the:  [facility]    You were discharged on:  April 25, 2018       Who to Call     For medical emergencies, please call 911.  For non-urgent questions about your medical care, please call your primary care provider or clinic, [phone]  For questions related to your surgery, please call your surgery clinic        Attending Provider     Provider Specialty    Gerald Martínez, DO Orthopaedic Surgery       Primary Care Provider Office Phone # Fax #    Daniel Beyer [phone]      After Care Instructions      Diet as Tolerated       Return to diet before surgery, unless instructed otherwise.            Discharge Instructions       Review outpatient procedure discharge instructions with patient as directed by Provider            Discharge Instructions - Lifting Limit (specify)       Lifting limit 2 pounds until seen at Post-op follow up appointment.            Ice to affected area       Ice pack to surgical site every 30 minutes per hour over the next week.            No Dressing Change       No dressing change until follow up appointment. Keep clean and dry.             Return to clinic       Return to clinic in 2 weeks            Shower        Cover dressing if dressing is not going to be changed today            Wound care       Do not immerse wound in water until sutures removed                  Your next 10 appointments already scheduled     May 11, 2018 10:15 AM CDT   Return Visit with Gerald Martínez DO   [facility] ([facility])    [address]  [place] [id number] [phone]              Further instructions from your care team       [facility] Same-Day Surgery   Adult Discharge Orders & Instructions     For 24 hours after surgery    1. Get plenty of rest.  A responsible adult must stay with you for at least 24 hours after you leave the hospital.   2. Do not drive or use heavy equipment.  If you have weakness or tingling, don't drive or use heavy equipment until this feeling goes away.  3. Do not drink alcohol.  4. Avoid strenuous or risky activities.  Ask for help when climbing stairs.   5. You may feel lightheaded.  IF so, sit for a few minutes before standing.  Have someone help you get up.   6. If you have nausea (feel sick to your stomach): Drink only clear liquids such as apple juice, ginger ale, broth or 7-Up.  Rest may also help.  Be sure to drink enough fluids.  Move to a regular diet as you feel able.  7. You may have a slight fever. Call the doctor if your fever is over 101 F (38.3 C) (taken under the tongue) or lasts longer than 24 hours.  8. You may have a dry mouth, a sore throat, muscle aches or trouble sleeping.  These should go away after 24 hours.  9. Do not make important or legal decisions.   Call your doctor for any of the followin.  Signs of infection (fever, growing tenderness at the surgery site, a large amount of drainage or bleeding, severe pain, foul-smelling drainage, redness, swelling).    2. It has been over 8 to 10 hours since surgery and you are still not able  to urinate (pass water).    3.  Headache for over 24 hours.    4.  Numbness, tingling or weakness the day after surgery (if you had spinal anesthesia).  To contact a doctor, call    [phone]___________________________    Pending Results     No orders found from 4/23/2018 to 4/26/2018.            Admission Information     Date & Time Provider Department Dept. Phone    4/25/2018 Gerald Martínez, DO [facility] [phone]      Your Vitals Were     Blood Pressure Temperature Respirations Pulse Oximetry          123/62 97.3  F (36.3  C) (Temporal) 18 96%        MyChart Information     [facility] gives you secure access to your electronic health record. If you see a primary care provider, you can also send messages to your care team and make appointments. If you have questions, please call your primary care clinic.  If you do not have a primary care provider, please call [phone] and they will assist you.        Care EveryWhere ID     This is your Care EveryWhere ID. This could be used by other organizations to access your [place] medical records  [id number]        Equal Access to Services     MARIAA CIFUENTES AH: Inder Villagran, brijesh hook, evelina little, coby kay. So [facility] [phone].    ATENCIÓN: Si habla español, tiene a echeverria disposición servicios gratuitos de asistencia lingüística. Llame al [phone].    We comply with applicable federal civil rights laws and [place] laws. We do not discriminate on the basis of race, color, national origin, age, disability, sex, sexual orientation, or gender identity.               Review of your medicines      START taking        Dose / Directions    HYDROcodone-acetaminophen 5-325 MG per tablet   Commonly known as:  NORCO        Dose:  1 tablet   Take 1 tablet by mouth every 4 hours as needed for other (Moderate to Severe Pain)   Quantity:  5 tablet   Refills:  0         CONTINUE these  medicines which have NOT CHANGED        Dose / Directions    esomeprazole 40 MG CR capsule   Commonly known as:  nexIUM        Dose:  20 mg   Take 20 mg by mouth every morning (before breakfast)   Refills:  0       metoprolol succinate 50 MG 24 hr tablet   Commonly known as:  TOPROL-XL        Dose:  25 mg   25 mg 2 times daily   Refills:  0       mirtazapine 15 MG tablet   Commonly known as:  REMERON        Refills:  0       venlafaxine 37.5 MG 24 hr capsule   Commonly known as:  EFFEXOR-XR        Dose:  37.5 mg   37.5 mg 2 times daily   Refills:  0            Where to get your medicines      Some of these will need a paper prescription and others can be bought over the counter. Ask your nurse if you have questions.     Bring a paper prescription for each of these medications     HYDROcodone-acetaminophen 5-325 MG per tablet                Protect others around you: Learn how to safely use, store and throw away your medicines at www.disposemymeds.org.        Information about OPIOIDS     PRESCRIPTION OPIOIDS: WHAT YOU NEED TO KNOW   You have a prescription for an opioid (narcotic) pain medicine. Opioids can cause addiction. If you have a history of chemical dependency of any type, you are at a higher risk of becoming addicted to opioids. Only take this medicine after all other options have been tried. Take it for as short a time and as few doses as possible.     Do not:    Drive. If you drive while taking these medicines, you could be arrested for driving under the influence (DUI).    Operate heavy machinery    Do any other dangerous activities while taking these medicines.     Drink any alcohol while taking these medicines.      Take with any other medicines that contain acetaminophen. Read all labels carefully. Look for the word  acetaminophen  or  Tylenol.  Ask your pharmacist if you have questions or are unsure.    Store your pills in a secure place, locked if possible. We will not replace any lost or stolen  medicine. If you don t finish your medicine, please throw away (dispose) as directed by your pharmacist. The [place] [employer]ution [employer] has more information about safe disposal: https://www.pca.[facility].[place].us/living-green/managing-unwanted-medications    All opioids tend to cause constipation. Drink plenty of water and eat foods that have a lot of fiber, such as fruits, vegetables, prune juice, apple juice and high-fiber cereal. Take a laxative (Miralax, milk of magnesia, Colace, Senna) if you don t move your bowels at least every other day.              Medication List: This is a list of all your medications and when to take them. Check marks below indicate your daily home schedule. Keep this list as a reference.      Medications           Morning Afternoon Evening Bedtime As Needed    esomeprazole 40 MG CR capsule   Commonly known as:  nexIUM   Take 20 mg by mouth every morning (before breakfast)                                HYDROcodone-acetaminophen 5-325 MG per tablet   Commonly known as:  NORCO   Take 1 tablet by mouth every 4 hours as needed for other (Moderate to Severe Pain)                                metoprolol succinate 50 MG 24 hr tablet   Commonly known as:  TOPROL-XL   25 mg 2 times daily                                mirtazapine 15 MG tablet   Commonly known as:  REMERON                                venlafaxine 37.5 MG 24 hr capsule   Commonly known as:  EFFEXOR-XR   37.5 mg 2 times daily

## 2018-04-25 NOTE — IP AVS SNAPSHOT
[facility] [place] [facility]    [address]    [place] [address]    Phone:  [phone]    Fax:  [phone]                                       After Visit Summary   4/25/2018    Mervat Lockett    MRN: [id number]           After Visit Summary Signature Page     I have received my discharge instructions, and my questions have been answered. I have discussed any challenges I see with this plan with the nurse or doctor.    ..........................................................................................................................................  Patient/Patient Representative Signature      ..........................................................................................................................................  Patient Representative Print Name and Relationship to Patient    ..................................................               ................................................  Date                                            Time    ..........................................................................................................................................  Reviewed by Signature/Title    ...................................................              ..............................................  Date                                                            Time

## 2018-04-25 NOTE — ANESTHESIA POSTPROCEDURE EVALUATION
Patient: Mervat Lockett    Procedure(s):  Right Open Carpal Tunnel Release - Wound Class: I-Clean    Diagnosis:right carpal tunnel syndrome  Diagnosis Additional Information: No value filed.    Anesthesia Type:  General, LMA    Note:  Anesthesia Post Evaluation    Patient location during evaluation: PACU  Patient participation: Able to fully participate in evaluation  Level of consciousness: awake and alert  Pain management: adequate  Airway patency: patent  Cardiovascular status: acceptable  Respiratory status: acceptable  Hydration status: acceptable  PONV: none     Anesthetic complications: None          Last vitals:  Vitals:    04/25/18 1045 04/25/18 1100 04/25/18 1115   BP: 131/62 129/60 129/66   Resp:      Temp:      SpO2: 92% 94% 94%         Electronically Signed By: Dallas Umaña DO  April 25, 2018  11:45 AM

## 2018-04-25 NOTE — DISCHARGE INSTRUCTIONS
[facility] Same-Day [facility]   Adult Discharge Orders & Instructions     For 24 hours after surgery    1. Get plenty of rest.  A responsible adult must stay with you for at least 24 hours after you leave the hospital.   2. Do not drive or use heavy equipment.  If you have weakness or tingling, don't drive or use heavy equipment until this feeling goes away.  3. Do not drink alcohol.  4. Avoid strenuous or risky activities.  Ask for help when climbing stairs.   5. You may feel lightheaded.  IF so, sit for a few minutes before standing.  Have someone help you get up.   6. If you have nausea (feel sick to your stomach): Drink only clear liquids such as apple juice, ginger ale, broth or 7-Up.  Rest may also help.  Be sure to drink enough fluids.  Move to a regular diet as you feel able.  7. You may have a slight fever. Call the doctor if your fever is over 101 F (38.3 C) (taken under the tongue) or lasts longer than 24 hours.  8. You may have a dry mouth, a sore throat, muscle aches or trouble sleeping.  These should go away after 24 hours.  9. Do not make important or legal decisions.   Call your doctor for any of the followin.  Signs of infection (fever, growing tenderness at the surgery site, a large amount of drainage or bleeding, severe pain, foul-smelling drainage, redness, swelling).    2. It has been over 8 to 10 hours since surgery and you are still not able to urinate (pass water).    3.  Headache for over 24 hours.    4.  Numbness, tingling or weakness the day after surgery (if you had spinal anesthesia).  To contact a doctor, call    [phone]___________________________

## 2018-04-25 NOTE — H&P (VIEW-ONLY)
----------------- PREOPERATIVE EXAM ------------------  4/9/2018    SUBJECTIVE:  Mervat Locektt is a 86 year old female here for preop.    I was asked to see Mervat Lockett by Dr. Martínez for a preoperative history and physical.      Date of Surgery: 4/25/18  Type of Surgery: R carpal tunnel release  Surgeon: [facility]:  [facility]    HPI:  Pt has had sx of R CTS for some time now. She cannot quantitate how long this has been a problem. She does not feel that she has loss of strength.  Patient has no personal nor FH of life threatening anesthesia complications. Patient denies unusual bleeding tendencies. No recent history of cough, fever,cold, sweats, chills.         Allergies:  Allergies   Allergen Reactions     Paroxetine Anxiety       Latex allergy  No    Td up to date:  2012        ROS:    surgical:  patient denies previous complications from prior surgeries including but not limited to prolonged bleeding, anesthesia complications, dysrhythmias, surgical wound infections, or prolonged hospital stay.    Hx of blood transfusions:  NO      -------------------------------------------------------------    PHYSICAL EXAM:      PHYSICAL EXAMINATION  EXAM:   HEENT; Clear  Heart NSR wo Murmur  Lungs Clear  Abd; Normal  Lympatics- no noted nodes      ASSESSMENT/PLAN:  R. CTS      LABS:   CBC and BMP looked satisfactory in Feb      EKG:  Not indicated  ---------------------------------------------------------------    ASSESSEMNT AND PLAN:  1.  Preoperative history and physical   consults:  none    For above listed surgery and anesthesia:     - Patient is low  risk for perioperative complications.      PRE OP RECOMMENDATIONS:  Discontinue ASA 5 days prior to reduce bleeding risk and Discontinue NSAIDS 5 days prior to procedure to reduce bleeding risk        FLEX LEAL MD..................4/9/2018 11:07 AM

## 2018-04-25 NOTE — ANESTHESIA PREPROCEDURE EVALUATION
Anesthesia Evaluation     . Pt has had prior anesthetic. Type: MAC and General           ROS/MED HX    ENT/Pulmonary:  - neg pulmonary ROS     Neurologic:  - neg neurologic ROS     Cardiovascular:     (+) hypertension----. : . . . :. .       METS/Exercise Tolerance:     Hematologic:  - neg hematologic  ROS       Musculoskeletal:  - neg musculoskeletal ROS       GI/Hepatic:  - neg GI/hepatic ROS       Renal/Genitourinary:  - ROS Renal section negative       Endo:  - neg endo ROS       Psychiatric:  - neg psychiatric ROS   (+) psychiatric history anxiety      Infectious Disease:  - neg infectious disease ROS       Malignancy:      - no malignancy   Other:    (+) No chance of pregnancy C-spine cleared: N/A, no H/O Chronic Pain,no other significant disability                    Physical Exam  Normal systems: cardiovascular, pulmonary and dental    Airway   Mallampati: II  TM distance: >3 FB  Neck ROM: full    Dental     Cardiovascular       Pulmonary                     Anesthesia Plan      History & Physical Review      ASA Status:  2 .    NPO Status:  > 6 hours    Plan for General and LMA with Propofol and Intravenous induction. Maintenance will be TIVA.    PONV prophylaxis:  Ondansetron (or other 5HT-3) and Dexamethasone or Solumedrol       Postoperative Care  Postoperative pain management:  IV analgesics and Oral pain medications.      Consents  Anesthetic plan, risks, benefits and alternatives discussed with: .  Use of blood products discussed: No .   .                         .

## 2018-04-25 NOTE — OR NURSING
PACU Transfer Note    Mervat Lockett was transferred to DSU via cart.  Equipment used for transport:  none.  Accompanied by:  Leighann DRUMMOND RN    PACU Respiratory Event Documentation     1) Episodes of Apnea greater than or equal to 10 seconds: 0    2) Bradypnea - less than 8 breaths per minute: 0    3) Pain score on 0 to 10 scale: 0    4) Pain-sedation mismatch (yes or no): no    5) Repeated 02 desaturation less than 90% (yes or no): no    Anesthesia notified? (yes or no): na    Any of the above events occuring repeatedly in separate 30 minute intervals may be considered recurrent PACU respiratory events.    Patient stable and meets phase 1 discharge criteria for transport from PACU.

## 2018-04-25 NOTE — INTERVAL H&P NOTE
History and Physical Update    The history and physical has been reviewed and the patient's right wrist has been examined.  There are no interim changes to the patient's history or physical condition.  She is ready to proceed with planned procedure.  She understands the risks and benefits and once again these where outlined.    Gerald Martínez DO  Orthopedic Surgeon    4/25/2018 6:57 AM

## 2018-05-01 ENCOUNTER — OFFICE VISIT (OUTPATIENT)
Dept: ORTHOPEDICS | Facility: OTHER | Age: 83
End: 2018-05-01
Attending: ORTHOPAEDIC SURGERY
Payer: COMMERCIAL

## 2018-05-01 DIAGNOSIS — Z98.890 HISTORY OF CARPAL TUNNEL SURGERY: Primary | ICD-10-CM

## 2018-05-01 PROCEDURE — 99024 POSTOP FOLLOW-UP VISIT: CPT

## 2018-05-01 NOTE — PROGRESS NOTES
Dressing to right wrist unraveled and new ace wrap applied to right wrist.  Norma J. Gosselin LPN....................  5/1/2018   4:06 PM

## 2018-05-01 NOTE — MR AVS SNAPSHOT
After Visit Summary   5/1/2018    Mervat Lockett    MRN: [id number]           Patient Information     Date Of Birth          1/2/1932        Visit Information        Provider Department      5/1/2018 3:45 PM [facility]        Today's Diagnoses     S/P right carpal tunnel surgery    -  1       Follow-ups after your visit        Your next 10 appointments already scheduled     May 11, 2018 10:15 AM CDT   Return Visit with Gerald Martínez DO   [facility] [place] [facility] ([facility])    [address]  [place] [id number] [phone]              Who to contact     If you have questions or need follow up information about today's clinic visit or your schedule please contact [facility] [place] directly at [phone].  Normal or non-critical lab and imaging results will be communicated to you by 24PageBook[employer]art, letter or phone within 4 business days after the clinic has received the results. If you do not hear from us within 7 days, please contact the clinic through 24PageBookshart or phone. If you have a critical or abnormal lab result, we will notify you by phone as soon as possible.  Submit refill requests through [employer] or call your pharmacy and they will forward the refill request to us. Please allow 3 business days for your refill to be completed.          Additional Information About Your Visit        MyChart Information     [employer] gives you secure access to your electronic health record. If you see a primary care provider, you can also send messages to your care team and make appointments. If you have questions, please call your primary care clinic.  If you do not have a primary care provider, please call [phone] and they will assist you.        Care EveryWhere ID     This is your Care EveryWhere ID. This could be used by other organizations to access your [place] medical records  [id number]         Blood  Pressure from Last 3 Encounters:   04/25/18 129/66   04/09/18 122/76   04/02/18 138/64    Weight from Last 3 Encounters:   04/09/18 74.5 kg (164 lb 3.2 oz)   04/02/18 72.6 kg (160 lb)   02/23/18 72.6 kg (160 lb)              Today, you had the following     No orders found for display       Primary Care Provider Office Phone # Fax #    Daniel Beyer [phone] [address] [facility] [address]        Equal Access to Services     [facility]: Hadii aad ku hadasho Soomaali, waaxda luqadaha, qaybta kaalmada sidney, coby feliciano . So [facility] [phone].    ATENCIÓN: Si habla español, tiene a echeverria disposición servicios gratuitos de asistencia lingüística. [facility] [phone].    We comply with applicable federal civil rights laws and [place] laws. We do not discriminate on the basis of race, color, national origin, age, disability, sex, sexual orientation, or gender identity.            Thank you!     Thank you for choosing [facility]  for your care. Our goal is always to provide you with excellent care. Hearing back from our patients is one way we can continue to improve our services. Please take a few minutes to complete the written survey that you may receive in the mail after your visit with us. Thank you!             Your Updated Medication List - Protect others around you: Learn how to safely use, store and throw away your medicines at www.disposemymeds.org.          This list is accurate as of 5/1/18  4:08 PM.  Always use your most recent med list.                   Brand Name Dispense Instructions for use Diagnosis    esomeprazole 40 MG CR capsule    nexIUM     Take 20 mg by mouth every morning (before breakfast)        HYDROcodone-acetaminophen 5-325 MG per tablet    NORCO    5 tablet    Take 1 tablet by mouth every 4 hours as needed for other (Moderate to Severe Pain)    History of carpal tunnel surgery        metoprolol succinate 50 MG 24 hr tablet    TOPROL-XL     25 mg 2 times daily        mirtazapine 15 MG tablet    REMERON          venlafaxine 37.5 MG 24 hr capsule    EFFEXOR-XR     37.5 mg 2 times daily

## 2018-05-08 ENCOUNTER — OFFICE VISIT (OUTPATIENT)
Dept: FAMILY MEDICINE | Facility: OTHER | Age: 83
End: 2018-05-08
Attending: PHYSICIAN ASSISTANT
Payer: COMMERCIAL

## 2018-05-08 VITALS
WEIGHT: 162 LBS | BODY MASS INDEX: 29.16 KG/M2 | RESPIRATION RATE: 16 BRPM | TEMPERATURE: 98.6 F | DIASTOLIC BLOOD PRESSURE: 82 MMHG | HEART RATE: 76 BPM | SYSTOLIC BLOOD PRESSURE: 150 MMHG

## 2018-05-08 DIAGNOSIS — R21 RASH: Primary | ICD-10-CM

## 2018-05-08 PROCEDURE — 99213 OFFICE O/P EST LOW 20 MIN: CPT | Mod: 24 | Performed by: PHYSICIAN ASSISTANT

## 2018-05-08 PROCEDURE — G0463 HOSPITAL OUTPT CLINIC VISIT: HCPCS

## 2018-05-08 RX ORDER — TRIAMCINOLONE ACETONIDE 0.25 MG/G
OINTMENT TOPICAL
Qty: 15 G | Refills: 0 | Status: SHIPPED | OUTPATIENT
Start: 2018-05-08 | End: 2018-07-09

## 2018-05-08 NOTE — NURSING NOTE
Patient presents to clinic for complaint of rash on chest. She states that it itches and burns. She also thinks it has begun to spread up her neck. She thinks its started 1-2 weeks ago.  Jose Rojas LPN...... 1:00 PM 5/8/2018

## 2018-05-08 NOTE — PROGRESS NOTES
Subjective: Mervat Lockett a 86 year old female who presents with:    Rash on chest and antecubital on right arm. Onset 2 days,itching. Moderate severity.  No exposures   Location: right antecubital space, chest. Course is worsening    No known exposures: denies new soaps, foods, travel, pets, clothing, cosmetics, metals, medications  She was working in her garden in the past 2 days  She did have carpel tunnel release surgery 2 weeks ago, has a short hand splint on right  Denies fever, sweats, chills, facial or throat swelling, difficulty breathing, chest tightness, abdominal pain        Past Medical History:   Diagnosis Date     Asymptomatic menopausal state     with mild symptoms     B12 deficiency 3/9/2012     Bilateral carpal tunnel syndrome 2/23/2018     Closed Colles' fracture of right radius with routine healing     04/13/2017     Disorder of bone and cartilage 2/1/2018     Flushing     Occasional hot flashes     Hypercholesterolemia 2/1/2018     Hypertension 11/18/2010     Personal history of other medical treatment (CODE)     G5, P4-0-1-4     Personal history of other medical treatment (CODE)     Hospitalization for tachycardia and PVCs     Plantar wart     No Comments Provided     Presence of other bone and tendon implants     4/21/2017     Primary osteoarthritis of one knee     11/6/2014     S/P right carpal tunnel surgery 4/25/2018     Current Outpatient Prescriptions   Medication     triamcinolone (KENALOG) 0.025 % ointment     esomeprazole (NEXIUM) 40 MG CR capsule     metoprolol (TOPROL-XL) 50 MG 24 hr tablet     mirtazapine (REMERON) 15 MG tablet     venlafaxine (EFFEXOR-XR) 37.5 MG 24 hr capsule     No current facility-administered medications for this visit.         Allergies   Allergen Reactions     Paroxetine Anxiety         Objective:  Vitals:    05/08/18 1303   BP: 150/82   BP Location: Right arm   Patient Position: Sitting   Cuff Size: Adult Regular   Pulse: 76   Resp: 16   Temp: 98.6  F (37   C)   TempSrc: Tympanic   Weight: 162 lb (73.5 kg)     General: alert and oriented, NAD  Skin: Antecubital space on right arm is erythematous with excoriations. No warmth. Chest: mild erythema across chest. No warmth or papular lesions.   Eyes: conjunctiva is clear. Both eyes have drainage and crusting on eye lashes.   HENT: Head is atraumatic, normocephalic. Ears: pearly gray. Nose: clear rhinorrhea.  Mouth: normal mucosa. Neck: soft, no adenopathy. No throat or facial swelling  Cardiac: normal RR, no murmur  Respiratory: normal respiration, clear to ausculation  Abdomen: soft, non tender, normal bowel sounds    ASSESSMENT:      ICD-10-CM    1. Rash R21 triamcinolone (KENALOG) 0.025 % ointment       RX per EPIC   Discussed symptomatic treatments per AVS  Return to clinic if symptoms persist or worsen  Patient received verbal and written instruction including review of warning signs

## 2018-05-08 NOTE — PATIENT INSTRUCTIONS
Rash to chest and right arm.   Apply topical triamcinolone ointment to affected area, smallest effected dose, twice daily for 7-14 days. Do not use this on face or genital area.   Can apply moisturizer after bath - such as vaseline or Vanicream or Cetafil lotions.   For itching - cool compress, Aveno oatmeal bath, calamine lotion may sooth it.   OTC antihistamine Cetirizine 10 mg oral tablet during the day time and benadryl at night time.   Benadryl 25-50 mg every 4-6 hours, max dose per day is 300 g. This can cause drowsiness  Follow up with PCP if symptoms persist or worsen  Seek immediate care for    Spreading areas of itching, redness or swelling    Nausea or stomach cramps or abdominal pain    Continuing or recurring symptoms    Spreading areas of redness, swelling, or itching    Signs of infection at the affected site:  ? Spreading redness  ? Increased pain or swelling  ? Fluid or colored drainage from the site  ? Fever of 100.4 F (38 C) or above lasting for 24 to 48 hours, or as directed by your provider    General Allergic Reactions  An allergic reaction is a set of symptoms caused by an allergen. An allergen is something that causes a person s immune system to react. When a person comes in contact with an allergen, it causes the body to release chemicals. These include the chemical histamine. Histamine causes swelling and itching. It may affect the entire body. This is called a general allergic reaction. Often symptoms affect only 1 part of the body. This is called a local allergic reaction.  You are having an allergic reaction. Almost anything can cause one. Different people are allergic to different things. It is usually something that you ate or swallowed, came into contact with by getting or putting it on your skin or clothes, or something you breathed in the air. This can be very annoying and sometimes scary.  Most of us think of allergic reactions when we have a rash or itchy skin. Symptoms can  include:    Itching of the eyes, nose, and roof of the mouth    Runny or stuffy nose    Watery eyes     Sneezing or coughing     A blocked feeling in the ear    Red, itchy rash called hives    Red and purple spots    Rash, redness, welts, blisters    Itching, burning, stinging, pain    Dry, flaky, cracking, scaly skin  Severe symptoms include:    Swelling of the face, lips, or other parts of the body    Hoarse voice    Trouble swallowing, feeling like your throat is closing    Trouble breathing, wheezing    Nausea, vomiting, diarrhea, stomach cramps    Feeling faint or lightheaded, rapid heart rate  Sometimes the cause may be obvious. But there are so many things that can cause a reaction that you may not be able to figure out. The most important things to help find your allergen are:    Remembering when it started    What you were doing at the time or just before that    Any activities you were involved in    Any new products or contacts  Below are some common causes. But remember that almost anything can cause a reaction. You may not even be aware that you came into contact with one of these things:    Dust, mold, pollen    Plants (common ones are poison ivy and poison oak, but there are many others)     Animals    Foods such as shrimp, shellfish, peanuts, milk products, gluten, and eggs. Also food colorings, flavorings, and additives.    Insect bites or stings such as bees, mosquitos, fleas, ticks    Medicines such as penicillin, sulfa medicines, amoxicillin, aspirin, and ibuprofen. But any medicine can cause a reaction.    Jewelry such as nickel or gold. This can be new, or something you ve worn for a while, including zippers and buttons.    Latex such as in gloves, clothes, toys, balloons, or some tapes. Some people allergic to latex may also have problems with foods like bananas, avocados, kiwi, papaya, or chestnuts.    Lotions, perfumes, cosmetics, soaps, shampoos, skincare products, nail  products    Chemicals or dyes in clothing, linen, , hair dyes, soaps, iodine  Many viruses and common colds can cause a rash that is not an allergic reaction. Sometimes it is hard to tell the difference between allergies, sensitivity, or an intolerance to something. This is especially true with food. Many things can cause diarrhea, vomiting, stomach cramps, and skin irritation.  Home care    The goal of treatment is to help relieve the symptoms and get you feeling better. The rash will usually fade over several days. But it can sometimes last a couple of weeks. Over the next couple of days, there may be times when it is gets a little worse, and then better again. Here are some things to do:    If you know what you are allergic to, stay away from it. Future reactions could be worse than this one.    Avoid tight clothing and anything that heats up your skin (hot showers or baths, direct sunlight). Heat will make itching worse.    An ice pack will relieve local areas of intense itching and redness. To make an ice pack, put ice cubes in a plastic bag that seals at the top. Wrap it in a thin, clean towel. Don t put the ice directly on the skin because it can damage the skin.    Oral diphenhydramine is an over-the-counter antihistamine sold at pharmacy and grocery stores. Unless a prescription antihistamine was given, diphenhydramine may be used to reduce itching if large areas of the skin are involved. It may make you sleepy. So be careful using it in the daytime or when going to school, working, or driving. Note: Don t use diphenhydramine if you have glaucoma or if you are a man with trouble urinating due to an enlarged prostate. There are other antihistamines that won t make you so sleepy. These are good choices for daytime use. Ask your pharmacist for suggestions.    Don t use diphenhydramine cream on your skin. It can cause a further reaction in some people.    To help prevent an infection, don't scratch the  affected area. Scratching may worsen the reaction and damage your skin. It can also lead to an infection. Always check the affected for signs of an infection.    Call your healthcare provider and ask what you can use to help decrease the itching.    To decrease allergic reactions, try the following:      Use heat-steam to clean your home    Use high-efficiency particulate (HEPA) vacuums and filters    Stay away from food and pet triggers    Kill any cockroaches    Clean your house often  Follow-up care  Follow up with your healthcare provider, or as advised. If you had a severe reaction today, or if you have had several mild to medium allergic reactions in the past, ask your provider about allergy testing. This can help you find out what you are allergic to. If your reaction included dizziness, fainting, or trouble breathing or swallowing, ask your provider about carrying auto-injectable epinephrine.  Call 911  Call 911 if any of these occur:    Trouble breathing or swallowing, wheezing    Cool, moist, pale skin    Shortness of breath    Hoarse voice or trouble speaking    Confused     Very drowsy or trouble awakening    Fainting or loss of consciousness    Rapid heart rate    Feeling of dizziness or weakness or a sudden drop in blood pressure    Feeling of doom    Feeling lightheaded    Severe nausea or vomiting, or diarrhea    Seizure    Swelling in the face, eyelids, lips, mouth, throat or tongue    Drooling  When to seek medical advice  Call your healthcare provider right away if any of these occur:    Spreading areas of itching, redness or swelling    Nausea or stomach cramps or abdominal pain    Continuing or recurring symptoms    Spreading areas of redness, swelling, or itching    Signs of infection at the affected site:  ? Spreading redness  ? Increased pain or swelling  ? Fluid or colored drainage from the site  ? Fever of 100.4 F (38 C) or above lasting for 24 to 48 hours, or as directed by your  provider  Date Last Reviewed: 3/1/2017    8697-9634 [employer] [address] [place] [address], [place] [address]. All rights reserved. This information is not intended as a substitute for professional medical care. Always follow your healthcare professional's instructions.

## 2018-05-08 NOTE — MR AVS SNAPSHOT
After Visit Summary   5/8/2018    Mervat Lockett    MRN: [id number]           Patient Information     Date Of Birth          1/2/1932        Visit Information        Provider Department      5/8/2018 12:30 PM Dora Rosado PA [facility]        Today's Diagnoses     Rash    -  1      Care Instructions    Rash to chest and right arm.   Apply topical triamcinolone ointment to affected area, smallest effected dose, twice daily for 7-14 days. Do not use this on face or genital area.   Can apply moisturizer after bath - such as vaseline or Vanicream or Cetafil lotions.   For itching - cool compress, Aveno oatmeal bath, calamine lotion may sooth it.   OTC antihistamine Cetirizine 10 mg oral tablet during the day time and benadryl at night time.   Benadryl 25-50 mg every 4-6 hours, max dose per day is 300 g. This can cause drowsiness  Follow up with PCP if symptoms persist or worsen  Seek immediate care for    Spreading areas of itching, redness or swelling    Nausea or stomach cramps or abdominal pain    Continuing or recurring symptoms    Spreading areas of redness, swelling, or itching    Signs of infection at the affected site:  ? Spreading redness  ? Increased pain or swelling  ? Fluid or colored drainage from the site  ? Fever of 100.4 F (38 C) or above lasting for 24 to 48 hours, or as directed by your provider    General Allergic Reactions  An allergic reaction is a set of symptoms caused by an allergen. An allergen is something that causes a person s immune system to react. When a person comes in contact with an allergen, it causes the body to release chemicals. These include the chemical histamine. Histamine causes swelling and itching. It may affect the entire body. This is called a general allergic reaction. Often symptoms affect only 1 part of the body. This is called a local allergic reaction.  You are having an allergic reaction. Almost anything can cause one. Different  people are allergic to different things. It is usually something that you ate or swallowed, came into contact with by getting or putting it on your skin or clothes, or something you breathed in the air. This can be very annoying and sometimes scary.  Most of us think of allergic reactions when we have a rash or itchy skin. Symptoms can include:    Itching of the eyes, nose, and roof of the mouth    Runny or stuffy nose    Watery eyes     Sneezing or coughing     A blocked feeling in the ear    Red, itchy rash called hives    Red and purple spots    Rash, redness, welts, blisters    Itching, burning, stinging, pain    Dry, flaky, cracking, scaly skin  Severe symptoms include:    Swelling of the face, lips, or other parts of the body    Hoarse voice    Trouble swallowing, feeling like your throat is closing    Trouble breathing, wheezing    Nausea, vomiting, diarrhea, stomach cramps    Feeling faint or lightheaded, rapid heart rate  Sometimes the cause may be obvious. But there are so many things that can cause a reaction that you may not be able to figure out. The most important things to help find your allergen are:    Remembering when it started    What you were doing at the time or just before that    Any activities you were involved in    Any new products or contacts  Below are some common causes. But remember that almost anything can cause a reaction. You may not even be aware that you came into contact with one of these things:    Dust, mold, pollen    Plants (common ones are poison ivy and poison oak, but there are many others)     Animals    Foods such as shrimp, shellfish, peanuts, milk products, gluten, and eggs. Also food colorings, flavorings, and additives.    Insect bites or stings such as bees, mosquitos, fleas, ticks    Medicines such as penicillin, sulfa medicines, amoxicillin, aspirin, and ibuprofen. But any medicine can cause a reaction.    Jewelry such as nickel or gold. This can be new, or  something you ve worn for a while, including zippers and buttons.    Latex such as in gloves, clothes, toys, balloons, or some tapes. Some people allergic to latex may also have problems with foods like bananas, avocados, kiwi, papaya, or chestnuts.    Lotions, perfumes, cosmetics, soaps, shampoos, skincare products, nail products    Chemicals or dyes in clothing, linen, , hair dyes, soaps, iodine  Many viruses and common colds can cause a rash that is not an allergic reaction. Sometimes it is hard to tell the difference between allergies, sensitivity, or an intolerance to something. This is especially true with food. Many things can cause diarrhea, vomiting, stomach cramps, and skin irritation.  Home care    The goal of treatment is to help relieve the symptoms and get you feeling better. The rash will usually fade over several days. But it can sometimes last a couple of weeks. Over the next couple of days, there may be times when it is gets a little worse, and then better again. Here are some things to do:    If you know what you are allergic to, stay away from it. Future reactions could be worse than this one.    Avoid tight clothing and anything that heats up your skin (hot showers or baths, direct sunlight). Heat will make itching worse.    An ice pack will relieve local areas of intense itching and redness. To make an ice pack, put ice cubes in a plastic bag that seals at the top. Wrap it in a thin, clean towel. Don t put the ice directly on the skin because it can damage the skin.    Oral diphenhydramine is an over-the-counter antihistamine sold at pharmacy and grocery stores. Unless a prescription antihistamine was given, diphenhydramine may be used to reduce itching if large areas of the skin are involved. It may make you sleepy. So be careful using it in the daytime or when going to school, working, or driving. Note: Don t use diphenhydramine if you have glaucoma or if you are a man with trouble  urinating due to an enlarged prostate. There are other antihistamines that won t make you so sleepy. These are good choices for daytime use. Ask your pharmacist for suggestions.    Don t use diphenhydramine cream on your skin. It can cause a further reaction in some people.    To help prevent an infection, don't scratch the affected area. Scratching may worsen the reaction and damage your skin. It can also lead to an infection. Always check the affected for signs of an infection.    Call your healthcare provider and ask what you can use to help decrease the itching.    To decrease allergic reactions, try the following:      Use heat-steam to clean your home    Use high-efficiency particulate (HEPA) vacuums and filters    Stay away from food and pet triggers    Kill any cockroaches    Clean your house often  Follow-up care  Follow up with your healthcare provider, or as advised. If you had a severe reaction today, or if you have had several mild to medium allergic reactions in the past, ask your provider about allergy testing. This can help you find out what you are allergic to. If your reaction included dizziness, fainting, or trouble breathing or swallowing, ask your provider about carrying auto-injectable epinephrine.  Call 911  Call 911 if any of these occur:    Trouble breathing or swallowing, wheezing    Cool, moist, pale skin    Shortness of breath    Hoarse voice or trouble speaking    Confused     Very drowsy or trouble awakening    Fainting or loss of consciousness    Rapid heart rate    Feeling of dizziness or weakness or a sudden drop in blood pressure    Feeling of doom    Feeling lightheaded    Severe nausea or vomiting, or diarrhea    Seizure    Swelling in the face, eyelids, lips, mouth, throat or tongue    Drooling  When to seek medical advice  Call your healthcare provider right away if any of these occur:    Spreading areas of itching, redness or swelling    Nausea or stomach cramps or abdominal  pain    Continuing or recurring symptoms    Spreading areas of redness, swelling, or itching    Signs of infection at the affected site:  ? Spreading redness  ? Increased pain or swelling  ? Fluid or colored drainage from the site  ? Fever of 100.4 F (38 C) or above lasting for 24 to 48 hours, or as directed by your provider  Date Last Reviewed: 3/1/2017    1506-3921 [employer]. [address] [place] [address]. All rights reserved. This information is not intended as a substitute for professional medical care. Always follow your healthcare professional's instructions.                Follow-ups after your visit        Follow-up notes from your care team     Return if symptoms worsen or fail to improve.      Your next 10 appointments already scheduled     May 11, 2018 10:15 AM CDT   Return Visit with Gerald Martínez DO   [facility]    [address]  [place] [id number] [phone]              Who to contact     If you have questions or need follow up information about today's clinic visit or your schedule please contact [facility] directly at [phone].  Normal or non-critical lab and imaging results will be communicated to you by GreenFuelhart, letter or phone within 4 business days after the clinic has received the results. If you do not hear from us within 7 days, please contact the clinic through GreenFuelhart or phone. If you have a critical or abnormal lab result, we will notify you by phone as soon as possible.  Submit refill requests through [employer] or call your pharmacy and they will forward the refill request to us. Please allow 3 business days for your refill to be completed.          Additional Information About Your Visit        MyChart Information     [employer] gives you secure access to your electronic health record. If you see a primary care provider, you can also send messages to your care  team and make appointments. If you have questions, please call your primary care clinic.  If you do not have a primary care provider, please call [phone] and they will assist you.        Care EveryWhere ID     This is your Care EveryWhere ID. This could be used by other organizations to access your [place] medical records  [id number]        Your Vitals Were     Pulse Temperature Respirations BMI (Body Mass Index)          76 98.6  F (37  C) (Tympanic) 16 29.16 kg/m2         Blood Pressure from Last 3 Encounters:   05/08/18 150/82   04/25/18 129/66   04/09/18 122/76    Weight from Last 3 Encounters:   05/08/18 162 lb (73.5 kg)   04/09/18 164 lb 3.2 oz (74.5 kg)   04/02/18 160 lb (72.6 kg)              Today, you had the following     No orders found for display         Today's Medication Changes          These changes are accurate as of 5/8/18  1:33 PM.  If you have any questions, ask your nurse or doctor.               Start taking these medicines.        Dose/Directions    triamcinolone 0.025 % ointment   Commonly known as:  KENALOG   Used for:  Rash   Started by:  Dora Rosado PA        Apply smallest effective dose to affected area only for 7-14 days.   Quantity:  15 g   Refills:  0            Where to get your medicines      These medications were sent to [id number] - [place], [place] - [address]  [address], [facility] [address]     Phone:  [phone]     triamcinolone 0.025 % ointment                Primary Care Provider Office Phone # Fax #    Daniel Beyer [phone] [address] [facility] [address]        Equal Access to Services     DENISE CIFUENTES : Inder Villagran, brijesh hook, evelina little, coby kay. So [facility] [phone].    ATENCIÓN: Si habla español, tiene a echeverria disposición servicios gratuitos de asistencia lingüística. Llame al  [phone].    We comply with applicable federal civil rights laws and [place] laws. We do not discriminate on the basis of race, color, national origin, age, disability, sex, sexual orientation, or gender identity.            Thank you!     Thank you for choosing [facility] AND [facility]  for your care. Our goal is always to provide you with excellent care. Hearing back from our patients is one way we can continue to improve our services. Please take a few minutes to complete the written survey that you may receive in the mail after your visit with us. Thank you!             Your Updated Medication List - Protect others around you: Learn how to safely use, store and throw away your medicines at www.disposemymeds.org.          This list is accurate as of 5/8/18  1:33 PM.  Always use your most recent med list.                   Brand Name Dispense Instructions for use Diagnosis    esomeprazole 40 MG CR capsule    nexIUM     Take 20 mg by mouth every morning (before breakfast)        HYDROcodone-acetaminophen 5-325 MG per tablet    NORCO    5 tablet    Take 1 tablet by mouth every 4 hours as needed for other (Moderate to Severe Pain)    History of carpal tunnel surgery       metoprolol succinate 50 MG 24 hr tablet    TOPROL-XL     25 mg 2 times daily        mirtazapine 15 MG tablet    REMERON          triamcinolone 0.025 % ointment    KENALOG    15 g    Apply smallest effective dose to affected area only for 7-14 days.    Rash       venlafaxine 37.5 MG 24 hr capsule    EFFEXOR-XR     37.5 mg 2 times daily

## 2018-05-11 ENCOUNTER — OFFICE VISIT (OUTPATIENT)
Dept: ORTHOPEDICS | Facility: OTHER | Age: 83
End: 2018-05-11
Attending: ORTHOPAEDIC SURGERY
Payer: MEDICARE

## 2018-05-11 VITALS
HEIGHT: 63 IN | DIASTOLIC BLOOD PRESSURE: 70 MMHG | BODY MASS INDEX: 28.7 KG/M2 | SYSTOLIC BLOOD PRESSURE: 148 MMHG | WEIGHT: 162 LBS | TEMPERATURE: 98.2 F

## 2018-05-11 DIAGNOSIS — Z98.890 HISTORY OF CARPAL TUNNEL SURGERY: Primary | ICD-10-CM

## 2018-05-11 PROCEDURE — 99024 POSTOP FOLLOW-UP VISIT: CPT | Performed by: ORTHOPAEDIC SURGERY

## 2018-05-11 PROCEDURE — G0463 HOSPITAL OUTPT CLINIC VISIT: HCPCS | Mod: 25

## 2018-05-11 ASSESSMENT — PAIN SCALES - GENERAL: PAINLEVEL: NO PAIN (0)

## 2018-05-11 NOTE — NURSING NOTE
Patient is here for a post op of her right CTR.  DOS: 4/25/18    Leana Haney LPN .......5/11/2018 10:10 AM

## 2018-05-11 NOTE — PROGRESS NOTES
"PROGRESS NOTE    SUBJECTIVE:  Mervat Lockett is here for evaluation in regards to right carpal tunnel release.  Overall she is doing very well.    OBJECTIVE:  /70 (BP Location: Right arm, Patient Position: Sitting, Cuff Size: Adult Regular)  Temp 98.2  F (36.8  C) (Tympanic)  Ht 1.588 m (5' 2.5\")  Wt 73.5 kg (162 lb)  BMI 29.16 kg/m2 Body mass index is 29.16 kg/(m^2).    General Appearance: Pleasant 86 year old female in good appearance, mood and affect.  Alert and orientated times three ( time, date and location).    Incision:  Clean, dry, and intact.    Wrist:  Motion: Some stiffness.    Hand:  Sensation: Still decreased sensation in the both hands median nerve distribution really no changes since her surgery.  Radial and ulnar blood flow: Good blood flow.    Elbow:  Motion: Full motion.    Shoulder:  Motion: Full motion.    Eyes: Pupils are round.    Ears: Hearing: Intact.    Heart: Good capillary refill into her hands pulses are regular.    Lungs: Clear.    IMPRESSION:    POSTOPERATIVE DIAGNOSIS:  Bilateral carpal tunnel syndrome, right greater than left.       PROCEDURE:     1.  Right carpal tunnel release (DOS 04/25/2018).   2.  Left carpal tunnel syndrome.     PLAN:    Suture removal and steri strip.  Will get a wrist splint to wear for next 2 weeks and can remove to shower, then ok to leave off for normal activities.  She was instructed on being careful over the next 2 weeks and she has a 2 pound weight restriction.  If she has any other problems she will let me know.  Questions and concerns answered.    Gerald Martínez D.O.  Orthopaedic Surgeon    [facility] [place] [address] [address] [place] [address]  Phone [phone] (KNEE)  Fax [phone]    Disclaimer:  This note consists of words and symbols derived from keyboarding, dictation, or using voice recognition software. As a result, there may be errors in the script that have gone undetected. Please consider this " when interpreting information found in this note.    10:44 AM 5/11/2018

## 2018-05-11 NOTE — MR AVS SNAPSHOT
"              After Visit Summary   5/11/2018    Mervat Lockett    MRN: [id number]           Patient Information     Date Of Birth          1/2/1932        Visit Information        Provider Department      5/11/2018 10:15 AM Gerald Martínez DO [facility]        Today's Diagnoses     S/P right carpal tunnel surgery    -  1       Follow-ups after your visit        Follow-up notes from your care team     Return if symptoms worsen or fail to improve.      Who to contact     If you have questions or need follow up information about today's clinic visit or your schedule please contact [facility] AND [facility] directly at [phone].  Normal or non-critical lab and imaging results will be communicated to you by NetMinderhart, letter or phone within 4 business days after the clinic has received the results. If you do not hear from us within 7 days, please contact the clinic through NetMinderhart or phone. If you have a critical or abnormal lab result, we will notify you by phone as soon as possible.  Submit refill requests through [employer] or call your pharmacy and they will forward the refill request to us. Please allow 3 business days for your refill to be completed.          Additional Information About Your Visit        MyChart Information     [employer] gives you secure access to your electronic health record. If you see a primary care provider, you can also send messages to your care team and make appointments. If you have questions, please call your primary care clinic.  If you do not have a primary care provider, please call [phone] and they will assist you.        Care EveryWhere ID     This is your Care EveryWhere ID. This could be used by other organizations to access your [place] medical records  [id number]        Your Vitals Were     Temperature Height BMI (Body Mass Index)             98.2  F (36.8  C) (Tympanic) 1.588 m (5' 2.5\") 29.16 kg/m2          Blood Pressure from Last 3 " Encounters:   05/11/18 148/70   05/08/18 150/82   04/25/18 129/66    Weight from Last 3 Encounters:   05/11/18 73.5 kg (162 lb)   05/08/18 73.5 kg (162 lb)   04/09/18 74.5 kg (164 lb 3.2 oz)              Today, you had the following     No orders found for display       Primary Care Provider Office Phone # Fax #    Daniel Beyer [phone]       [address] [facility] [address]        Equal Access to Services     [facility]: Hadii aad ku hadasho Soomaali, waaxda luqadaha, qaybta kaalmada sidney, coby feliciano . So [facility] [place] [facility] [phone].    ATENCIÓN: Si habla español, tiene a echeverria disposición servicios gratuitos de asistencia lingüística. [facility] [phone].    We comply with applicable federal civil rights laws and [place] laws. We do not discriminate on the basis of race, color, national origin, age, disability, sex, sexual orientation, or gender identity.            Thank you!     Thank you for choosing [facility] AND \[facility]\""  for your care. Our goal is always to provide you with excellent care. Hearing back from our patients is one way we can continue to improve our services. Please take a few minutes to complete the written survey that you may receive in the mail after your visit with us. Thank you!             Your Updated Medication List - Protect others around you: Learn how to safely use, store and throw away your medicines at www.disposemymeds.org.          This list is accurate as of 5/11/18 10:46 AM.  Always use your most recent med list.                   Brand Name Dispense Instructions for use Diagnosis    esomeprazole 40 MG CR capsule    nexIUM     Take 20 mg by mouth every morning (before breakfast)        metoprolol succinate 50 MG 24 hr tablet    TOPROL-XL     25 mg 2 times daily        mirtazapine 15 MG tablet    REMERON          triamcinolone 0.025 % ointment    KENALOG    15 g    Apply smallest effective dose to affected  area only for 7-14 days.    Rash       venlafaxine 37.5 MG 24 hr capsule    EFFEXOR-XR     37.5 mg 2 times daily

## 2018-05-22 ENCOUNTER — OFFICE VISIT (OUTPATIENT)
Dept: FAMILY MEDICINE | Facility: OTHER | Age: 83
End: 2018-05-22
Attending: FAMILY MEDICINE
Payer: COMMERCIAL

## 2018-05-22 VITALS
WEIGHT: 162.6 LBS | HEART RATE: 72 BPM | TEMPERATURE: 96.8 F | SYSTOLIC BLOOD PRESSURE: 142 MMHG | BODY MASS INDEX: 29.27 KG/M2 | DIASTOLIC BLOOD PRESSURE: 70 MMHG

## 2018-05-22 DIAGNOSIS — L98.9 SKIN LESION: Primary | ICD-10-CM

## 2018-05-22 DIAGNOSIS — R21 RASH: ICD-10-CM

## 2018-05-22 PROCEDURE — 99213 OFFICE O/P EST LOW 20 MIN: CPT | Performed by: FAMILY MEDICINE

## 2018-05-22 PROCEDURE — G0463 HOSPITAL OUTPT CLINIC VISIT: HCPCS

## 2018-05-22 RX ORDER — TRIAMCINOLONE ACETONIDE 5 MG/G
CREAM TOPICAL
Qty: 30 G | Refills: 1 | Status: SHIPPED | OUTPATIENT
Start: 2018-05-22 | End: 2018-07-09

## 2018-05-22 ASSESSMENT — ANXIETY QUESTIONNAIRES
GAD7 TOTAL SCORE: 0
6. BECOMING EASILY ANNOYED OR IRRITABLE: NOT AT ALL
2. NOT BEING ABLE TO STOP OR CONTROL WORRYING: NOT AT ALL
7. FEELING AFRAID AS IF SOMETHING AWFUL MIGHT HAPPEN: NOT AT ALL
3. WORRYING TOO MUCH ABOUT DIFFERENT THINGS: NOT AT ALL
5. BEING SO RESTLESS THAT IT IS HARD TO SIT STILL: NOT AT ALL
1. FEELING NERVOUS, ANXIOUS, OR ON EDGE: NOT AT ALL

## 2018-05-22 ASSESSMENT — PATIENT HEALTH QUESTIONNAIRE - PHQ9: 5. POOR APPETITE OR OVEREATING: NOT AT ALL

## 2018-05-22 ASSESSMENT — PAIN SCALES - GENERAL: PAINLEVEL: NO PAIN (0)

## 2018-05-22 NOTE — MR AVS SNAPSHOT
After Visit Summary   5/22/2018    Mervat Lockett    MRN: [id number]           Patient Information     Date Of Birth          1/2/1932        Visit Information        Provider Department      5/22/2018 9:45 AM Daniel Beyer MD [facility]        Today's Diagnoses     Skin lesion    -  1    Rash           Follow-ups after your visit        Who to contact     If you have questions or need follow up information about today's clinic visit or your schedule please contact [facility] directly at [phone].  Normal or non-critical lab and imaging results will be communicated to you by Vasopharmhart, letter or phone within 4 business days after the clinic has received the results. If you do not hear from us within 7 days, please contact the clinic through InsideTrackt or phone. If you have a critical or abnormal lab result, we will notify you by phone as soon as possible.  Submit refill requests through Wonderswamp or call your pharmacy and they will forward the refill request to us. Please allow 3 business days for your refill to be completed.          Additional Information About Your Visit        MyChart Information     Wonderswamp gives you secure access to your electronic health record. If you see a primary care provider, you can also send messages to your care team and make appointments. If you have questions, please call your primary care clinic.  If you do not have a primary care provider, please call [phone] and they will assist you.        Care EveryWhere ID     This is your Care EveryWhere ID. This could be used by other organizations to access your [facility] medical records  [id number]        Your Vitals Were     Pulse Temperature BMI (Body Mass Index)             72 96.8  F (36  C) (Tympanic) 29.27 kg/m2          Blood Pressure from Last 3 Encounters:   05/22/18 142/70   05/11/18 148/70   05/08/18 150/82    Weight from Last 3 Encounters:   05/22/18 162 lb 9.6  oz (73.8 kg)   05/11/18 162 lb (73.5 kg)   05/08/18 162 lb (73.5 kg)              Today, you had the following     No orders found for display         Today's Medication Changes          These changes are accurate as of 5/22/18 10:07 AM.  If you have any questions, ask your nurse or doctor.               These medicines have changed or have updated prescriptions.        Dose/Directions    * triamcinolone 0.025 % ointment   Commonly known as:  KENALOG   This may have changed:  Another medication with the same name was added. Make sure you understand how and when to take each.   Used for:  Rash   Changed by:  Daniel Beyer MD        Apply smallest effective dose to affected area only for 7-14 days.   Quantity:  15 g   Refills:  0       * triamcinolone 0.5 % cream   Commonly known as:  KENALOG   This may have changed:  You were already taking a medication with the same name, and this prescription was added. Make sure you understand how and when to take each.   Used for:  Rash   Changed by:  Daniel Beyer MD        Apply sparingly to affected area three times daily.   Quantity:  30 g   Refills:  1       * Notice:  This list has 2 medication(s) that are the same as other medications prescribed for you. Read the directions carefully, and ask your doctor or other care provider to review them with you.         Where to get your medicines      These medications were sent to [facility] [address] [place], [place] - [address]  [address], [facility] [address]     Phone:  [phone]     triamcinolone 0.5 % cream                Primary Care Provider Office Phone # Fax #    Daniel Beyer [phone] [address] [facility] [address]        Equal Access to Services     [facility] : Hadii valarie Villagran, brijesh hook, evelina little, coby kay. So [facility] [phone].    ATENCIÓN: Si  collette vences, tiene a echeverria disposición servicios gratuitos de asistencia lingüística. Dave calderon [phone].    We comply with applicable federal civil rights laws and [place] laws. We do not discriminate on the basis of race, color, national origin, age, disability, sex, sexual orientation, or gender identity.            Thank you!     Thank you for choosing [facility] AND [facility]  for your care. Our goal is always to provide you with excellent care. Hearing back from our patients is one way we can continue to improve our services. Please take a few minutes to complete the written survey that you may receive in the mail after your visit with us. Thank you!             Your Updated Medication List - Protect others around you: Learn how to safely use, store and throw away your medicines at www.disposemymeds.org.          This list is accurate as of 5/22/18 10:07 AM.  Always use your most recent med list.                   Brand Name Dispense Instructions for use Diagnosis    esomeprazole 40 MG CR capsule    nexIUM     Take 20 mg by mouth every morning (before breakfast)        metoprolol succinate 50 MG 24 hr tablet    TOPROL-XL     25 mg 2 times daily        mirtazapine 15 MG tablet    REMERON          * triamcinolone 0.025 % ointment    KENALOG    15 g    Apply smallest effective dose to affected area only for 7-14 days.    Rash       * triamcinolone 0.5 % cream    KENALOG    30 g    Apply sparingly to affected area three times daily.    Rash       venlafaxine 37.5 MG 24 hr capsule    EFFEXOR-XR     37.5 mg 2 times daily        * Notice:  This list has 2 medication(s) that are the same as other medications prescribed for you. Read the directions carefully, and ask your doctor or other care provider to review them with you.

## 2018-05-22 NOTE — PROGRESS NOTES
SUBJECTIVE:   Mervat Lockett is a 86 year old female who presents to clinic today for the following health issues:  Has noticed a spot on her nose for some time/ It is large and growing/ I suggest shave bx of small area to be safe. She also has a red dry rash on upper chest.. And forearms. Will stop dryer sheets and give steroid cream as this looks to be exzematous/ rec she not scratch            Problem list and histories reviewed & adjusted, as indicated.  Additional history: as documented        Reviewed and updated as needed this visit by clinical staff  Tobacco  Allergies  Meds  Med Hx  Surg Hx  Fam Hx  Soc Hx      Reviewed and updated as needed this visit by Provider         OBJECTIVE:     /70 (BP Location: Right arm, Patient Position: Sitting, Cuff Size: Adult Regular)  Pulse 72  Temp 96.8  F (36  C) (Tympanic)  Wt 162 lb 9.6 oz (73.8 kg)  BMI 29.27 kg/m2  Body mass index is 29.27 kg/(m^2).  GENERAL: healthy, alert and no distress        ASSESSMENT/PLAN:       1. Skin lesion  Will schedule for bx    2. Rash    - triamcinolone (KENALOG) 0.5 % cream; Apply sparingly to affected area three times daily.  Dispense: 30 g; Refill: 1    See Patient Instructions    FLEX LEAL MD  [facility]

## 2018-05-22 NOTE — NURSING NOTE
Patient presents in the clinic with concerns of a rash on her chest and arms that appeared 5 days ago. Patient denies any pain, but does state it itches. Patient also has a spot on her nose she would like looked at.  Ashley Bedolla LPN 5/22/2018 9:40 AM

## 2018-05-23 ASSESSMENT — ANXIETY QUESTIONNAIRES: GAD7 TOTAL SCORE: 0

## 2018-06-13 ENCOUNTER — TELEPHONE (OUTPATIENT)
Dept: ORTHOPEDICS | Facility: OTHER | Age: 83
End: 2018-06-13

## 2018-06-13 NOTE — TELEPHONE ENCOUNTER
Mervat is having severe L hip pain. They would like Dr. Martínez's opinion on where to go.  To PCP or referral to hip specialist?    Constance Roberts on 6/13/2018 at 8:32 AM

## 2018-06-13 NOTE — TELEPHONE ENCOUNTER
Called patient's  back and he stated that they made an appointment for her to see her primary for her pain since she just had hip x-rays done and they looked good.  If PCP feels that she should go back to [place] for her hip, she will do it then.  Leana Haney LPN .......6/13/2018 12:18 PM

## 2018-06-14 ENCOUNTER — HOSPITAL ENCOUNTER (OUTPATIENT)
Dept: GENERAL RADIOLOGY | Facility: OTHER | Age: 83
Discharge: HOME | End: 2018-06-14
Attending: FAMILY MEDICINE | Admitting: FAMILY MEDICINE
Payer: MEDICARE

## 2018-06-14 ENCOUNTER — OFFICE VISIT (OUTPATIENT)
Dept: FAMILY MEDICINE | Facility: OTHER | Age: 83
End: 2018-06-14
Attending: FAMILY MEDICINE
Payer: MEDICARE

## 2018-06-14 VITALS
HEART RATE: 100 BPM | BODY MASS INDEX: 29.16 KG/M2 | DIASTOLIC BLOOD PRESSURE: 72 MMHG | SYSTOLIC BLOOD PRESSURE: 122 MMHG | WEIGHT: 162 LBS

## 2018-06-14 DIAGNOSIS — M25.552 HIP PAIN, LEFT: Primary | ICD-10-CM

## 2018-06-14 DIAGNOSIS — M25.552 HIP PAIN, LEFT: ICD-10-CM

## 2018-06-14 PROCEDURE — 73502 X-RAY EXAM HIP UNI 2-3 VIEWS: CPT

## 2018-06-14 PROCEDURE — 99213 OFFICE O/P EST LOW 20 MIN: CPT | Performed by: FAMILY MEDICINE

## 2018-06-14 PROCEDURE — G0463 HOSPITAL OUTPT CLINIC VISIT: HCPCS

## 2018-06-14 ASSESSMENT — PAIN SCALES - GENERAL: PAINLEVEL: MODERATE PAIN (4)

## 2018-06-14 NOTE — PROGRESS NOTES
SUBJECTIVE:   Mervat Lockett is a 86 year old female who presents to clinic today for the following health issues:  Comes in w hx that 10 days she was walking down a hil to her cabin and then steps up the step at the cabin and felt immediate pain in her L hip/ buttocks and down the leg some. She has hurt some w activity butit does not hurt when at rest; no affect on bowel nor bladder            Problem list and histories reviewed & adjusted, as indicated.  Additional history: as documented        Reviewed and updated as needed this visit by clinical staff  Tobacco  Allergies  Meds  Soc Hx      Reviewed and updated as needed this visit by Provider             OBJECTIVE:     /72  Pulse 100  Wt 162 lb (73.5 kg)  Breastfeeding? No  BMI 29.16 kg/m2  Body mass index is 29.16 kg/(m^2).  GENERAL: healthy, alert and no distress  MS: tender tomovement around L hip/ buttocks area/ xray neg for acute injury    ASSESSMENT/PLAN:         1. Hip pain, left  Ice/ tylenol / keep active/ PT offered but declined  - XR Pelvis and Hip Left 1 View; Future     See Patient Instructions    FLEX LEAL MD  [facility]

## 2018-06-14 NOTE — MR AVS SNAPSHOT
After Visit Summary   6/14/2018    Mervat Lockett    MRN: [id number]           Patient Information     Date Of Birth          1/2/1932        Visit Information        Provider Department      6/14/2018 2:45 PM Daniel Beyer MD [facility]        Today's Diagnoses     Hip pain, left    -  1       Follow-ups after your visit        Future tests that were ordered for you today     Open Future Orders        Priority Expected Expires Ordered    XR Pelvis and Hip Left 1 View Routine 6/14/2018 6/14/2019 6/14/2018            Who to contact     If you have questions or need follow up information about today's clinic visit or your schedule please contact [facility] directly at [phone].  Normal or non-critical lab and imaging results will be communicated to you by Zoodleshart, letter or phone within 4 business days after the clinic has received the results. If you do not hear from us within 7 days, please contact the clinic through CarePoint Solutionst or phone. If you have a critical or abnormal lab result, we will notify you by phone as soon as possible.  Submit refill requests through Doodle or call your pharmacy and they will forward the refill request to us. Please allow 3 business days for your refill to be completed.          Additional Information About Your Visit        MyChart Information     Doodle gives you secure access to your electronic health record. If you see a primary care provider, you can also send messages to your care team and make appointments. If you have questions, please call your primary care clinic.  If you do not have a primary care provider, please call [phone] and they will assist you.        Care EveryWhere ID     This is your Care EveryWhere ID. This could be used by other organizations to access your [facility] medical records  [id number]        Your Vitals Were     Pulse Breastfeeding? BMI (Body Mass Index)             100 No 29.16  kg/m2          Blood Pressure from Last 3 Encounters:   06/14/18 122/72   05/22/18 142/70   05/11/18 148/70    Weight from Last 3 Encounters:   06/14/18 162 lb (73.5 kg)   05/22/18 162 lb 9.6 oz (73.8 kg)   05/11/18 162 lb (73.5 kg)               Primary Care Provider Office Phone # Fax #    Daniel Beyer [phone]       [address] [facility] [address]        Equal Access to Services     [facility]: Hadii aad ku hadasho Soomaali, waaxda luqadaha, qaybta kaalmada adeegyabren, coby feliciano . So [facility] [phone].    ATENCIÓN: Si habla español, tiene a echeverria disposición servicios gratuitos de asistencia lingüística. [facility] [phone].    We comply with applicable federal civil rights laws and [place] laws. We do not discriminate on the basis of race, color, national origin, age, disability, sex, sexual orientation, or gender identity.            Thank you!     Thank you for choosing [facility] AND hospitals  for your care. Our goal is always to provide you with excellent care. Hearing back from our patients is one way we can continue to improve our services. Please take a few minutes to complete the written survey that you may receive in the mail after your visit with us. Thank you!             Your Updated Medication List - Protect others around you: Learn how to safely use, store and throw away your medicines at www.disposemymeds.org.          This list is accurate as of 6/14/18  4:02 PM.  Always use your most recent med list.                   Brand Name Dispense Instructions for use Diagnosis    esomeprazole 40 MG CR capsule    nexIUM     Take 20 mg by mouth every morning (before breakfast)        metoprolol succinate 50 MG 24 hr tablet    TOPROL-XL     25 mg 2 times daily        mirtazapine 15 MG tablet    REMERON          * triamcinolone 0.025 % ointment    KENALOG    15 g    Apply smallest effective dose to affected area only for 7-14  days.    Rash       * triamcinolone 0.5 % cream    KENALOG    30 g    Apply sparingly to affected area three times daily.    Rash       venlafaxine 37.5 MG 24 hr capsule    EFFEXOR-XR     37.5 mg 2 times daily        * Notice:  This list has 2 medication(s) that are the same as other medications prescribed for you. Read the directions carefully, and ask your doctor or other care provider to review them with you.

## 2018-06-14 NOTE — NURSING NOTE
Patient presents for severe leg pain on the left side.   Irlanda Natarajan LPN........................6/14/2018  2:51 PM

## 2018-06-18 NOTE — ADDENDUM NOTE
Encounter addended by: Brayden Oneil, PT on: 6/18/2018  4:55 PM<BR>     Actions taken: Sign clinical note, Episode resolved

## 2018-06-18 NOTE — PROGRESS NOTES
Outpatient Physical Therapy Discharge Note     Patient: Mervat Lockett  : 1932    Beginning/End Dates of Reporting Period:  3/28/2018 to 2018    Referring Provider: Barbi Bergman Diagnosis: Impaired mobility, decreased strength, decreased endurance, impaired balance     Client Self Report: Patient is more stiff today. She has little pain in the hip but says her back is slightly sore. She continues to report that she sits and relaxes more than she should.  Gets her exercises in when she thinks about them    *This is subjective information from her last visit.     Objective Measurements:  Objective Measure: Pain rating  Details: 4/10 in the low back    Goals:  Goal Identifier HEP   Goal Description Patient will demonstrate independence and compliance with her HEP in order to improve her overall strength and endurance   Target Date 18   Date Met      Progress:     Goal Identifier Stairs   Goal Description Patient will be able to ascend/descend 1 flight of stairs with minimal to no pain in order to improve her overall function.    Target Date 18   Date Met      Progress:     Goal Identifier Dressing   Goal Description Patient will demonstrate ability to complete all LE dressing with minimal to no pain in order to improve her efficiency with ADL's.    Target Date 18   Date Met      Progress:     Goal Identifier Mobility   Goal Description Patient will be able to ambulate for longer than 10 minutes with minimal to no pain in order to improve safety and efficiency with community ambulation   Target Date 18   Date Met      Progress:       Progress Toward Goals:   Patient did not attend her final visit. Unable to assess progress towards goals and discharge was unplanned.     Plan:  Discharge from therapy.    Discharge:    Reason for Discharge: Medicare G-code: Patient did not attend a final scheduled session prior to discharge. Unable to determine discharge functional  status.    Equipment Issued: none    Discharge Plan: Patient to continue home program.

## 2018-07-03 ENCOUNTER — OFFICE VISIT (OUTPATIENT)
Dept: FAMILY MEDICINE | Facility: OTHER | Age: 83
End: 2018-07-03
Attending: FAMILY MEDICINE
Payer: COMMERCIAL

## 2018-07-03 VITALS
SYSTOLIC BLOOD PRESSURE: 140 MMHG | WEIGHT: 162 LBS | TEMPERATURE: 98.6 F | HEART RATE: 88 BPM | BODY MASS INDEX: 29.16 KG/M2 | DIASTOLIC BLOOD PRESSURE: 72 MMHG

## 2018-07-03 DIAGNOSIS — R10.2 PELVIC PAIN IN FEMALE: Primary | ICD-10-CM

## 2018-07-03 PROCEDURE — G0463 HOSPITAL OUTPT CLINIC VISIT: HCPCS

## 2018-07-03 PROCEDURE — 99213 OFFICE O/P EST LOW 20 MIN: CPT | Performed by: FAMILY MEDICINE

## 2018-07-03 RX ORDER — PREDNISONE 20 MG/1
TABLET ORAL
Qty: 18 TABLET | Refills: 0 | Status: SHIPPED | OUTPATIENT
Start: 2018-07-03 | End: 2018-07-16

## 2018-07-03 ASSESSMENT — PAIN SCALES - GENERAL: PAINLEVEL: SEVERE PAIN (7)

## 2018-07-03 NOTE — MR AVS SNAPSHOT
After Visit Summary   7/3/2018    Mervat Lockett    MRN: [id number]           Patient Information     Date Of Birth          1/2/1932        Visit Information        Provider Department      7/3/2018 2:15 PM Daniel Beyer MD [facility]        Today's Diagnoses     Pelvic pain in female    -  1       Follow-ups after your visit        Who to contact     If you have questions or need follow up information about today's clinic visit or your schedule please contact [facility] directly at [phone].  Normal or non-critical lab and imaging results will be communicated to you by Eyeviewhart, letter or phone within 4 business days after the clinic has received the results. If you do not hear from us within 7 days, please contact the clinic through Imonomy Interactive or phone. If you have a critical or abnormal lab result, we will notify you by phone as soon as possible.  Submit refill requests through Imonomy Interactive or call your pharmacy and they will forward the refill request to us. Please allow 3 business days for your refill to be completed.          Additional Information About Your Visit        MyChart Information     Imonomy Interactive gives you secure access to your electronic health record. If you see a primary care provider, you can also send messages to your care team and make appointments. If you have questions, please call your primary care clinic.  If you do not have a primary care provider, please call [phone] and they will assist you.        Care EveryWhere ID     This is your Care EveryWhere ID. This could be used by other organizations to access your [place] medical records  [id number]        Your Vitals Were     Pulse Temperature BMI (Body Mass Index)             88 98.6  F (37  C) (Tympanic) 29.16 kg/m2          Blood Pressure from Last 3 Encounters:   07/03/18 140/72   06/14/18 122/72   05/22/18 142/70    Weight from Last 3 Encounters:   07/03/18 162 lb (73.5 kg)    06/14/18 162 lb (73.5 kg)   05/22/18 162 lb 9.6 oz (73.8 kg)              Today, you had the following     No orders found for display         Today's Medication Changes          These changes are accurate as of 7/3/18  2:44 PM.  If you have any questions, ask your nurse or doctor.               Start taking these medicines.        Dose/Directions    predniSONE 20 MG tablet   Commonly known as:  DELTASONE   Used for:  Pelvic pain in female   Started by:  Daniel Beyer MD        3 daily for 3 days; then 2 daily for 3 days; then 1 daily for 3 days with food   Quantity:  18 tablet   Refills:  0            Where to get your medicines      These medications were sent to [id number] - [place], [place] - [address] AT [address]  [address], [facility] [address]     Phone:  [phone]     predniSONE 20 MG tablet                Primary Care Provider Office Phone # Fax #    Daniel Beyer [phone]       [address] [facility] [address]        Equal Access to Services     [facility]: Hadii aad ku hadasho Soomaali, waaxda luqadaha, qaybta kaalmada adeegyada, coby feliciano . So [facility] [phone].    ATENCIÓN: Si habla español, tiene a echeverria disposición servicios gratuitos de asistencia lingüística. Llame al [phone].    We comply with applicable federal civil rights laws and [place] laws. We do not discriminate on the basis of race, color, national origin, age, disability, sex, sexual orientation, or gender identity.            Thank you!     Thank you for choosing [facility] AND [facility]  for your care. Our goal is always to provide you with excellent care. Hearing back from our patients is one way we can continue to improve our services. Please take a few minutes to complete the written survey that you may receive in the mail after your visit with us. Thank you!             Your Updated Medication List -  Protect others around you: Learn how to safely use, store and throw away your medicines at www.disposemymeds.org.          This list is accurate as of 7/3/18  2:44 PM.  Always use your most recent med list.                   Brand Name Dispense Instructions for use Diagnosis    esomeprazole 40 MG CR capsule    nexIUM     Take 20 mg by mouth every morning (before breakfast)        metoprolol succinate 50 MG 24 hr tablet    TOPROL-XL     25 mg 2 times daily        mirtazapine 15 MG tablet    REMERON          predniSONE 20 MG tablet    DELTASONE    18 tablet    3 daily for 3 days; then 2 daily for 3 days; then 1 daily for 3 days with food    Pelvic pain in female       * triamcinolone 0.025 % ointment    KENALOG    15 g    Apply smallest effective dose to affected area only for 7-14 days.    Rash       * triamcinolone 0.5 % cream    KENALOG    30 g    Apply sparingly to affected area three times daily.    Rash       venlafaxine 37.5 MG 24 hr capsule    EFFEXOR-XR     37.5 mg 2 times daily        * Notice:  This list has 2 medication(s) that are the same as other medications prescribed for you. Read the directions carefully, and ask your doctor or other care provider to review them with you.

## 2018-07-03 NOTE — NURSING NOTE
Patient presents to the clinic for follow up on lower back, leg and hip pain. She has used ice and has taken tylenol and aspirin for relief.  Jaz VELARDE CMA.......7/3/2018..2:17 PM

## 2018-07-03 NOTE — PROGRESS NOTES
SUBJECTIVE:   Mervta Lockett is a 86 year old female who presents to clinic today for the following health issues:  Has buttocks pain since an episode 3 weeks ago when seh had gone down a steep hill and then stepped up on a step. Pelvic imaging neg for acute injury. Sh edoes have some occasional numbness down into R leg            Problem list and histories reviewed & adjusted, as indicated.  Additional history: as documented        Reviewed and updated as needed this visit by clinical staff  Tobacco  Allergies  Meds  Med Hx  Surg Hx  Fam Hx  Soc Hx      Reviewed and updated as needed this visit by Provider             OBJECTIVE:     /72 (BP Location: Right arm, Patient Position: Sitting, Cuff Size: Adult Regular)  Pulse 88  Temp 98.6  F (37  C) (Tympanic)  Wt 162 lb (73.5 kg)  BMI 29.16 kg/m2  Body mass index is 29.16 kg/(m^2).  GENERAL: healthy, alert and no distress  MS: no gross musculoskeletal defects noted, no edema; good heel/ toe walking w symmetrical DTRs        ASSESSMENT/PLAN:         1. Pelvic pain in female  This could be radicular pain / prednisone rx / recehck        See Patient Instructions    FLEX LEAL MD  [facility] AND [facility]

## 2018-07-09 ENCOUNTER — HOSPITAL ENCOUNTER (EMERGENCY)
Facility: OTHER | Age: 83
Discharge: HOME | End: 2018-07-09
Attending: PHYSICIAN ASSISTANT | Admitting: PHYSICIAN ASSISTANT
Payer: MEDICARE

## 2018-07-09 ENCOUNTER — APPOINTMENT (OUTPATIENT)
Dept: CT IMAGING | Facility: OTHER | Age: 83
End: 2018-07-09
Attending: PHYSICIAN ASSISTANT
Payer: MEDICARE

## 2018-07-09 VITALS
RESPIRATION RATE: 16 BRPM | DIASTOLIC BLOOD PRESSURE: 79 MMHG | OXYGEN SATURATION: 95 % | SYSTOLIC BLOOD PRESSURE: 172 MMHG | TEMPERATURE: 97.8 F | HEART RATE: 80 BPM

## 2018-07-09 DIAGNOSIS — R07.89 ATYPICAL CHEST PAIN: ICD-10-CM

## 2018-07-09 DIAGNOSIS — J98.11 ATELECTASIS, BILATERAL: ICD-10-CM

## 2018-07-09 DIAGNOSIS — F41.0 ANXIETY ATTACK: ICD-10-CM

## 2018-07-09 LAB
ALBUMIN SERPL-MCNC: 3.7 G/DL (ref 3.5–5.7)
ALP SERPL-CCNC: 177 U/L (ref 34–104)
ALT SERPL W P-5'-P-CCNC: 11 U/L (ref 7–52)
ANION GAP SERPL CALCULATED.3IONS-SCNC: 8 MMOL/L (ref 3–14)
AST SERPL W P-5'-P-CCNC: 9 U/L (ref 13–39)
BASOPHILS # BLD AUTO: 0 10E9/L (ref 0–0.2)
BASOPHILS NFR BLD AUTO: 0.2 %
BILIRUB SERPL-MCNC: 0.3 MG/DL (ref 0.3–1)
BUN SERPL-MCNC: 31 MG/DL (ref 7–25)
CALCIUM SERPL-MCNC: 9.2 MG/DL (ref 8.6–10.3)
CHLORIDE SERPL-SCNC: 106 MMOL/L (ref 98–107)
CO2 SERPL-SCNC: 26 MMOL/L (ref 21–31)
CREAT SERPL-MCNC: 0.91 MG/DL (ref 0.6–1.2)
D DIMER PPP DDU-MCNC: 443 NG/ML D-DU (ref 0–230)
DIFFERENTIAL METHOD BLD: ABNORMAL
EOSINOPHIL # BLD AUTO: 0.2 10E9/L (ref 0–0.7)
EOSINOPHIL NFR BLD AUTO: 1.8 %
ERYTHROCYTE [DISTWIDTH] IN BLOOD BY AUTOMATED COUNT: 16.9 % (ref 10–15)
ERYTHROCYTE [SEDIMENTATION RATE] IN BLOOD BY WESTERGREN METHOD: 7 MM/H (ref 1–15)
GFR SERPL CREATININE-BSD FRML MDRD: 59 ML/MIN/1.7M2
GLUCOSE SERPL-MCNC: 114 MG/DL (ref 70–105)
HCT VFR BLD AUTO: 36.6 % (ref 35–47)
HGB BLD-MCNC: 10.9 G/DL (ref 11.7–15.7)
IMM GRANULOCYTES # BLD: 0.2 10E9/L (ref 0–0.4)
IMM GRANULOCYTES NFR BLD: 1.5 %
INR PPP: 0.9 (ref 0–1.3)
LYMPHOCYTES # BLD AUTO: 2.4 10E9/L (ref 0.8–5.3)
LYMPHOCYTES NFR BLD AUTO: 19.8 %
MCH RBC QN AUTO: 24.2 PG (ref 26.5–33)
MCHC RBC AUTO-ENTMCNC: 29.8 G/DL (ref 31.5–36.5)
MCV RBC AUTO: 81 FL (ref 78–100)
MONOCYTES # BLD AUTO: 1.1 10E9/L (ref 0–1.3)
MONOCYTES NFR BLD AUTO: 8.6 %
NEUTROPHILS # BLD AUTO: 8.4 10E9/L (ref 1.6–8.3)
NEUTROPHILS NFR BLD AUTO: 68.1 %
PLATELET # BLD AUTO: 214 10E9/L (ref 150–450)
POTASSIUM SERPL-SCNC: 3.9 MMOL/L (ref 3.5–5.1)
PROT SERPL-MCNC: 6.8 G/DL (ref 6.4–8.9)
RBC # BLD AUTO: 4.51 10E12/L (ref 3.8–5.2)
SODIUM SERPL-SCNC: 140 MMOL/L (ref 134–144)
TROPONIN I SERPL-MCNC: <0.03 UG/L (ref 0–0.03)
WBC # BLD AUTO: 12.3 10E9/L (ref 4–11)

## 2018-07-09 PROCEDURE — 93010 ELECTROCARDIOGRAM REPORT: CPT | Performed by: INTERNAL MEDICINE

## 2018-07-09 PROCEDURE — 80053 COMPREHEN METABOLIC PANEL: CPT | Performed by: PHYSICIAN ASSISTANT

## 2018-07-09 PROCEDURE — 99284 EMERGENCY DEPT VISIT MOD MDM: CPT | Mod: Z6 | Performed by: PHYSICIAN ASSISTANT

## 2018-07-09 PROCEDURE — 25000132 ZZH RX MED GY IP 250 OP 250 PS 637: Mod: GY | Performed by: PHYSICIAN ASSISTANT

## 2018-07-09 PROCEDURE — 85610 PROTHROMBIN TIME: CPT | Performed by: PHYSICIAN ASSISTANT

## 2018-07-09 PROCEDURE — 36415 COLL VENOUS BLD VENIPUNCTURE: CPT | Performed by: PHYSICIAN ASSISTANT

## 2018-07-09 PROCEDURE — 84484 ASSAY OF TROPONIN QUANT: CPT | Performed by: PHYSICIAN ASSISTANT

## 2018-07-09 PROCEDURE — 25000128 H RX IP 250 OP 636: Performed by: PHYSICIAN ASSISTANT

## 2018-07-09 PROCEDURE — A9270 NON-COVERED ITEM OR SERVICE: HCPCS | Mod: GY | Performed by: PHYSICIAN ASSISTANT

## 2018-07-09 PROCEDURE — 85379 FIBRIN DEGRADATION QUANT: CPT | Performed by: PHYSICIAN ASSISTANT

## 2018-07-09 PROCEDURE — 99285 EMERGENCY DEPT VISIT HI MDM: CPT | Mod: 25 | Performed by: PHYSICIAN ASSISTANT

## 2018-07-09 PROCEDURE — 85652 RBC SED RATE AUTOMATED: CPT | Performed by: PHYSICIAN ASSISTANT

## 2018-07-09 PROCEDURE — 93005 ELECTROCARDIOGRAM TRACING: CPT | Performed by: PHYSICIAN ASSISTANT

## 2018-07-09 PROCEDURE — 71260 CT THORAX DX C+: CPT

## 2018-07-09 PROCEDURE — 85025 COMPLETE CBC W/AUTO DIFF WBC: CPT | Performed by: PHYSICIAN ASSISTANT

## 2018-07-09 RX ORDER — LORAZEPAM 1 MG/1
1 TABLET ORAL ONCE
Status: COMPLETED | OUTPATIENT
Start: 2018-07-09 | End: 2018-07-09

## 2018-07-09 RX ORDER — IODIXANOL 320 MG/ML
100 INJECTION, SOLUTION INTRAVASCULAR ONCE
Status: COMPLETED | OUTPATIENT
Start: 2018-07-09 | End: 2018-07-09

## 2018-07-09 RX ORDER — LORAZEPAM 0.5 MG/1
0.5 TABLET ORAL EVERY 6 HOURS PRN
Qty: 20 TABLET | Refills: 0 | Status: SHIPPED | OUTPATIENT
Start: 2018-07-09 | End: 2019-11-26

## 2018-07-09 RX ADMIN — IODIXANOL 100 ML: 320 INJECTION, SOLUTION INTRAVASCULAR at 18:46

## 2018-07-09 RX ADMIN — LORAZEPAM 1 MG: 1 TABLET ORAL at 17:50

## 2018-07-09 ASSESSMENT — ENCOUNTER SYMPTOMS
NERVOUS/ANXIOUS: 1
ARTHRALGIAS: 0
CHEST TIGHTNESS: 1
HEADACHES: 0
NECK STIFFNESS: 0
SHORTNESS OF BREATH: 0
COLOR CHANGE: 0
ABDOMINAL PAIN: 0
FEVER: 0
CONFUSION: 0
DIFFICULTY URINATING: 0
EYE REDNESS: 0
CHILLS: 0

## 2018-07-09 NOTE — ED AVS SNAPSHOT
[facility]    [address]    [place] [address]    Phone:  [phone]    Fax:  [phone]                                       Mervat Lockett   MRN: [id number]    Department:  [facility] and [facility]   Date of Visit:  7/9/2018           After Visit Summary Signature Page     I have received my discharge instructions, and my questions have been answered. I have discussed any challenges I see with this plan with the nurse or doctor.    ..........................................................................................................................................  Patient/Patient Representative Signature      ..........................................................................................................................................  Patient Representative Print Name and Relationship to Patient    ..................................................               ................................................  Date                                            Time    ..........................................................................................................................................  Reviewed by Signature/Title    ...................................................              ..............................................  Date                                                            Time

## 2018-07-09 NOTE — PROGRESS NOTES
1.  Has the patient had a previous reaction to IV contrast? N    2.  Does the patient have kidney disease? no    3.  Is the patient on dialysis? no    If YES to any of these questions, exam will be reviewed with a Radiologist before administering contrast.

## 2018-07-09 NOTE — ED AVS SNAPSHOT
[facility]    [address]    [facility] [address]    Phone:  [phone]    Fax:  [phone]                                       Mervat Lockett   MRN: [id number]    Department:  [facility]   Date of Visit:  7/9/2018           Patient Information     Date Of Birth          1/2/1932        Your diagnoses for this visit were:     Atypical chest pain     Atelectasis, bilateral     Anxiety attack        You were seen by Santiago Tolentino PA-C.      Follow-up Information     Go to Daniel Beyer MD.    Specialty:  Family Practice    Why:  as scheduled in 1 week    Contact information:    [address]  [place] [address] [phone]        Discharge References/Attachments     CHEST PAIN, NONCARDIAC  (ENGLISH)    ATELECTASIS (ENGLISH)    ANXIETY DISORDERS, TREATING, WITH MEDICINE (ENGLISH)      Your next 10 appointments already scheduled     Jul 16, 2018  3:15 PM CDT   SHORT with Daniel Beyer MD   [facility] ([facility])    [address] [place] [address] [address]  formerly [facility] [address] [phone]              24 Hour Appointment Hotline     To schedule an appointment at [facility], please call [phone]. If you don't have a family doctor or clinic, we will help you find one. [facility] are conveniently located to serve the needs of you and your family.           Review of your medicines      START taking        Dose / Directions Last dose taken    LORazepam 0.5 MG tablet   Commonly known as:  ATIVAN   Dose:  0.5 mg   Quantity:  20 tablet        Take 1 tablet (0.5 mg) by mouth every 6 hours as needed for anxiety   Refills:  0          Our records show that you are taking the medicines listed below. If these are incorrect, please call your family doctor or clinic.        Dose / Directions Last dose taken    esomeprazole 40 MG CR capsule   Commonly known as:  nexIUM   Dose:  20 mg        Take 20 mg  by mouth every morning (before breakfast)   Refills:  0        metoprolol succinate 50 MG 24 hr tablet   Commonly known as:  TOPROL-XL   Dose:  25 mg        25 mg 2 times daily   Refills:  0        mirtazapine 15 MG tablet   Commonly known as:  REMERON        Refills:  0        predniSONE 20 MG tablet   Commonly known as:  DELTASONE   Quantity:  18 tablet        3 daily for 3 days; then 2 daily for 3 days; then 1 daily for 3 days with food   Refills:  0        venlafaxine 37.5 MG 24 hr capsule   Commonly known as:  EFFEXOR-XR   Dose:  37.5 mg        37.5 mg 2 times daily   Refills:  0                Prescriptions were sent or printed at these locations (1 Prescription)                   Other Prescriptions                Printed at Department/Unit printer (1 of 1)         LORazepam (ATIVAN) 0.5 MG tablet                Procedures and tests performed during your visit     CBC with platelets differential    CT Chest Pulmonary Embolism w Contrast    Comprehensive metabolic panel    D-Dimer ([facility])    EKG 12-lead, tracing only    Erythrocyte sedimentation rate auto    INR    Troponin I      Orders Needing Specimen Collection     Ordered          07/09/18 1741  *UA reflex to Microscopic - STAT, [facility]: STAT, Needs to be Collected     Scheduled Task Status   07/09/18 1742 Collect *UA reflex to Microscopic Open   Order Class:  PCU Collect                  Pending Results     No orders found from 7/7/2018 to 7/10/2018.            Pending Culture Results     No orders found from 7/7/2018 to 7/10/2018.            Pending Results Instructions     If you had any lab results that were not finalized at the time of your Discharge, you can call the ED Lab Result RN at [phone]. You will be contacted by this team for any positive Lab results or changes in treatment. The nurses are available 7 days a week from 10A to 6:30P.  You can leave a message 24 hours per day and they will return your call.        Thank you for choosing  [facility]       Thank you for choosing [facility] for your care. Our goal is always to provide you with excellent care. Hearing back from our patients is one way we can continue to improve our services. Please take a few minutes to complete the written survey that you may receive in the mail after you visit with us. Thank you!        [facility] Information     [facility] gives you secure access to your electronic health record. If you see a primary care provider, you can also send messages to your care team and make appointments. If you have questions, please call your primary care clinic.  If you do not have a primary care provider, please call [phone] and they will assist you.        Care EveryWhere ID     This is your Care EveryWhere ID. This could be used by other organizations to access your [facility] medical records  [id number]        Equal Access to Services     MARIAA CIFUENTES : Inder Villagran, brijesh hook, evelina little, coby kay. So [facility] [phone].    ATENCIÓN: Si habla español, tiene a echeverria disposición servicios gratuitos de asistencia lingüística. Llame al [phone].    We comply with applicable federal civil rights laws and [place] laws. We do not discriminate on the basis of race, color, national origin, age, disability, sex, sexual orientation, or gender identity.            After Visit Summary       This is your record. Keep this with you and show to your community pharmacist(s) and doctor(s) at your next visit.

## 2018-07-10 NOTE — ED PROVIDER NOTES
History   No chief complaint on file.    HPI Comments: This is a 80-year-old female who resides at home with her .  Today she was watching her  outside.  Does have a history of some chronic back pain but while she is watching him she had sudden onset of of chest tightness as well as left jaw pain.  She felt lightheaded and she felt like he blackness was washing over her.  Denies any nausea or vomiting.  Does admit that she does have anxiety issues and has had these in the past but currently takes Effexor.  The more she has been on a a long prednisone taper due to multiple joint pain.  Denies any shortness breath or true chest pain.  No lightheadedness or dizziness.  No nausea or vomiting.  No diarrhea or constipation.    The history is provided by the patient and the spouse.         Problem List:    Patient Active Problem List    Diagnosis Date Noted     S/P right carpal tunnel surgery 04/25/2018     Priority: Medium     Bilateral carpal tunnel syndrome 02/23/2018     Priority: Medium     Anxiety state 02/01/2018     Priority: Medium     Overview:   Chronic anxiety       Dysthymic disorder 02/01/2018     Priority: Medium     Hypercholesterolemia 02/01/2018     Priority: Medium     Disorder of bone and cartilage 02/01/2018     Priority: Medium     Spinal stenosis, lumbar 02/01/2018     Priority: Medium     Overview:   Degenerative lumbar disc disease with lumbar spinal stenosis       Abnormal CT of the abdomen 08/22/2017     Priority: Medium     Osteoarthrosis involving lower leg 11/12/2012     Priority: Medium     Overview:   IMO Update  Updated per 10/1/17 IMO import       Osteoarthrosis, unspecified whether generalized or localized, pelvic region and thigh 07/09/2012     Priority: Medium     B12 deficiency 03/09/2012     Priority: Medium     Iron deficiency anemia 03/09/2012     Priority: Medium     Hypertension 11/18/2010     Priority: Medium        Past Medical History:    Past Medical History:    Diagnosis Date     ACP (advance care planning) 12/12/2013     Anemia, iron deficiency      Asymptomatic menopausal state      B12 deficiency 3/9/2012     Bilateral carpal tunnel syndrome 2/23/2018     Closed Colles' fracture of right radius with routine healing 04/13/2017     Depression with anxiety 02/01/2018     Disorder of bone and cartilage 2/1/2018     Epigastric abdominal pain      Flushing      Gastrointestinal disorder      Hip pain 12/16/2010     Hypercholesterolemia 2/1/2018     Hypertension 11/18/2010     Lipoma 07/03/2013     Osteopenia      Personal history of other medical treatment (CODE)      Personal history of other medical treatment (CODE)      Plantar wart      Presence of other bone and tendon implants      Primary osteoarthritis of one knee 11/06/2014     S/P right carpal tunnel surgery 4/25/2018     Sacroiliac joint dysfunction 01/28/2011     Spinal stenosis, lumbar 2/1/2018       Past Surgical History:    Past Surgical History:   Procedure Laterality Date     ARTHROPLASTY HIP      3/9/09,Left, by Ashish Ivory M.D.     BLEPHAROPLASTY  04/18/2016    by Dr White Left brow lift     CERVICAL POLYPECTOMY      Endocervical polyps     CYSTOCELE REPAIR      Uterine prolapse and vaginal vault prolapse with cystocele     EXTRACAPSULAR CATARACT EXTRATION WITH INTRAOCULAR LENS IMPLANT  2010    Bilateral cataract extraction - Dr. White     hip arthroplasty  Right      HIP SURGERY Left 2012    Repair left hip / [facility]     HYSTERECTOMY TOTAL ABDOMINAL      Rectocele     OTHER SURGICAL HISTORY  04/21/2017    Right,Synthes stainless steel variable angle volar radial plate with T8 screw heads.     RELEASE CARPAL TUNNEL Right 4/25/2018    Procedure: RELEASE CARPAL TUNNEL;  Right Open Carpal Tunnel Release;  Surgeon: Gerald Martínez DO;  Location:  OR     S/P RIGHT CARPAL TUNNEL RELEASE Right 04/25/2018     SALPINGO-OOPHORECTOMY BILATERAL      4/16/98,Bilateral salpingo oophorectomy and vaginal vault  suspension       Family History:    Family History   Problem Relation Age of Onset     HEART DISEASE Father      Heart Disease,CHF     Thyroid Disease Daughter      Thyroid Disease,Hypothyroid.     Blood Disease Daughter      Blood Disease,severe anemia with a hgb of less than 5     Family History Negative Son      Good Health     Family History Negative Daughter      Good Health     Thyroid Disease Daughter      Thyroid Disease,Hypothyroid.     Arthritis Other      Arthritis,Father's side has rheumatoid arthritis       Social History:  Marital Status:   [2]  Social History   Substance Use Topics     Smoking status: Former Smoker     Smokeless tobacco: Never Used     Alcohol use 1.2 oz/week      Comment: Alcoholic Drinks/day: 1 a night        Medications:      esomeprazole (NEXIUM) 40 MG CR capsule   LORazepam (ATIVAN) 0.5 MG tablet   metoprolol (TOPROL-XL) 50 MG 24 hr tablet   mirtazapine (REMERON) 15 MG tablet   predniSONE (DELTASONE) 20 MG tablet   venlafaxine (EFFEXOR-XR) 37.5 MG 24 hr capsule         Review of Systems   Constitutional: Negative for chills and fever.   HENT: Negative for congestion.    Eyes: Negative for redness.   Respiratory: Positive for chest tightness. Negative for shortness of breath.    Cardiovascular: Negative for chest pain.   Gastrointestinal: Negative for abdominal pain.   Genitourinary: Negative for difficulty urinating.   Musculoskeletal: Negative for arthralgias and neck stiffness.   Skin: Negative for color change.   Neurological: Negative for headaches.   Psychiatric/Behavioral: Negative for confusion. The patient is nervous/anxious.        Physical Exam   BP: 172/79  Pulse: 80  Temp: 97.8  F (36.6  C)  Resp: 16  SpO2: 95 %      Physical Exam   Constitutional: She is oriented to person, place, and time. No distress.   HENT:   Head: Atraumatic.   Mouth/Throat: Oropharynx is clear and moist. No oropharyngeal exudate.   Eyes: Pupils are equal, round, and reactive to light. No  scleral icterus.   Cardiovascular: Normal rate, normal heart sounds and intact distal pulses.  Exam reveals no friction rub.    No murmur heard.  Pulmonary/Chest: Breath sounds normal. No respiratory distress.   Lung sounds clear but decreased throughout.  SaO2 is 95% on room air.   Abdominal: Soft. Bowel sounds are normal. There is no tenderness.   Musculoskeletal: She exhibits no edema or tenderness.   Neurological: She is alert and oriented to person, place, and time.   Skin: Skin is warm. No rash noted. She is not diaphoretic.       ED Course     ED Course     Procedures               EKG Interpretation:      Interpreted by Santiago Flores  Time reviewed: 1740  Symptoms at time of EKG: chest tightness   Rhythm: normal sinus   Rate: normal  Axis: normal  Ectopy: none  Conduction: normal  ST Segments/ T Waves: No ST-T wave changes  Q Waves: none  Comparison to prior: Unchanged    Clinical Impression: normal EKG             Results for orders placed or performed during the hospital encounter of 07/09/18 (from the past 24 hour(s))   CBC with platelets differential   Result Value Ref Range    WBC 12.3 (H) 4.0 - 11.0 10e9/L    RBC Count 4.51 3.8 - 5.2 10e12/L    Hemoglobin 10.9 (L) 11.7 - 15.7 g/dL    Hematocrit 36.6 35.0 - 47.0 %    MCV 81 78 - 100 fl    MCH 24.2 (L) 26.5 - 33.0 pg    MCHC 29.8 (L) 31.5 - 36.5 g/dL    RDW 16.9 (H) 10.0 - 15.0 %    Platelet Count 214 150 - 450 10e9/L    Diff Method Automated Method     % Neutrophils 68.1 %    % Lymphocytes 19.8 %    % Monocytes 8.6 %    % Eosinophils 1.8 %    % Basophils 0.2 %    % Immature Granulocytes 1.5 %    Absolute Neutrophil 8.4 (H) 1.6 - 8.3 10e9/L    Absolute Lymphocytes 2.4 0.8 - 5.3 10e9/L    Absolute Monocytes 1.1 0.0 - 1.3 10e9/L    Absolute Eosinophils 0.2 0.0 - 0.7 10e9/L    Absolute Basophils 0.0 0.0 - 0.2 10e9/L    Abs Immature Granulocytes 0.2 0 - 0.4 10e9/L   D-Dimer ([facility])   Result Value Ref Range    D-Dimer ng/mL 443 (H) 0 - 230 ng/ml D-DU    INR   Result Value Ref Range    INR 0.90 0 - 1.3   Comprehensive metabolic panel   Result Value Ref Range    Sodium 140 134 - 144 mmol/L    Potassium 3.9 3.5 - 5.1 mmol/L    Chloride 106 98 - 107 mmol/L    Carbon Dioxide 26 21 - 31 mmol/L    Anion Gap 8 3 - 14 mmol/L    Glucose 114 (H) 70 - 105 mg/dL    Urea Nitrogen 31 (H) 7 - 25 mg/dL    Creatinine 0.91 0.60 - 1.20 mg/dL    GFR Estimate 59 (L) >60 mL/min/1.7m2    GFR Estimate If Black 71 >60 mL/min/1.7m2    Calcium 9.2 8.6 - 10.3 mg/dL    Bilirubin Total 0.3 0.3 - 1.0 mg/dL    Albumin 3.7 3.5 - 5.7 g/dL    Protein Total 6.8 6.4 - 8.9 g/dL    Alkaline Phosphatase 177 (H) 34 - 104 U/L    ALT 11 7 - 52 U/L    AST 9 (L) 13 - 39 U/L   Troponin I   Result Value Ref Range    Troponin I ES <0.030 0.000 - 0.034 ug/L   Erythrocyte sedimentation rate auto   Result Value Ref Range    Sed Rate 7 1 - 15 mm/h   CT Chest Pulmonary Embolism w Contrast    Narrative    PROCEDURE: CT CHEST PULMONARY EMBOLISM W CONTRAST 7/9/2018 7:05 PM    HISTORY: Dyspnea;     COMPARISONS: None.    Meds/Dose Given: visipaque 320    TECHNIQUE: CT angiogram the chest with sagittal coronal  reconstructions    FINDINGS: There is no pulmonary emboli. There is heavy atherosclerotic  plaquing in the descending thoracic aorta. The heart size is normal.  There is a large hiatal hernia. There is dependent atelectasis at both  lung bases. No pulmonary masses or infiltrates are seen. There is no  hilar or mediastinal masses lymphadenopathy. Axillary and  supraclavicular lymph nodes appear normal. The upper portion of the  liver spleen and pancreas appear normal. There are advanced  degenerative changes in the thoracic spine.         Impression    IMPRESSION:   1. No pulmonary emboli.  2. No pulmonary masses or infiltrates.    FROILAN CLIFFORD MD       Medications   LORazepam (ATIVAN) tablet 1 mg (1 mg Oral Given 7/9/18 7850)   iodixanol (VISIPAQUE 320) injection 100 mL (100 mLs Intravenous Given 7/9/18 3866)        Assessments & Plan (with Medical Decision Making)     I have reviewed the nursing notes.    I have reviewed the findings, diagnosis, plan and need for follow up with the patient.      Discharge Medication List as of 7/9/2018  7:43 PM      START taking these medications    Details   LORazepam (ATIVAN) 0.5 MG tablet Take 1 tablet (0.5 mg) by mouth every 6 hours as needed for anxiety, Disp-20 tablet, R-0, Local Print             Final diagnoses:   Atypical chest pain   Atelectasis, bilateral   Anxiety attack     Afebrile.  Vital signs stable.  Sudden onset of some chest tightness and chest congestion. History of anxiety.  She was given Ativan 1 mg orally.  EKG shows normal sinus rhythm.  Troponin is normal.  CBC shows a white count of 12.3 with a slight left shift.  Hemoglobin is 10.9.  ESR is normal.  CMP shows elevated BUN at 31, GFR is 59, alk phos is 177 and AST is 9.  D-dimer returns elevated at 443.  CT PE study shows no pulmonary emboli and no pulmonary masses or infiltrates.  She does have some minimal dependent atelectasis this of both lung bases.  She continues to remain symptom-free after the Ativan.  Atypical cold chest pain.  While she does have some atelectasis as well as an elevated white count she is afebrile with no upper respiratory infection symptoms.  She does have close follow-up with Dr. Skinner and this can be reevaluated.  She currently takes Effexor for anxiety, will write a prescription for Ativan as well if needed.  Return to the ER if she has worsening chest pain or any concerns problems or questions   7/9/2018   [facility] AND [facility]     Santiago Tolentino PA-C  07/09/18 6541

## 2018-07-16 ENCOUNTER — OFFICE VISIT (OUTPATIENT)
Dept: FAMILY MEDICINE | Facility: OTHER | Age: 83
End: 2018-07-16
Attending: FAMILY MEDICINE
Payer: COMMERCIAL

## 2018-07-16 VITALS
TEMPERATURE: 97.3 F | BODY MASS INDEX: 29.41 KG/M2 | SYSTOLIC BLOOD PRESSURE: 132 MMHG | DIASTOLIC BLOOD PRESSURE: 82 MMHG | WEIGHT: 163.4 LBS

## 2018-07-16 DIAGNOSIS — F41.9 ANXIETY: ICD-10-CM

## 2018-07-16 DIAGNOSIS — I10 BENIGN ESSENTIAL HYPERTENSION: ICD-10-CM

## 2018-07-16 DIAGNOSIS — G89.29 CHRONIC BUTTOCK PAIN: Primary | ICD-10-CM

## 2018-07-16 DIAGNOSIS — M79.18 CHRONIC BUTTOCK PAIN: Primary | ICD-10-CM

## 2018-07-16 PROCEDURE — 99213 OFFICE O/P EST LOW 20 MIN: CPT | Performed by: FAMILY MEDICINE

## 2018-07-16 PROCEDURE — G0463 HOSPITAL OUTPT CLINIC VISIT: HCPCS

## 2018-07-16 RX ORDER — METOPROLOL SUCCINATE 50 MG/1
25 TABLET, EXTENDED RELEASE ORAL
Qty: 90 TABLET | Refills: 3 | Status: SHIPPED | OUTPATIENT
Start: 2018-07-16 | End: 2018-11-28

## 2018-07-16 RX ORDER — MIRTAZAPINE 15 MG/1
15 TABLET, FILM COATED ORAL AT BEDTIME
Qty: 90 TABLET | Refills: 3 | Status: SHIPPED | OUTPATIENT
Start: 2018-07-16 | End: 2018-11-28

## 2018-07-16 RX ORDER — VENLAFAXINE HYDROCHLORIDE 37.5 MG/1
37.5 CAPSULE, EXTENDED RELEASE ORAL 2 TIMES DAILY
Qty: 90 CAPSULE | Refills: 3 | Status: SHIPPED | OUTPATIENT
Start: 2018-07-16 | End: 2018-11-28

## 2018-07-16 ASSESSMENT — ANXIETY QUESTIONNAIRES
1. FEELING NERVOUS, ANXIOUS, OR ON EDGE: SEVERAL DAYS
5. BEING SO RESTLESS THAT IT IS HARD TO SIT STILL: NOT AT ALL
6. BECOMING EASILY ANNOYED OR IRRITABLE: MORE THAN HALF THE DAYS
GAD7 TOTAL SCORE: 4
3. WORRYING TOO MUCH ABOUT DIFFERENT THINGS: NOT AT ALL
2. NOT BEING ABLE TO STOP OR CONTROL WORRYING: NOT AT ALL
IF YOU CHECKED OFF ANY PROBLEMS ON THIS QUESTIONNAIRE, HOW DIFFICULT HAVE THESE PROBLEMS MADE IT FOR YOU TO DO YOUR WORK, TAKE CARE OF THINGS AT HOME, OR GET ALONG WITH OTHER PEOPLE: NOT DIFFICULT AT ALL
7. FEELING AFRAID AS IF SOMETHING AWFUL MIGHT HAPPEN: NOT AT ALL

## 2018-07-16 ASSESSMENT — PAIN SCALES - GENERAL: PAINLEVEL: EXTREME PAIN (8)

## 2018-07-16 ASSESSMENT — PATIENT HEALTH QUESTIONNAIRE - PHQ9: 5. POOR APPETITE OR OVEREATING: SEVERAL DAYS

## 2018-07-16 NOTE — PROGRESS NOTES
SUBJECTIVE:   Mervat Lockett is a 86 year old female who presents to clinic today for the following health issues:  Her buttock pain did not improve w prednisone. She is possibly a little better but not appreciably. Pain is like a knot in R buttock and then across her lower buttocks bilateral ; no bowel nor bladder problem w this             Problem list and histories reviewed & adjusted, as indicated.  Additional history: as documented        Reviewed and updated as needed this visit by clinical staff  Tobacco  Meds       Reviewed and updated as needed this visit by Provider             OBJECTIVE:     /82 (BP Location: Right arm, Patient Position: Sitting, Cuff Size: Adult Large)  Temp 97.3  F (36.3  C) (Temporal)  Wt 163 lb 6.4 oz (74.1 kg)  Breastfeeding? No  BMI 29.41 kg/m2  Body mass index is 29.41 kg/(m^2).  GENERAL: healthy, alert and no distress  MS: no gross musculoskeletal defects noted, no edema/ moves stiffly strength in legs normal         ASSESSMENT/PLAN:           1. Chronic buttock pain  Will do MR of LS spine/      See Patient Instructions    FLEX LEAL MD  [facility] AND [facility]

## 2018-07-16 NOTE — MR AVS SNAPSHOT
After Visit Summary   7/16/2018    Mervat Lockett    MRN: [id number]           Patient Information     Date Of Birth          1/2/1932        Visit Information        Provider Department      7/16/2018 3:15 PM Daniel Beyer MD [facility]        Today's Diagnoses     Chronic buttock pain    -  1    Anxiety        Benign essential hypertension           Follow-ups after your visit        Future tests that were ordered for you today     Open Future Orders        Priority Expected Expires Ordered    MR Lumbar Spine w/o Contrast Routine  7/16/2019 7/16/2018            Who to contact     If you have questions or need follow up information about today's clinic visit or your schedule please contact [facility] directly at [phone].  Normal or non-critical lab and imaging results will be communicated to you by Duable Chinesehart, letter or phone within 4 business days after the clinic has received the results. If you do not hear from us within 7 days, please contact the clinic through SkyTecht or phone. If you have a critical or abnormal lab result, we will notify you by phone as soon as possible.  Submit refill requests through Back[employer] or call your pharmacy and they will forward the refill request to us. Please allow 3 business days for your refill to be completed.          Additional Information About Your Visit        MyChart Information     [employer] gives you secure access to your electronic health record. If you see a primary care provider, you can also send messages to your care team and make appointments. If you have questions, please call your primary care clinic.  If you do not have a primary care provider, please call [phone] and they will assist you.        Care EveryWhere ID     This is your Care EveryWhere ID. This could be used by other organizations to access your [place] medical records  [id number]        Your Vitals Were     Temperature  Breastfeeding? BMI (Body Mass Index)             97.3  F (36.3  C) (Temporal) No 29.41 kg/m2          Blood Pressure from Last 3 Encounters:   07/16/18 132/82   07/09/18 172/79   07/03/18 140/72    Weight from Last 3 Encounters:   07/16/18 163 lb 6.4 oz (74.1 kg)   07/03/18 162 lb (73.5 kg)   06/14/18 162 lb (73.5 kg)                 Today's Medication Changes          These changes are accurate as of 7/16/18  3:57 PM.  If you have any questions, ask your nurse or doctor.               These medicines have changed or have updated prescriptions.        Dose/Directions    metoprolol succinate 50 MG 24 hr tablet   Commonly known as:  TOPROL-XL   This may have changed:  how to take this   Used for:  Benign essential hypertension   Changed by:  Daniel Beyer MD        Dose:  25 mg   Take 0.5 tablets (25 mg) by mouth 2 times daily   Quantity:  90 tablet   Refills:  3       mirtazapine 15 MG tablet   Commonly known as:  REMERON   This may have changed:    - how much to take  - how to take this  - when to take this   Used for:  Anxiety   Changed by:  Daniel Beyer MD        Dose:  15 mg   Take 1 tablet (15 mg) by mouth At Bedtime   Quantity:  90 tablet   Refills:  3       venlafaxine 37.5 MG 24 hr capsule   Commonly known as:  EFFEXOR-XR   This may have changed:  how to take this   Used for:  Anxiety   Changed by:  Daniel Beyer MD        Dose:  37.5 mg   Take 1 capsule (37.5 mg) by mouth 2 times daily   Quantity:  90 capsule   Refills:  3         Stop taking these medicines if you haven't already. Please contact your care team if you have questions.     predniSONE 20 MG tablet   Commonly known as:  DELTASONE   Stopped by:  Daniel Beyer MD                Where to get your medicines      These medications were sent to [id number] - [place], [place] - [address], [facility] [address]     Phone:  [phone]     metoprolol succinate 50 MG 24 hr  tablet    mirtazapine 15 MG tablet    venlafaxine 37.5 MG 24 hr capsule                Primary Care Provider Office Phone # Fax #    Daniel Beyer [phone] [address]   [facility] [address]        Equal Access to Services     [facility] : Hadii valarie ku hadraveno Soomaali, waaxda luqadaha, qaybta kaalmada adeegyada, waxbuck callaway isabeln jennifer pappasdevikagomez kay. So [facility] [phone].    ATENCIÓN: Si habla español, tiene a echeverria disposición servicios gratuitos de asistencia lingüística. Llame al [phone].    We comply with applicable federal civil rights laws and [place] laws. We do not discriminate on the basis of race, color, national origin, age, disability, sex, sexual orientation, or gender identity.            Thank you!     Thank you for choosing [facility] AND [facility]  for your care. Our goal is always to provide you with excellent care. Hearing back from our patients is one way we can continue to improve our services. Please take a few minutes to complete the written survey that you may receive in the mail after your visit with us. Thank you!             Your Updated Medication List - Protect others around you: Learn how to safely use, store and throw away your medicines at www.disposemymeds.org.          This list is accurate as of 7/16/18  3:57 PM.  Always use your most recent med list.                   Brand Name Dispense Instructions for use Diagnosis    esomeprazole 40 MG CR capsule    nexIUM     Take 20 mg by mouth every morning (before breakfast)        LORazepam 0.5 MG tablet    ATIVAN    20 tablet    Take 1 tablet (0.5 mg) by mouth every 6 hours as needed for anxiety        metoprolol succinate 50 MG 24 hr tablet    TOPROL-XL    90 tablet    Take 0.5 tablets (25 mg) by mouth 2 times daily    Benign essential hypertension       mirtazapine 15 MG tablet    REMERON    90 tablet    Take 1 tablet (15 mg) by mouth At Bedtime    Anxiety       venlafaxine 37.5  MG 24 hr capsule    EFFEXOR-XR    90 capsule    Take 1 capsule (37.5 mg) by mouth 2 times daily    Anxiety

## 2018-07-17 ASSESSMENT — PATIENT HEALTH QUESTIONNAIRE - PHQ9: SUM OF ALL RESPONSES TO PHQ QUESTIONS 1-9: 6

## 2018-07-17 ASSESSMENT — ANXIETY QUESTIONNAIRES: GAD7 TOTAL SCORE: 4

## 2018-07-18 ENCOUNTER — HOSPITAL ENCOUNTER (OUTPATIENT)
Dept: MRI IMAGING | Facility: OTHER | Age: 83
Discharge: HOME | End: 2018-07-18
Attending: FAMILY MEDICINE | Admitting: FAMILY MEDICINE
Payer: MEDICARE

## 2018-07-18 DIAGNOSIS — M79.18 CHRONIC BUTTOCK PAIN: ICD-10-CM

## 2018-07-18 DIAGNOSIS — G89.29 CHRONIC BUTTOCK PAIN: ICD-10-CM

## 2018-07-18 PROCEDURE — 72148 MRI LUMBAR SPINE W/O DYE: CPT

## 2018-07-19 ENCOUNTER — MYC MEDICAL ADVICE (OUTPATIENT)
Dept: FAMILY MEDICINE | Facility: OTHER | Age: 83
End: 2018-07-19

## 2018-07-19 ENCOUNTER — TELEPHONE (OUTPATIENT)
Dept: FAMILY MEDICINE | Facility: OTHER | Age: 83
End: 2018-07-19

## 2018-07-19 NOTE — TELEPHONE ENCOUNTER
Patient verbally notified of letter that was generated today in regards to MRI results.  [employer] message with the letter in it sent as well.  Patient transferred to appointment line to have a follow up appointment with Daniel Beyer MD.      Leana Leach LPN 7/19/2018 11:21 AM

## 2018-07-23 ENCOUNTER — OFFICE VISIT (OUTPATIENT)
Dept: FAMILY MEDICINE | Facility: OTHER | Age: 83
End: 2018-07-23
Attending: FAMILY MEDICINE
Payer: COMMERCIAL

## 2018-07-23 VITALS
SYSTOLIC BLOOD PRESSURE: 122 MMHG | BODY MASS INDEX: 29.06 KG/M2 | HEIGHT: 63 IN | DIASTOLIC BLOOD PRESSURE: 72 MMHG | WEIGHT: 164 LBS

## 2018-07-23 DIAGNOSIS — M84.48XS SACRAL INSUFFICIENCY FRACTURE, SEQUELA: Primary | ICD-10-CM

## 2018-07-23 PROCEDURE — 99213 OFFICE O/P EST LOW 20 MIN: CPT | Performed by: FAMILY MEDICINE

## 2018-07-23 PROCEDURE — G0463 HOSPITAL OUTPT CLINIC VISIT: HCPCS

## 2018-07-23 ASSESSMENT — PAIN SCALES - GENERAL: PAINLEVEL: SEVERE PAIN (6)

## 2018-07-23 NOTE — LETTER
August 6, 2018      Mervat Lockett  [address] [facility] [address]        Dear ,    We are writing to inform you of your test results.    Your DEXA scan was hard to interpret due to your hip replacements- but result came back as OK.  FLEX LEAL MD on 8/6/2018 at 7:53 AM     Resulted Orders   DX Hip/Pelvis/Spine    Narrative    Procedure:  DX HIP/PELVIS/SPINE    Clinical History:  ; Sacral insufficiency fracture, sequela    Comparison:  2/6/2009    Interpretation:  Osteopenia    Lowest Category Site:  Left forearm    Lowest Category T-Score:  -1.7    Lowest Category % Change from most recent:  Not applicable, as the  forearm was not measured on the prior study       FRAX score cannot be calculated as the patient has bilateral hip  replacements and the spine measurements were felt to be not accurate  due to arthritis.          Only the lowest category is reported for each patient per guidelines  of the International Society for Clinical Densitometry.    See attached DXA images and FRAX report for further details.    WHO DIAGNOSTIC GUIDELINES FOR BONE MASS MEASUREMENT:    Normal                        T-Score at or above -1.0    Osteopenia                T-Score between -1.0 and -2.5    Osteoporosis            T-Score at or below -2.5    Providers should consider medical therapies when 10 year probability  of hip fracture is greater than or equal to 3%, or 10 year probability  of major osteoporosis related fracture is greater than or equal to  20%.    ** Exam performed on [employer]    TREE ZIMMERMAN MD       If you have any questions or concerns, please call the clinic at the number listed above.       Sincerely,        FLEX LEAL MD

## 2018-07-23 NOTE — PROGRESS NOTES
"  SUBJECTIVE:   Mervat Lockett is a 86 year old female who presents to clinic today for the following health issues:  We discussed her sacral insufficieny fractures/ she has stable amount of pain/ she is taking tylenol prn.   We discussed PT and DEXA which will be set up          Problem list and histories reviewed & adjusted, as indicated.  Additional history: as documented        Reviewed and updated as needed this visit by clinical staff  Tobacco  Meds       Reviewed and updated as needed this visit by Provider           OBJECTIVE:     /72 (BP Location: Right arm, Patient Position: Sitting, Cuff Size: Adult Regular)  Ht 5' 2.5\" (1.588 m)  Wt 164 lb (74.4 kg)  Breastfeeding? No  BMI 29.52 kg/m2  Body mass index is 29.52 kg/(m^2).  GENERAL: healthy, alert and no distress  MS: no gross musculoskeletal defects noted, no edema/ moves slowly but deliberately      ASSESSMENT/PLAN:       1. Sacral insufficiency fracture, sequela  PT and DEXA scan      See Patient Instructions    FLEX LEAL MD  [facility]  "

## 2018-07-23 NOTE — PROGRESS NOTES
Patient Information     Patient Name  Mervat Lockett MRN  [id number] Sex  Female   1932      Letter by Daniel Beyer MD at      Author:  Daniel Beyer MD Service:  (none) Author Type:  (none)    Filed:   Encounter Date:  2017 Status:  (Other)           Mervat Lockett  [address] [facility] [address]          2017    Dear Ms. Lockett:    Your labs look fine.   Daniel Beyer MD ....................  2017   2:44 PM     Results for orders placed or performed in visit on 17       BASIC METABOLIC PANEL       Result  Value Ref Range Status    SODIUM 141 133 - 143 mmol/L Final    POTASSIUM 4.5 3.5 - 5.1 mmol/L Final    CHLORIDE 106 98 - 107 mmol/L Final    CO2,TOTAL 26 21 - 31 mmol/L Final    ANION GAP 9 5 - 18                 Final    GLUCOSE 116 (H) 70 - 105 mg/dL Final    CALCIUM 9.4 8.6 - 10.3 mg/dL Final    BUN 22 7 - 25 mg/dL Final    CREATININE 0.98 0.70 - 1.30 mg/dL Final    BUN/CREAT RATIO           22                 Final    GFR if African American >60 >60 ml/min/1.73m2 Final    GFR if not African American 54 (L) >60 ml/min/1.73m2 Final   CBC WITH AUTO DIFFERENTIAL       Result  Value Ref Range Status    WHITE BLOOD COUNT         7.7 4.5 - 11.0 thou/cu mm Final    RED BLOOD COUNT           4.41 4.00 - 5.20 mil/cu mm Final    HEMOGLOBIN                11.1 (L) 12.0 - 16.0 g/dL Final    HEMATOCRIT                36.9 33.0 - 51.0 % Final    MCV                       84 80 - 100 fL Final    MCH                       25.2 (L) 26.0 - 34.0 pg Final    MCHC                      30.1 (L) 32.0 - 36.0 g/dL Final    RDW                       15.7 (H) 11.5 - 15.5 % Final    PLATELET COUNT            294 140 - 440 thou/cu mm Final    MPV                       10.2 6.5 - 11.0 fL Final    NEUTROPHILS               67.1 42.0 - 72.0 % Final    LYMPHOCYTES               19.2 (L) 20.0 - 44.0 % Final    MONOCYTES                 8.7 <12.0 % Final    EOSINOPHILS               4.1  <8.0 % Final    BASOPHILS                 0.4 <3.0 % Final    IMMATURE GRANULOCYTES(METAS,MYELOS,PROS) 0.5 % Final    ABSOLUTE NEUTROPHILS      5.2 1.7 - 7.0 thou/cu mm Final    ABSOLUTE LYMPHOCYTES      1.5 0.9 - 2.9 thou/cu mm Final    ABSOLUTE MONOCYTES        0.7 <0.9 thou/cu mm Final    ABSOLUTE EOSINOPHILS      0.3 <0.5 thou/cu mm Final    ABSOLUTE BASOPHILS        0.0 <0.3 thou/cu mm Final    ABSOLUTE IMMATURE GRANULOCYTES(METAS,MYELOS,PROS) 0.0 <=0.3 thou/cu mm Final

## 2018-07-23 NOTE — MR AVS SNAPSHOT
"              After Visit Summary   7/23/2018    Mervat Lockett    MRN: [id number]           Patient Information     Date Of Birth          1/2/1932        Visit Information        Provider Department      7/23/2018 2:15 PM Daniel Beyer MD [facility]        Today's Diagnoses     Sacral insufficiency fracture, sequela    -  1       Follow-ups after your visit        Additional Services     PHYSICAL THERAPY REFERRAL       *This therapy referral will be filtered to a centralized scheduling office at [facility] and the patient will receive a call to schedule an appointment at a [place] location most convenient for them. *     [facility] provides Physical Therapy evaluation and treatment and many specialty services across the [place] system.  If requesting a specialty program, please choose from the list below.    If you have not heard from the scheduling office within 2 business days, please call [phone] for all locations, with the exception of [place], please call [phone] and [facility], please call [phone]  Treatment: Evaluation & Treatment  [facility] PT    Please be aware that coverage of these services is subject to the terms and limitations of your health insurance plan.  Call member services at your health plan with any benefit or coverage questions.      **Note to Provider:  If you are referring outside of [place] for the therapy appointment, please list the name of the location in the \"special instructions\" above, print the referral and give to the patient to schedule the appointment.                  Who to contact     If you have questions or need follow up information about today's clinic visit or your schedule please contact [facility] AND [facility] directly at [phone].  Normal or non-critical lab and imaging results will be communicated to you by MyChart, letter or phone within 4 business days after the clinic " "has received the results. If you do not hear from us within 7 days, please contact the clinic through stickK or phone. If you have a critical or abnormal lab result, we will notify you by phone as soon as possible.  Submit refill requests through stickK or call your pharmacy and they will forward the refill request to us. Please allow 3 business days for your refill to be completed.          Additional Information About Your Visit        [employer] Information     stickK gives you secure access to your electronic health record. If you see a primary care provider, you can also send messages to your care team and make appointments. If you have questions, please call your primary care clinic.  If you do not have a primary care provider, please call [phone] and they will assist you.        Care EveryWhere ID     This is your Care EveryWhere ID. This could be used by other organizations to access your [place] medical records  [id number]        Your Vitals Were     Height Breastfeeding? BMI (Body Mass Index)             5' 2.5\" (1.588 m) No 29.52 kg/m2          Blood Pressure from Last 3 Encounters:   07/23/18 122/72   07/16/18 132/82   07/09/18 172/79    Weight from Last 3 Encounters:   07/23/18 164 lb (74.4 kg)   07/16/18 163 lb 6.4 oz (74.1 kg)   07/03/18 162 lb (73.5 kg)              We Performed the Following     DEXA - HIM Scan     PHYSICAL THERAPY REFERRAL        Primary Care Provider Office Phone # Fax #    Daniel Beyer [phone]       [address] [facility] [address]        Equal Access to Services     [facility]: Hadii aad ku hadasho Soomaali, waaxda luqadaha, qaybta kaalmada coby little . So [facility] [phone].    ATENCIÓN: Si habla español, tiene a echeverria disposición servicios gratuitos de asistencia lingüística. Llame al [phone].    We comply with applicable federal civil rights laws and [place] laws. We do not " discriminate on the basis of race, color, national origin, age, disability, sex, sexual orientation, or gender identity.            Thank you!     Thank you for choosing [facility] AND [facility]  for your care. Our goal is always to provide you with excellent care. Hearing back from our patients is one way we can continue to improve our services. Please take a few minutes to complete the written survey that you may receive in the mail after your visit with us. Thank you!             Your Updated Medication List - Protect others around you: Learn how to safely use, store and throw away your medicines at www.disposemymeds.org.          This list is accurate as of 7/23/18  2:45 PM.  Always use your most recent med list.                   Brand Name Dispense Instructions for use Diagnosis    esomeprazole 40 MG CR capsule    nexIUM     Take 20 mg by mouth every morning (before breakfast)        LORazepam 0.5 MG tablet    ATIVAN    20 tablet    Take 1 tablet (0.5 mg) by mouth every 6 hours as needed for anxiety        metoprolol succinate 50 MG 24 hr tablet    TOPROL-XL    90 tablet    Take 0.5 tablets (25 mg) by mouth 2 times daily    Benign essential hypertension       mirtazapine 15 MG tablet    REMERON    90 tablet    Take 1 tablet (15 mg) by mouth At Bedtime    Anxiety       venlafaxine 37.5 MG 24 hr capsule    EFFEXOR-XR    90 capsule    Take 1 capsule (37.5 mg) by mouth 2 times daily    Anxiety

## 2018-07-23 NOTE — PROGRESS NOTES
Patient Information     Patient Name  Mervat Lockett MRN  [id number] Sex  Female   1932      Letter by Daniel Beyer MD at      Author:  Daniel Beyer MD Service:  (none) Author Type:  (none)    Filed:   Date of Service:   Status:  (Other)           Mervat Lockett  [address] [facility] [address]          2017    Dear Ms. Lockett:    Your MRI shows the same abnormality with no change. This is good news and it would be wise to recheck this in a year according to the radiologist. [facility].  Daniel Beyer MD ....................  2017   12:26 PM

## 2018-08-01 ENCOUNTER — HOSPITAL ENCOUNTER (OUTPATIENT)
Dept: BONE DENSITY | Facility: OTHER | Age: 83
Discharge: HOME | End: 2018-08-01
Attending: FAMILY MEDICINE | Admitting: FAMILY MEDICINE
Payer: MEDICARE

## 2018-08-01 PROCEDURE — 77080 DXA BONE DENSITY AXIAL: CPT

## 2018-08-09 ENCOUNTER — TRANSFERRED RECORDS (OUTPATIENT)
Dept: HEALTH INFORMATION MANAGEMENT | Facility: OTHER | Age: 83
End: 2018-08-09

## 2018-08-20 DIAGNOSIS — F41.9 ANXIETY: ICD-10-CM

## 2018-08-22 RX ORDER — MIRTAZAPINE 15 MG/1
TABLET, FILM COATED ORAL
Qty: 90 TABLET | OUTPATIENT
Start: 2018-08-22

## 2018-08-22 NOTE — TELEPHONE ENCOUNTER
Refill request from [facility] for:  mirtazapine (REMERON) 15 MG tablet    LOV 7/23/2018 with PCP    Medication refilled 7/16/2018 for 90 X 3 (1 year)    Too soon to fill.    Unable to complete prescription refill per RN Medication Refill Policy.................... Chiquis Maguire ....................  8/22/2018   9:53 AM

## 2018-08-24 DIAGNOSIS — F41.9 ANXIETY: ICD-10-CM

## 2018-08-27 RX ORDER — MIRTAZAPINE 15 MG/1
TABLET, FILM COATED ORAL
Qty: 90 TABLET | Refills: 0 | OUTPATIENT
Start: 2018-08-27

## 2018-08-27 RX ORDER — VENLAFAXINE HYDROCHLORIDE 75 MG/1
CAPSULE, EXTENDED RELEASE ORAL
Qty: 90 CAPSULE | Refills: 0 | OUTPATIENT
Start: 2018-08-27

## 2018-08-27 NOTE — TELEPHONE ENCOUNTER
[facility] is requesting new prescriptions for the following:    VENLAFAXINE ER 75MG CAPSULES  Will file in chart as: venlafaxine (EFFEXOR-XR) 75 MG 24 hr capsule  TAKE 1 CAPSULE BY MOUTH EVERY DAY WITH A MEAL    MIRTAZAPINE 15MG TABLETS  Will file in chart as: mirtazapine (REMERON) 15 MG tablet  TAKE 1/2 TO 1 TABLET BY MOUTH AT BEDTIME    **Patient requests 90 days supply**    These were last filled and represent sig listed on current medication list at last OV on 7/23/18.    venlafaxine (EFFEXOR-XR) 37.5 MG 24 hr capsule 90 capsule 3 7/16/2018  No   Sig - Route: Take 1 capsule (37.5 mg) by mouth 2 times daily - Oral     mirtazapine (REMERON) 15 MG tablet 90 tablet 3 7/16/2018  No   Sig - Route: Take 1 tablet (15 mg) by mouth At Bedtime - Oral   Class: E-Prescribe     [facility] - [facility] [address]     Called and spoke to  [facility], after verifying last name and date of birth. They stated they have the most recent prescriptions on file and the new requests can be disregarded at this time. Refill requests refused. Unable to complete prescription refill per RN Medication Refill Policy. Kristen Jackson RN .............. 8/27/2018  3:41 PM

## 2018-08-28 DIAGNOSIS — F41.9 ANXIETY: ICD-10-CM

## 2018-08-30 RX ORDER — VENLAFAXINE HYDROCHLORIDE 37.5 MG/1
CAPSULE, EXTENDED RELEASE ORAL
Qty: 180 CAPSULE | Refills: 0 | OUTPATIENT
Start: 2018-08-30

## 2018-08-30 NOTE — TELEPHONE ENCOUNTER
Prescription for VENLAFAXINE ER 37.5MG CAPSULES approved per [facility] Refill Protocol.  LOV: 07/23/18  Will be refusing refill request because:  Medication Detail      Disp Refills Start End DANK   venlafaxine (EFFEXOR-XR) 37.5 MG 24 hr capsule 90 capsule 3 7/16/2018  No   Sig - Route: Take 1 capsule (37.5 mg) by mouth 2 times daily - Oral   Class: E-Prescribe   Order: [id number]   E-Prescribing Status: Receipt confirmed by pharmacy (7/16/2018  3:57 PM CDT)     Selin Galaviz RN on 8/30/2018 at 11:52 AM

## 2018-10-11 DIAGNOSIS — F41.1 ANXIETY STATE: Primary | ICD-10-CM

## 2018-10-15 NOTE — TELEPHONE ENCOUNTER
"[facility] sent Rx request for the following:     VENLAFAXINE 37.5MG TABLETS  TAKE 1/2 TABLET BY MOUTH TWICE DAILY  Last Written Prescription Date:  8/21/17  Last Fill Quantity: 90,   # refills: 3 (at [facility])      Last Written Prescription for Venlafaxine and how it is listed on LOV updated medication list:  venlafaxine (EFFEXOR-XR) 37.5 MG 24 hr capsule 90 capsule 3 7/16/2018  No   Sig - Route: Take 1 capsule (37.5 mg) by mouth 2 times daily - Oral     Last Office Visit: 7/23/18 (Dr. Beyer)  Future Office visit: None.    In the absence of Dr. Beyer, it is recommended that Patient call to schedule appointment with one of our other providers to discuss your medications and medication refills. At the same time, Pt can discuss you might intend on establishing care with.    Called and spoke to Patient after verifying last name and date of birth. Patient confirmed that she takes the medication as requested 1/2 (37.5 mg) tablet, twice daily: total daily = 37.5 mg. Pt verbalized agreement to call back to schedule an appointment for medication review with another provider, stating \"you caught me at a very busy time, I am the  for [employer], and I need to be out the door, NOW!\"    Routing refill request to provider for review/approval because:    Requested sig different than last filled.    Patient needs to schedule medication review appointment.    Kristen Jackson RN .............. 10/15/2018  4:31 PM          "

## 2018-10-16 RX ORDER — VENLAFAXINE 37.5 MG/1
TABLET ORAL
Qty: 90 TABLET | Refills: 3 | Status: SHIPPED | OUTPATIENT
Start: 2018-10-16 | End: 2018-11-28

## 2018-11-28 ENCOUNTER — OFFICE VISIT (OUTPATIENT)
Dept: INTERNAL MEDICINE | Facility: OTHER | Age: 83
End: 2018-11-28
Attending: NURSE PRACTITIONER
Payer: MEDICARE

## 2018-11-28 VITALS
DIASTOLIC BLOOD PRESSURE: 70 MMHG | HEART RATE: 78 BPM | SYSTOLIC BLOOD PRESSURE: 136 MMHG | WEIGHT: 163.9 LBS | BODY MASS INDEX: 29.5 KG/M2

## 2018-11-28 DIAGNOSIS — I10 ESSENTIAL HYPERTENSION: ICD-10-CM

## 2018-11-28 DIAGNOSIS — M48.062 SPINAL STENOSIS OF LUMBAR REGION WITH NEUROGENIC CLAUDICATION: ICD-10-CM

## 2018-11-28 DIAGNOSIS — K86.9 PANCREATIC LESION: ICD-10-CM

## 2018-11-28 DIAGNOSIS — F41.9 ANXIETY: ICD-10-CM

## 2018-11-28 DIAGNOSIS — K21.9 GASTROESOPHAGEAL REFLUX DISEASE WITHOUT ESOPHAGITIS: ICD-10-CM

## 2018-11-28 DIAGNOSIS — F34.1 DYSTHYMIC DISORDER: ICD-10-CM

## 2018-11-28 DIAGNOSIS — D50.9 IRON DEFICIENCY ANEMIA, UNSPECIFIED IRON DEFICIENCY ANEMIA TYPE: Primary | ICD-10-CM

## 2018-11-28 DIAGNOSIS — E53.8 B12 DEFICIENCY: ICD-10-CM

## 2018-11-28 DIAGNOSIS — R73.9 HYPERGLYCEMIA: ICD-10-CM

## 2018-11-28 DIAGNOSIS — R68.89 FORGETFULNESS: ICD-10-CM

## 2018-11-28 DIAGNOSIS — R53.83 FATIGUE, UNSPECIFIED TYPE: ICD-10-CM

## 2018-11-28 DIAGNOSIS — R63.5 WEIGHT GAIN: ICD-10-CM

## 2018-11-28 LAB
ALBUMIN SERPL-MCNC: 3.8 G/DL (ref 3.5–5.7)
ALP SERPL-CCNC: 67 U/L (ref 34–104)
ALT SERPL W P-5'-P-CCNC: 12 U/L (ref 7–52)
ANION GAP SERPL CALCULATED.3IONS-SCNC: 6 MMOL/L (ref 3–14)
AST SERPL W P-5'-P-CCNC: 15 U/L (ref 13–39)
BILIRUB SERPL-MCNC: 0.4 MG/DL (ref 0.3–1)
BUN SERPL-MCNC: 21 MG/DL (ref 7–25)
CALCIUM SERPL-MCNC: 9.3 MG/DL (ref 8.6–10.3)
CHLORIDE SERPL-SCNC: 106 MMOL/L (ref 98–107)
CO2 SERPL-SCNC: 27 MMOL/L (ref 21–31)
CREAT SERPL-MCNC: 0.95 MG/DL (ref 0.6–1.2)
ERYTHROCYTE [DISTWIDTH] IN BLOOD BY AUTOMATED COUNT: 16.1 % (ref 10–15)
FERRITIN SERPL-MCNC: 9 NG/ML (ref 23.9–336.2)
FOLATE SERPL-MCNC: 5.3 NG/ML
GFR SERPL CREATININE-BSD FRML MDRD: 56 ML/MIN/1.7M2
GLUCOSE SERPL-MCNC: 127 MG/DL (ref 70–105)
HBA1C MFR BLD: 6.5 % (ref 4–6)
HCT VFR BLD AUTO: 36.2 % (ref 35–47)
HGB BLD-MCNC: 10.4 G/DL (ref 11.7–15.7)
IRON SATN MFR SERPL: 5 % (ref 20–55)
IRON SERPL-MCNC: 27 UG/DL (ref 50–212)
MCH RBC QN AUTO: 22.8 PG (ref 26.5–33)
MCHC RBC AUTO-ENTMCNC: 28.7 G/DL (ref 31.5–36.5)
MCV RBC AUTO: 79 FL (ref 78–100)
PLATELET # BLD AUTO: 233 10E9/L (ref 150–450)
POTASSIUM SERPL-SCNC: 4.1 MMOL/L (ref 3.5–5.1)
PROT SERPL-MCNC: 6.8 G/DL (ref 6.4–8.9)
RBC # BLD AUTO: 4.56 10E12/L (ref 3.8–5.2)
SODIUM SERPL-SCNC: 139 MMOL/L (ref 134–144)
TIBC SERPL-MCNC: 506.8 UG/DL (ref 245–400)
TSH SERPL DL<=0.05 MIU/L-ACNC: 2.88 IU/ML (ref 0.34–5.6)
UIBC (UNSATURATED): 479.8 MG/DL
VIT B12 SERPL-MCNC: 233 PG/ML (ref 180–914)
WBC # BLD AUTO: 5.9 10E9/L (ref 4–11)

## 2018-11-28 PROCEDURE — 85027 COMPLETE CBC AUTOMATED: CPT | Performed by: NURSE PRACTITIONER

## 2018-11-28 PROCEDURE — 80053 COMPREHEN METABOLIC PANEL: CPT | Performed by: NURSE PRACTITIONER

## 2018-11-28 PROCEDURE — 36415 COLL VENOUS BLD VENIPUNCTURE: CPT | Performed by: NURSE PRACTITIONER

## 2018-11-28 PROCEDURE — 82607 VITAMIN B-12: CPT | Performed by: NURSE PRACTITIONER

## 2018-11-28 PROCEDURE — 82728 ASSAY OF FERRITIN: CPT | Performed by: NURSE PRACTITIONER

## 2018-11-28 PROCEDURE — 84443 ASSAY THYROID STIM HORMONE: CPT | Performed by: NURSE PRACTITIONER

## 2018-11-28 PROCEDURE — 82746 ASSAY OF FOLIC ACID SERUM: CPT | Performed by: NURSE PRACTITIONER

## 2018-11-28 PROCEDURE — 83550 IRON BINDING TEST: CPT | Performed by: NURSE PRACTITIONER

## 2018-11-28 PROCEDURE — 83540 ASSAY OF IRON: CPT | Performed by: NURSE PRACTITIONER

## 2018-11-28 PROCEDURE — 83036 HEMOGLOBIN GLYCOSYLATED A1C: CPT | Performed by: NURSE PRACTITIONER

## 2018-11-28 PROCEDURE — G0463 HOSPITAL OUTPT CLINIC VISIT: HCPCS

## 2018-11-28 PROCEDURE — 99215 OFFICE O/P EST HI 40 MIN: CPT | Performed by: NURSE PRACTITIONER

## 2018-11-28 RX ORDER — METOPROLOL SUCCINATE 50 MG/1
25 TABLET, EXTENDED RELEASE ORAL
Qty: 90 TABLET | Refills: 3 | Status: SHIPPED | OUTPATIENT
Start: 2018-11-28 | End: 2019-11-26

## 2018-11-28 RX ORDER — VENLAFAXINE 37.5 MG/1
TABLET ORAL
Qty: 90 TABLET | Refills: 3 | Status: SHIPPED | OUTPATIENT
Start: 2018-11-28 | End: 2018-12-14

## 2018-11-28 RX ORDER — MIRTAZAPINE 15 MG/1
15 TABLET, FILM COATED ORAL AT BEDTIME
Qty: 90 TABLET | Refills: 3 | Status: SHIPPED | OUTPATIENT
Start: 2018-11-28 | End: 2018-12-14

## 2018-11-28 ASSESSMENT — ENCOUNTER SYMPTOMS
APPETITE CHANGE: 0
FATIGUE: 1
BACK PAIN: 1
DYSURIA: 0
HEMATOCHEZIA: 0
HEARTBURN: 1
ENDOCRINE NEGATIVE: 1
ABDOMINAL PAIN: 0
SHORTNESS OF BREATH: 0
DIZZINESS: 0
PALPITATIONS: 0
COUGH: 0
WHEEZING: 0
LIGHT-HEADEDNESS: 0
HEMATURIA: 0
ACTIVITY CHANGE: 1
WEAKNESS: 0
AGITATION: 0

## 2018-11-28 ASSESSMENT — PAIN SCALES - GENERAL: PAINLEVEL: NO PAIN (0)

## 2018-11-28 NOTE — MR AVS SNAPSHOT
After Visit Summary   11/28/2018    Mervat Lockett    MRN: [id number]           Patient Information     Date Of Birth          1/2/1932        Visit Information        Provider Department      11/28/2018 12:40 PM Mirna Genao NP [facility]        Today's Diagnoses     Iron deficiency anemia, unspecified iron deficiency anemia type    -  1    B12 deficiency        Essential hypertension        Dysthymic disorder        Pancreatic lesion        Fatigue, unspecified type        Weight gain        Gastroesophageal reflux disease without esophagitis        Forgetfulness        Hyperglycemia        Anxiety        Spinal stenosis of lumbar region with neurogenic claudication          Care Instructions    You will have lab work done today to evaluate your concerns.  You will also be scheduled for another MRI of the abdomen to follow up on the pancreatic lesion that was seen last year.  Switch your mirtazapine to 1 pill before bedtime rather than 1/2 pill twice daily.          Follow-ups after your visit        Future tests that were ordered for you today     Open Future Orders        Priority Expected Expires Ordered    CBC with platelets Routine  11/29/2019 11/28/2018    Comprehensive metabolic panel Routine  11/29/2019 11/28/2018    Hemoglobin A1c Routine  11/29/2019 11/28/2018    Thyrotropin GH Routine  11/29/2019 11/28/2018    MR Abdomen w/o & w Contrast Routine  11/28/2019 11/28/2018    Vitamin B12 Routine  11/29/2019 11/28/2018            Who to contact     If you have questions or need follow up information about today's clinic visit or your schedule please contact [facility] directly at [phone].  Normal or non-critical lab and imaging results will be communicated to you by MyChart, letter or phone within 4 business days after the clinic has received the results. If you do not hear from us within 7 days, please contact the clinic through  [employer] or phone. If you have a critical or abnormal lab result, we will notify you by phone as soon as possible.  Submit refill requests through [employer] or call your pharmacy and they will forward the refill request to us. Please allow 3 business days for your refill to be completed.          Additional Information About Your Visit        [email or web address] Information     [employer] gives you secure access to your electronic health record. If you see a primary care provider, you can also send messages to your care team and make appointments. If you have questions, please call your primary care clinic.  If you do not have a primary care provider, please call [phone] and they will assist you.        Care EveryWhere ID     This is your Care EveryWhere ID. This could be used by other organizations to access your [place] medical records  [id number]        Your Vitals Were     Pulse Breastfeeding? BMI (Body Mass Index)             78 No 29.5 kg/m2          Blood Pressure from Last 3 Encounters:   11/28/18 136/70   07/23/18 122/72   07/16/18 132/82    Weight from Last 3 Encounters:   11/28/18 163 lb 14.4 oz (74.3 kg)   07/23/18 164 lb (74.4 kg)   07/16/18 163 lb 6.4 oz (74.1 kg)                 Today's Medication Changes          These changes are accurate as of 11/28/18  1:35 PM.  If you have any questions, ask your nurse or doctor.               These medicines have changed or have updated prescriptions.        Dose/Directions    * esomeprazole 40 MG DR capsule   Commonly known as:  nexIUM   This may have changed:  Another medication with the same name was added. Make sure you understand how and when to take each.   Changed by:  Mirna Genao NP        Dose:  20 mg   Take 20 mg by mouth every morning (before breakfast)   Refills:  0       * esomeprazole 20 MG DR capsule   Commonly known as:  nexIUM   This may have changed:  You were already taking a medication with the same name, and this prescription was added.  Make sure you understand how and when to take each.   Used for:  Gastroesophageal reflux disease without esophagitis   Changed by:  Mirna Genao NP        Dose:  20 mg   Take 1 capsule (20 mg) by mouth every morning (before breakfast) Take 30-60 minutes before eating.   Quantity:  90 capsule   Refills:  3       venlafaxine 37.5 MG tablet   Commonly known as:  EFFEXOR   This may have changed:  Another medication with the same name was removed. Continue taking this medication, and follow the directions you see here.   Used for:  Anxiety   Changed by:  Mirna Genao NP        TAKE 1/2 TABLET BY MOUTH TWICE DAILY   Quantity:  90 tablet   Refills:  3       * Notice:  This list has 2 medication(s) that are the same as other medications prescribed for you. Read the directions carefully, and ask your doctor or other care provider to review them with you.         Where to get your medicines      These medications were sent to [facility] [address] [place], [place] - [address]  [address], [facility] [address]     Phone:  [phone]     esomeprazole 20 MG DR capsule    metoprolol succinate 50 MG 24 hr tablet    mirtazapine 15 MG tablet    venlafaxine 37.5 MG tablet                Primary Care Provider Fax #    Physician No Ref-Primary [phone]       No address on file        Equal Access to Services     MARIAA CIFUENTES AH: Inder mazariegoso Sochaiali, waaxda luqadaha, qaybta kaalmada adeegyabren, coby kay. So [facility] [phone].    ATENCIÓN: Si habla español, tiene a echeverria disposición servicios gratuitos de asistencia lingüística. Llame al [phone].    We comply with applicable federal civil rights laws and [place] laws. We do not discriminate on the basis of race, color, national origin, age, disability, sex, sexual orientation, or gender identity.            Thank you!     Thank you for choosing [facility]  [facility] [place]  for your care. Our goal is always to provide you with excellent care. Hearing back from our patients is one way we can continue to improve our services. Please take a few minutes to complete the written survey that you may receive in the mail after your visit with us. Thank you!             Your Updated Medication List - Protect others around you: Learn how to safely use, store and throw away your medicines at www.disposemymeds.org.          This list is accurate as of 11/28/18  1:35 PM.  Always use your most recent med list.                   Brand Name Dispense Instructions for use Diagnosis    * esomeprazole 40 MG DR capsule    nexIUM     Take 20 mg by mouth every morning (before breakfast)        * esomeprazole 20 MG DR capsule    nexIUM    90 capsule    Take 1 capsule (20 mg) by mouth every morning (before breakfast) Take 30-60 minutes before eating.    Gastroesophageal reflux disease without esophagitis       LORazepam 0.5 MG tablet    ATIVAN    20 tablet    Take 1 tablet (0.5 mg) by mouth every 6 hours as needed for anxiety        metoprolol succinate 50 MG 24 hr tablet    TOPROL-XL    90 tablet    Take 0.5 tablets (25 mg) by mouth 2 times daily    Essential hypertension       mirtazapine 15 MG tablet    REMERON    90 tablet    Take 1 tablet (15 mg) by mouth At Bedtime    Anxiety       venlafaxine 37.5 MG tablet    EFFEXOR    90 tablet    TAKE 1/2 TABLET BY MOUTH TWICE DAILY    Anxiety       * Notice:  This list has 2 medication(s) that are the same as other medications prescribed for you. Read the directions carefully, and ask your doctor or other care provider to review them with you.

## 2018-11-28 NOTE — PATIENT INSTRUCTIONS
You will have lab work done today to evaluate your concerns.  You will also be scheduled for another MRI of the abdomen to follow up on the pancreatic lesion that was seen last year.  Switch your mirtazapine to 1 pill before bedtime rather than 1/2 pill twice daily.

## 2018-11-28 NOTE — NURSING NOTE
"Patient presents to the clinic today for a physical.  Patient brought a list today of questions and concerns. Patient states concerns with heart burn, chronic fatique, leg/ back pain, weight gain, sleep problems, medication review and memory.  Rajwinder Vargas LPN 11/28/2018   12:42 PM    Chief Complaint   Patient presents with     Physical       Initial /70 (BP Location: Right arm, Patient Position: Sitting, Cuff Size: Adult Regular)  Pulse 78  Wt 163 lb 14.4 oz (74.3 kg)  Breastfeeding? No  BMI 29.5 kg/m2 Estimated body mass index is 29.5 kg/(m^2) as calculated from the following:    Height as of 7/23/18: 5' 2.5\" (1.588 m).    Weight as of this encounter: 163 lb 14.4 oz (74.3 kg).  Medication Reconciliation: complete    Rajwinder Vargas    "

## 2018-11-28 NOTE — PROGRESS NOTES
Subjective:  She is here today for checkup on chronic disease.  She states that she is forgetful and is noticing this more frequently.  Her  wrote out a note of concerns and list of medications and sent with patient.  He did not attend the appointment.  She states that she has been taking her Remeron half tablet twice daily.  Also takes the Toprol-XL and Effexor and omeprazole.  She needs refills of these medication.  Her blood pressure has been well controlled.  She has history of a pancreatic lesion it was recommended by radiologist that follow-up MRI be completed this year.  She also states she has chronic fatigue and has noticed weight gain over the past few years.  By reviewing her chart it appears as though she has had elevated blood sugars but unknown her fasting status during time of blood draw.  She otherwise denies symptoms of diabetes.  She does have dysthymic disorder.  She has history of iron deficiency anemia and B12 anemia listed on her problem list but does not take iron or B12 supplements.  Hemoglobin with last checks have averaged from 10.9-11.3 over the past year.  She also reports that she has chronic low back pain and pain in the back of her legs and buttocks especially with standing up from a chair.  She has history of lumbar spinal stenosis.  Also had history of bilateral sacral fractures earlier in the year.  That pain has resolved.    Patient Active Problem List   Diagnosis     Abnormal CT of the abdomen     Anxiety state     B12 deficiency     Dysthymic disorder     Hypercholesterolemia     Hypertension     Iron deficiency anemia     Disorder of bone and cartilage     Spinal stenosis, lumbar     Bilateral carpal tunnel syndrome     Osteoarthrosis involving lower leg     Osteoarthrosis, unspecified whether generalized or localized, pelvic region and thigh     S/P right carpal tunnel surgery     Pancreatic lesion     Gastroesophageal reflux disease without esophagitis     Past Medical  History:   Diagnosis Date     ACP (advance care planning) 12/12/2013     Anemia, iron deficiency      Asymptomatic menopausal state     with mild symptoms     B12 deficiency 3/9/2012     Bilateral carpal tunnel syndrome 2/23/2018     Closed Colles' fracture of right radius with routine healing 04/13/2017     Depression with anxiety 02/01/2018    Overview:  Chronic anxiety     Disorder of bone and cartilage 2/1/2018     Epigastric abdominal pain     Resolved- 10/17/17     Flushing     Occasional hot flashes     Gastrointestinal disorder     resolved 10/17/17     Hip pain 12/16/2010    Left resolved-11/27/17     Hypercholesterolemia 2/1/2018     Hypertension 11/18/2010     Lipoma 07/03/2013    Resolved-10/17/17     Osteopenia      Personal history of other medical treatment (CODE)     G5, P4-0-1-4     Personal history of other medical treatment (CODE)     Hospitalization for tachycardia and PVCs     Plantar wart     No Comments Provided     Presence of other bone and tendon implants     4/21/2017     Primary osteoarthritis of one knee 11/06/2014     S/P right carpal tunnel surgery 4/25/2018     Sacroiliac joint dysfunction 01/28/2011    Resilved 10/17/17     Spinal stenosis, lumbar 2/1/2018    Overview:  Degenerative lumbar disc disease with lumbar spinal stenosis     Past Surgical History:   Procedure Laterality Date     ARTHROPLASTY HIP      3/9/09,Left, by Ashish Ivory M.D.     BLEPHAROPLASTY  04/18/2016    by Dr White Left brow lift     CERVICAL POLYPECTOMY      Endocervical polyps     CYSTOCELE REPAIR      Uterine prolapse and vaginal vault prolapse with cystocele     EXTRACAPSULAR CATARACT EXTRATION WITH INTRAOCULAR LENS IMPLANT  2010    Bilateral cataract extraction - Dr. White     hip arthroplasty  Right      HIP SURGERY Left 2012    Repair left hip / [place]     HYSTERECTOMY TOTAL ABDOMINAL      Rectocele     OTHER SURGICAL HISTORY  04/21/2017    Right,Synthes stainless steel variable angle volar radial  plate with T8 screw heads.     RELEASE CARPAL TUNNEL Right 4/25/2018    Procedure: RELEASE CARPAL TUNNEL;  Right Open Carpal Tunnel Release;  Surgeon: Gerald Martínez DO;  Location:  OR     S/P RIGHT CARPAL TUNNEL RELEASE Right 04/25/2018     SALPINGO-OOPHORECTOMY BILATERAL      4/16/98,Bilateral salpingo oophorectomy and vaginal vault suspension     Social History     Social History     Marital status:      Spouse name: N/A     Number of children: N/A     Years of education: N/A     Occupational History     Not on file.     Social History Main Topics     Smoking status: Former Smoker     Smokeless tobacco: Never Used     Alcohol use 1.2 oz/week      Comment: Alcoholic Drinks/day: 1 a night     Drug use: No     Sexual activity: Not on file     Other Topics Concern     Not on file     Social History Narrative    She is  with four grown children.  She is retired.       No tobacco.  Alcohol - infrequently.       - Eddi - cell #[phone]    Patient's cell/cabin # [phone]    Daughter - Renetta Sher    Daughter - Johanna Saucedo    Son - Joni Lockett, lives in [place], has problems with alcoholism.    Mituldaphne Preciado Cat - lives in [place], [place]    p 7/1/2013.     Family History   Problem Relation Age of Onset     Heart Disease Father      Heart Disease,CHF     Thyroid Disease Daughter      Thyroid Disease,Hypothyroid.     Blood Disease Daughter      Blood Disease,severe anemia with a hgb of less than 5     Family History Negative Son      Good Health     Family History Negative Daughter      Good Health     Thyroid Disease Daughter      Thyroid Disease,Hypothyroid.     Arthritis Other      Arthritis,Father's side has rheumatoid arthritis     Current Outpatient Prescriptions   Medication Sig Dispense Refill     esomeprazole (NEXIUM) 20 MG DR capsule Take 1 capsule (20 mg) by mouth every morning (before breakfast) Take 30-60 minutes before eating. 90 capsule 3     esomeprazole (NEXIUM) 40 MG CR capsule  Take 20 mg by mouth every morning (before breakfast)        LORazepam (ATIVAN) 0.5 MG tablet Take 1 tablet (0.5 mg) by mouth every 6 hours as needed for anxiety 20 tablet 0     metoprolol succinate (TOPROL-XL) 50 MG 24 hr tablet Take 0.5 tablets (25 mg) by mouth 2 times daily 90 tablet 3     mirtazapine (REMERON) 15 MG tablet Take 1 tablet (15 mg) by mouth At Bedtime 90 tablet 3     venlafaxine (EFFEXOR) 37.5 MG tablet TAKE 1/2 TABLET BY MOUTH TWICE DAILY 90 tablet 3     [DISCONTINUED] metoprolol succinate (TOPROL-XL) 50 MG 24 hr tablet Take 0.5 tablets (25 mg) by mouth 2 times daily 90 tablet 3     [DISCONTINUED] mirtazapine (REMERON) 15 MG tablet Take 1 tablet (15 mg) by mouth At Bedtime 90 tablet 3     [DISCONTINUED] venlafaxine (EFFEXOR) 37.5 MG tablet TAKE 1/2 TABLET BY MOUTH TWICE DAILY 90 tablet 3     [DISCONTINUED] venlafaxine (EFFEXOR-XR) 37.5 MG 24 hr capsule Take 1 capsule (37.5 mg) by mouth 2 times daily 90 capsule 3     Paroxetine      Review of Systems:  Review of Systems   Constitutional: Positive for activity change and fatigue. Negative for appetite change.   Respiratory: Negative for cough, shortness of breath and wheezing.    Cardiovascular: Negative for chest pain, palpitations and peripheral edema.   Gastrointestinal: Positive for heartburn. Negative for abdominal pain and hematochezia.        Relieved heartburn by taking Nexium   Endocrine: Negative.    Genitourinary: Negative for dysuria and hematuria.   Musculoskeletal: Positive for back pain.   Neurological: Negative for dizziness, weakness and light-headedness.   Psychiatric/Behavioral: Negative for agitation.        Dysthymia, difficulty sleeping, fatigue during the day and forgetfulness       Objective:   /70 (BP Location: Right arm, Patient Position: Sitting, Cuff Size: Adult Regular)  Pulse 78  Wt 163 lb 14.4 oz (74.3 kg)  Breastfeeding? No  BMI 29.5 kg/m2  Physical Exam   Constitutional: She is oriented to person, place, and  time. She appears well-developed. No distress.   HENT:   Mouth/Throat: Oropharynx is clear and moist. No oropharyngeal exudate.   Eyes: Conjunctivae are normal. No scleral icterus.   Neck: Neck supple. No thyromegaly present.   Cardiovascular: Normal rate and regular rhythm.  Exam reveals no gallop.    No murmur heard.  Pulmonary/Chest: Effort normal and breath sounds normal. She has no wheezes. She has no rales.   Abdominal: Soft. She exhibits no distension and no mass. There is no tenderness.   No palpable hepatosplenomegaly   Musculoskeletal: She exhibits no edema or tenderness.   Lymphadenopathy:     She has no cervical adenopathy.   Neurological: She is alert and oriented to person, place, and time. Coordination normal.   Skin: Skin is warm. No rash noted. She is not diaphoretic. No pallor.   Psychiatric: She has a normal mood and affect. Her behavior is normal.   Forgetful, repeats herself frequently throughout this appointment   Nursing note and vitals reviewed.  Laboratory and diagnostic studies over the last 2 years reviewed and discussed with patient    Assessment:    ICD-10-CM    1. Iron deficiency anemia, unspecified iron deficiency anemia type D50.9 CBC with platelets   2. B12 deficiency E53.8 CBC with platelets     Vitamin B12   3. Essential hypertension I10 Comprehensive metabolic panel   4. Dysthymic disorder F34.1    5. Pancreatic lesion K86.9 MR Abdomen w/o & w Contrast   6. Fatigue, unspecified type R53.83 Thyrotropin GH   7. Weight gain R63.5 Thyrotropin GH   8. Gastroesophageal reflux disease without esophagitis K21.9 esomeprazole (NEXIUM) 20 MG DR capsule   9. Forgetfulness R68.89    10. Hyperglycemia R73.9 Hemoglobin A1c   11. Benign essential hypertension I10 metoprolol succinate (TOPROL-XL) 50 MG 24 hr tablet   12. Anxiety F41.9 mirtazapine (REMERON) 15 MG tablet     venlafaxine (EFFEXOR) 37.5 MG tablet       Plan:   Patient has concerns of forgetfulness, weight gain and fatigue.  I think  this would help to take the Remeron 1 pill at bedtime rather than half tablet twice daily.  She has history of iron deficiency and B12 deficiency listed on her chart.  She is not on supplements.  She does not recall being told she had these diagnoses.  Her hemoglobin has been a little lower over the past year but stable.  Will check CBC, B12 levels.  Also will check TSH and chemistry panel.  She has had hyperglycemia as evaluated on previous labs so we will check A1c.  Also will set her up for MRI of the abdomen to follow-up on the pancreatic lesion as was recommended by radiologist last year.  She does have her forgetfulness.  Concerned she does have some dementia.  We will treatment we we are able to.  Blood pressure currently well controlled.  Heartburn well controlled.  Patient has dysthymia which is chronic.  Refills of medications are provided.    Addendum: Found the gastroenterology note from November 2017 regarding the pancreatic cyst.  It was recommended by the gastroenterologist at the [facility] that no further surveillance be completed due to her advanced age and that she would not be a candidate for resection if the area did continue to grow in size.  Patient was notified that MRI of the abdomen would be canceled and she was in agreement.    OLAYINKA Chairez   11/28/2018  1:27 PM

## 2018-12-10 ENCOUNTER — TELEPHONE (OUTPATIENT)
Dept: INTERNAL MEDICINE | Facility: OTHER | Age: 83
End: 2018-12-10

## 2018-12-10 NOTE — TELEPHONE ENCOUNTER
Patient's  sets up her pills and states the new medication from prescription written 11/28/18 looks different.  Pharmacist at [facility] states the patient renewed the old prescription (from Dr. Beyer prior to 07/16/18)  of Metoprolol Tartrate and did not receive new order for Metoprolol Succinate.  Patient had very vivid dreams last night after taking 1st dose of Metoprolol Succinate.  They are not sure if this is a side effect from the new medication.  Office visit scheduled for this Friday 12/14/18 with Mirna KATE.  Patient will stay on this new prescription until then or if side effects are noticed, she will call.    Kristen Allen LPN............12/10/2018 1:35 PM

## 2018-12-10 NOTE — TELEPHONE ENCOUNTER
States that dosage for metoprolol changed. Would like to know if it's supposed to be long acting or short acting.

## 2018-12-11 NOTE — TELEPHONE ENCOUNTER
According to our records, patient has always been prescribed the metoprolol succinate and not tartrate.  Nightmares and sleep disturbances can be a side effect of metoprolol succinate.

## 2018-12-14 ENCOUNTER — OFFICE VISIT (OUTPATIENT)
Dept: INTERNAL MEDICINE | Facility: OTHER | Age: 83
End: 2018-12-14
Attending: NURSE PRACTITIONER
Payer: COMMERCIAL

## 2018-12-14 VITALS
BODY MASS INDEX: 29.14 KG/M2 | SYSTOLIC BLOOD PRESSURE: 120 MMHG | DIASTOLIC BLOOD PRESSURE: 62 MMHG | WEIGHT: 161.9 LBS | HEART RATE: 82 BPM

## 2018-12-14 DIAGNOSIS — E53.8 B12 DEFICIENCY: ICD-10-CM

## 2018-12-14 DIAGNOSIS — E11.8 TYPE 2 DIABETES MELLITUS WITH COMPLICATION, WITHOUT LONG-TERM CURRENT USE OF INSULIN (H): ICD-10-CM

## 2018-12-14 DIAGNOSIS — K21.9 GASTROESOPHAGEAL REFLUX DISEASE WITHOUT ESOPHAGITIS: Primary | ICD-10-CM

## 2018-12-14 DIAGNOSIS — D50.9 IRON DEFICIENCY ANEMIA, UNSPECIFIED IRON DEFICIENCY ANEMIA TYPE: ICD-10-CM

## 2018-12-14 DIAGNOSIS — E53.8 FOLIC ACID DEFICIENCY: ICD-10-CM

## 2018-12-14 DIAGNOSIS — F41.9 ANXIETY: ICD-10-CM

## 2018-12-14 PROCEDURE — 99215 OFFICE O/P EST HI 40 MIN: CPT | Performed by: NURSE PRACTITIONER

## 2018-12-14 PROCEDURE — G0463 HOSPITAL OUTPT CLINIC VISIT: HCPCS

## 2018-12-14 RX ORDER — FERROUS SULFATE 325(65) MG
325 TABLET ORAL
Qty: 90 TABLET | Refills: 3 | Status: SHIPPED | OUTPATIENT
Start: 2018-12-14 | End: 2019-08-26

## 2018-12-14 RX ORDER — VENLAFAXINE 37.5 MG/1
TABLET ORAL
Qty: 45 TABLET | Refills: 3
Start: 2018-12-14 | End: 2019-11-26

## 2018-12-14 RX ORDER — MIRTAZAPINE 15 MG/1
7.5 TABLET, FILM COATED ORAL AT BEDTIME
Qty: 45 TABLET | Refills: 3
Start: 2018-12-14 | End: 2019-09-06

## 2018-12-14 RX ORDER — UBIDECARENONE 75 MG
100 CAPSULE ORAL DAILY
Qty: 90 TABLET | Refills: 3 | Status: SHIPPED | OUTPATIENT
Start: 2018-12-14 | End: 2019-01-25

## 2018-12-14 RX ORDER — FOLIC ACID 1 MG/1
1 TABLET ORAL DAILY
Qty: 90 TABLET | Refills: 3 | Status: SHIPPED | OUTPATIENT
Start: 2018-12-14 | End: 2019-01-25

## 2018-12-14 ASSESSMENT — ENCOUNTER SYMPTOMS
APPETITE CHANGE: 0
ABDOMINAL DISTENTION: 0
NAUSEA: 0
WEAKNESS: 0
ACTIVITY CHANGE: 0
DYSURIA: 0
HEARTBURN: 1
HEMATOCHEZIA: 0
PALPITATIONS: 0
HEMATURIA: 0
VOMITING: 0
DIAPHORESIS: 0
ABDOMINAL PAIN: 0
DIARRHEA: 0
UNEXPECTED WEIGHT CHANGE: 0
FLANK PAIN: 0
FEVER: 0
CHEST TIGHTNESS: 0
FATIGUE: 1
ENDOCRINE NEGATIVE: 1
ANAL BLEEDING: 0
DIZZINESS: 0
SHORTNESS OF BREATH: 0
DIFFICULTY URINATING: 0
CONSTIPATION: 0

## 2018-12-14 ASSESSMENT — PAIN SCALES - GENERAL: PAINLEVEL: NO PAIN (0)

## 2018-12-14 NOTE — NURSING NOTE
"Patient her for a follow up on lab work and medication management.   Irlanda Gonzalez LPN ..........12/14/2018 9:40 AM   Chief Complaint   Patient presents with     RECHECK     lab medication       Initial /62 (BP Location: Right arm, Patient Position: Sitting, Cuff Size: Adult Regular)   Pulse 82   Wt 73.4 kg (161 lb 14.4 oz)   BMI 29.14 kg/m   Estimated body mass index is 29.14 kg/m  as calculated from the following:    Height as of 7/23/18: 1.588 m (5' 2.5\").    Weight as of this encounter: 73.4 kg (161 lb 14.4 oz).  Medication Reconciliation: complete    Irlanda Gonzalez LPN    "

## 2018-12-14 NOTE — PATIENT INSTRUCTIONS
Start iron sulfate 325 mg once daily.  Start vitamin B12 100 mcg daily.  Start folic acid 1 mg daily.  Increase the nexium to 20 mg 2 pills in the morning 30 minutes before breakfast.  See me again in 6 weeks to have hemoglobin rechecked.  Reduce the amount of sweets and carbohydrates that you eat to improve your diabetes.  Also try to increase your exercise with walking to improve your diabetes.

## 2018-12-14 NOTE — PROGRESS NOTES
Subjective:  She is here today for a follow-up on abnormal labs.  Patient was evaluated in clinic on November 28 to establish care.  She was found to have a low hemoglobin, reduced iron, B12 and folic acid levels and also found to have type 2 diabetes.  She does report that she is always tired.  This is been for many months.  She also has heartburn frequently throughout the week.  She is a diet rich in sweets and carbohydrates.  She eats very little meat and leafy green vegetables.  Her  reports she gets very little exercise.  She rarely takes aspirin and NSAIDs.  She does take Nexium 20 mg daily.  She had been on 40 mg daily over a year ago but insurance changed and she had to purchase the over-the-counter tablets of started the 20 mg dose.  Since that time she is found that she has more frequent heartburn.  She has not noticed any hematemesis, epigastric discomfort, abdominal pain, black stools, rectal bleeding, hematuria, flank pain and suprapubic tenderness.  She has had complete hysterectomy.  She also denies symptoms of polyphagia, polydipsia and polyuria.      Patient Active Problem List   Diagnosis     Abnormal CT of the abdomen     Anxiety state     B12 deficiency     Dysthymic disorder     Hypercholesterolemia     Hypertension     Iron deficiency anemia     Disorder of bone and cartilage     Spinal stenosis, lumbar     Bilateral carpal tunnel syndrome     Osteoarthrosis involving lower leg     Osteoarthrosis, unspecified whether generalized or localized, pelvic region and thigh     S/P right carpal tunnel surgery     Pancreatic lesion     Gastroesophageal reflux disease without esophagitis     Folic acid deficiency     Type 2 diabetes mellitus with complication, without long-term current use of insulin (H)     Past Medical History:   Diagnosis Date     ACP (advance care planning) 12/12/2013     Anemia, iron deficiency      Asymptomatic menopausal state     with mild symptoms     B12 deficiency  3/9/2012     Bilateral carpal tunnel syndrome 2/23/2018     Closed Colles' fracture of right radius with routine healing 04/13/2017     Depression with anxiety 02/01/2018    Overview:  Chronic anxiety     Disorder of bone and cartilage 2/1/2018     Epigastric abdominal pain     Resolved- 10/17/17     Flushing     Occasional hot flashes     Gastrointestinal disorder     resolved 10/17/17     Hip pain 12/16/2010    Left resolved-11/27/17     Hypercholesterolemia 2/1/2018     Hypertension 11/18/2010     Lipoma 07/03/2013    Resolved-10/17/17     Osteopenia      Personal history of other medical treatment (CODE)     G5, P4-0-1-4     Personal history of other medical treatment (CODE)     Hospitalization for tachycardia and PVCs     Plantar wart     No Comments Provided     Presence of other bone and tendon implants     4/21/2017     Primary osteoarthritis of one knee 11/06/2014     S/P right carpal tunnel surgery 4/25/2018     Sacroiliac joint dysfunction 01/28/2011    Resilved 10/17/17     Spinal stenosis, lumbar 2/1/2018    Overview:  Degenerative lumbar disc disease with lumbar spinal stenosis     Past Surgical History:   Procedure Laterality Date     ARTHROPLASTY HIP      3/9/09,Left, by Ashish Ivory M.D.     BLEPHAROPLASTY  04/18/2016    by Dr White Left brow lift     CERVICAL POLYPECTOMY      Endocervical polyps     CYSTOCELE REPAIR      Uterine prolapse and vaginal vault prolapse with cystocele     EXTRACAPSULAR CATARACT EXTRATION WITH INTRAOCULAR LENS IMPLANT  2010    Bilateral cataract extraction - Dr. White     hip arthroplasty  Right      HIP SURGERY Left 2012    Repair left hip / [place]     HYSTERECTOMY TOTAL ABDOMINAL      Rectocele     OTHER SURGICAL HISTORY  04/21/2017    Right,Synthes stainless steel variable angle volar radial plate with T8 screw heads.     RELEASE CARPAL TUNNEL Right 4/25/2018    Procedure: RELEASE CARPAL TUNNEL;  Right Open Carpal Tunnel Release;  Surgeon: Gerald Martínez, DO;   Location: [facility] OR     S/P RIGHT CARPAL TUNNEL RELEASE Right 04/25/2018     SALPINGO-OOPHORECTOMY BILATERAL      4/16/98,Bilateral salpingo oophorectomy and vaginal vault suspension     Social History     Socioeconomic History     Marital status:      Spouse name: Not on file     Number of children: Not on file     Years of education: Not on file     Highest education level: Not on file   Social Needs     Financial resource strain: Not on file     Food insecurity - worry: Not on file     Food insecurity - inability: Not on file     Transportation needs - medical: Not on file     Transportation needs - non-medical: Not on file   Occupational History     Not on file   Tobacco Use     Smoking status: Former Smoker     Smokeless tobacco: Never Used   Substance and Sexual Activity     Alcohol use: Yes     Alcohol/week: 1.2 oz     Comment: Alcoholic Drinks/day: 1 a night     Drug use: No     Sexual activity: Not on file   Other Topics Concern     Parent/sibling w/ CABG, MI or angioplasty before 65F 55M? Not Asked   Social History Narrative    She is  with four grown children.  She is retired.       No tobacco.  Alcohol - infrequently.       - Eddi - cell #[phone]    Patient's cell/cabin # [phone]    Daughter - Renetta Sher    Daughter - Johanna Saucedo    Son - Joni Lockett, lives in [place], has problems with alcoholism.    Urszula - Cat - lives in [place], [place]    p 7/1/2013.     Family History   Problem Relation Age of Onset     Heart Disease Father         Heart Disease,CHF     Thyroid Disease Daughter         Thyroid Disease,Hypothyroid.     Blood Disease Daughter         Blood Disease,severe anemia with a hgb of less than 5     Family History Negative Son         Good Health     Family History Negative Daughter         Good Health     Thyroid Disease Daughter         Thyroid Disease,Hypothyroid.     Arthritis Other         Arthritis,Father's side has rheumatoid arthritis     Current Outpatient  Medications   Medication Sig Dispense Refill     esomeprazole (NEXIUM) 20 MG DR capsule Take 2 capsules (40 mg) by mouth every morning (before breakfast) Take 30-60 minutes before eating. 180 capsule 3     ferrous sulfate (FEROSUL) 325 (65 Fe) MG tablet Take 1 tablet (325 mg) by mouth daily (with breakfast) 90 tablet 3     folic acid (FOLVITE) 1 MG tablet Take 1 tablet (1 mg) by mouth daily 90 tablet 3     LORazepam (ATIVAN) 0.5 MG tablet Take 1 tablet (0.5 mg) by mouth every 6 hours as needed for anxiety 20 tablet 0     metoprolol succinate (TOPROL-XL) 50 MG 24 hr tablet Take 0.5 tablets (25 mg) by mouth 2 times daily 90 tablet 3     mirtazapine (REMERON) 15 MG tablet Take 0.5 tablets (7.5 mg) by mouth At Bedtime 45 tablet 3     venlafaxine (EFFEXOR) 37.5 MG tablet TAKE 1/2 TABLET BY MOUTH DAILY 45 tablet 3     vitamin B-12 (CYANOCOBALAMIN) 100 MCG tablet Take 1 tablet (100 mcg) by mouth daily 90 tablet 3     Paroxetine      Review of Systems:  Review of Systems   Constitutional: Positive for fatigue. Negative for activity change, appetite change, diaphoresis, fever and unexpected weight change.   Respiratory: Negative for chest tightness and shortness of breath.    Cardiovascular: Negative for chest pain and palpitations.   Gastrointestinal: Positive for heartburn. Negative for abdominal distention, abdominal pain, anal bleeding, constipation, diarrhea, hematochezia, nausea and vomiting.   Endocrine: Negative.    Genitourinary: Negative for difficulty urinating, dysuria, flank pain, hematuria and vaginal bleeding.   Neurological: Negative for dizziness and weakness.   Psychiatric/Behavioral:        Has difficulty with memory       Objective:   /62 (BP Location: Right arm, Patient Position: Sitting, Cuff Size: Adult Regular)   Pulse 82   Wt 73.4 kg (161 lb 14.4 oz)   BMI 29.14 kg/m    Physical Exam  Pleasant female no acute distress.  Affect normal.  Alert and oriented x4.  She is accompanied to this  appointment by her .  Sclera nonicteric.  Conjunctiva noninflamed.  Mucous membranes moist.  Neck supple and without adenopathy.  Lung fields clear to auscultation throughout.  Cardiovascular regular rate and rhythm.  Abdomen is soft and obese but without masses, tenderness and organomegaly.  No CVA or suprapubic tenderness.  Laboratory studies:  Hemoglobin trends-January 2017 12.5, June 2017 11.8, November 2017 11.1, February 2018 11.3, July 2018 10.9, and November 28, 2000 18 10.4 g/dL, ferritin decreased at 9, total iron decreased at 27, increased iron binding capacity, low iron saturation at 5%, folic acid low normal at 5.3, B12 low normal 233, A1c 6.5%  Normal thyroid studies.  No urinalysis.    Assessment:    ICD-10-CM    1. Gastroesophageal reflux disease without esophagitis K21.9 esomeprazole (NEXIUM) 20 MG DR capsule   2. B12 deficiency E53.8 vitamin B-12 (CYANOCOBALAMIN) 100 MCG tablet   3. Iron deficiency anemia, unspecified iron deficiency anemia type D50.9 ferrous sulfate (FEROSUL) 325 (65 Fe) MG tablet   4. Folic acid deficiency E53.8 folic acid (FOLVITE) 1 MG tablet   5. Type 2 diabetes mellitus with complication, without long-term current use of insulin (H) E11.8    6. Anxiety F41.9 venlafaxine (EFFEXOR) 37.5 MG tablet     mirtazapine (REMERON) 15 MG tablet       Plan:   Discussed with patient risk and benefits associated with further evaluation of causes of iron deficiency anemia which would include pan endoscopy, cystoscopy, etc.  Discussed that we could obtain a urinalysis and hemoccult then determine further workup.  Patient states that she does not want to do any further evaluation of the anemia as she would not want any type of endoscopy and would decline surgical intervention, chemotherapy and radiation if a cancerous reason was found.  Patient states that she is too old to evaluate anything further.  Options for treatment and evaluation are reviewed and discussed.  She does have  increased symptoms of heartburn since Nexium was reduced.  Will increase Nexium back to a total of 40 mg daily by having her take 20 mg 2 pills daily in the morning.  She will also start iron sulfate 325 mg daily, vitamin B12 100 mcg daily and folic acid 1 mg daily.  She will plan to follow-up again in 6 weeks to have hemoglobin rechecked and if all stable then follow-up in 3 months to have hemoglobin, iron studies, B12 and folic acid levels rechecked.  She is also newly diagnosed with diabetes.  She will change her diet by trying to add more meat and leafy green vegetables to help with her anemia needs but also to reduce her carbohydrate increase especially by reducing the large amount of sweets that she eats in a day and she will also try to increase her exercise by walking more during the day.  Her  is also in agreement with this plan.  I have also asked that she avoid aspirin and NSAIDs and if she needs something for discomfort to use acetaminophen.    Greater than 40 minutes face-to-face time spent with patient and  greater than 50% in care coordination and counseling, risk and disease management.    OLAYINKA Chairez   12/14/2018  10:23 AM

## 2019-01-25 ENCOUNTER — OFFICE VISIT (OUTPATIENT)
Dept: INTERNAL MEDICINE | Facility: OTHER | Age: 84
End: 2019-01-25
Attending: NURSE PRACTITIONER
Payer: COMMERCIAL

## 2019-01-25 VITALS
HEART RATE: 80 BPM | DIASTOLIC BLOOD PRESSURE: 80 MMHG | TEMPERATURE: 97 F | RESPIRATION RATE: 16 BRPM | HEIGHT: 63 IN | SYSTOLIC BLOOD PRESSURE: 138 MMHG | BODY MASS INDEX: 29.02 KG/M2 | WEIGHT: 163.8 LBS

## 2019-01-25 DIAGNOSIS — E53.8 FOLIC ACID DEFICIENCY: ICD-10-CM

## 2019-01-25 DIAGNOSIS — E11.8 TYPE 2 DIABETES MELLITUS WITH COMPLICATION, WITHOUT LONG-TERM CURRENT USE OF INSULIN (H): ICD-10-CM

## 2019-01-25 DIAGNOSIS — K21.9 GASTROESOPHAGEAL REFLUX DISEASE WITHOUT ESOPHAGITIS: ICD-10-CM

## 2019-01-25 DIAGNOSIS — M48.062 SPINAL STENOSIS OF LUMBAR REGION WITH NEUROGENIC CLAUDICATION: ICD-10-CM

## 2019-01-25 DIAGNOSIS — D50.9 IRON DEFICIENCY ANEMIA, UNSPECIFIED IRON DEFICIENCY ANEMIA TYPE: Primary | ICD-10-CM

## 2019-01-25 DIAGNOSIS — E53.8 B12 DEFICIENCY: ICD-10-CM

## 2019-01-25 LAB — HGB BLD-MCNC: 11.7 G/DL (ref 11.7–15.7)

## 2019-01-25 PROCEDURE — G0463 HOSPITAL OUTPT CLINIC VISIT: HCPCS

## 2019-01-25 PROCEDURE — 99214 OFFICE O/P EST MOD 30 MIN: CPT | Performed by: NURSE PRACTITIONER

## 2019-01-25 PROCEDURE — 36415 COLL VENOUS BLD VENIPUNCTURE: CPT | Performed by: NURSE PRACTITIONER

## 2019-01-25 PROCEDURE — 85018 HEMOGLOBIN: CPT | Performed by: NURSE PRACTITIONER

## 2019-01-25 RX ORDER — FOLIC ACID 1 MG/1
1 TABLET ORAL DAILY
Qty: 90 TABLET | Refills: 3 | Status: SHIPPED | OUTPATIENT
Start: 2019-01-25 | End: 2019-08-12

## 2019-01-25 RX ORDER — MULTIVIT-MIN/IRON FUM/FOLIC AC 7.5 MG-4
TABLET ORAL
COMMUNITY
Start: 2019-01-25 | End: 2019-08-26

## 2019-01-25 RX ORDER — UBIDECARENONE 75 MG
100 CAPSULE ORAL DAILY
Qty: 90 TABLET | Refills: 3 | Status: SHIPPED | OUTPATIENT
Start: 2019-01-25 | End: 2019-08-12

## 2019-01-25 ASSESSMENT — MIFFLIN-ST. JEOR: SCORE: 1139.18

## 2019-01-25 ASSESSMENT — PATIENT HEALTH QUESTIONNAIRE - PHQ9: SUM OF ALL RESPONSES TO PHQ QUESTIONS 1-9: 0

## 2019-01-25 NOTE — PROGRESS NOTES
Subjective:  She is here today for follow-up on anemia.  She was seen back in November and at that time was found to have anemia secondary to iron, B12 and folic acid deficiency.  She was started on iron sulfate daily, B12 once daily and folic acid once daily.  She was having heartburn symptoms since her Nexium was reduced down to 20 mg therefore it was increased to 40 mg and the heartburn has resolved.  She has not had any dark stools, rectal bleeding, hematemesis, nausea nor hematuria.  She declined to have any additional workup of the anemia.  She was also found to have mild type 2 diabetes.  Her diet was poor nutrition and rich in fats and contents carbohydrates and she got little exercise.  She and her  tell me that she still is eating carbohydrates but has reduce the amount of sweets that she takes in during the day and is also going to [employer] several days per week and walking to get exercise.  She also adds that she does have some intermittent pain down the right leg and at the low back.  This comes and goes.  Seems to be worse if she is been doing more sitting but actually gets a little better with more exercise.  She has lumbar spinal stenosis with claudication for history.  She denies falls and weakness.    Patient Active Problem List   Diagnosis     Abnormal CT of the abdomen     Anxiety state     B12 deficiency     Dysthymic disorder     Hypercholesterolemia     Hypertension     Iron deficiency anemia     Disorder of bone and cartilage     Spinal stenosis, lumbar     Bilateral carpal tunnel syndrome     Osteoarthrosis involving lower leg     Osteoarthrosis, unspecified whether generalized or localized, pelvic region and thigh     S/P right carpal tunnel surgery     Pancreatic lesion     Gastroesophageal reflux disease without esophagitis     Folic acid deficiency     Type 2 diabetes mellitus with complication, without long-term current use of insulin (H)     Past Medical History:   Diagnosis Date      ACP (advance care planning) 12/12/2013     Anemia, iron deficiency      Asymptomatic menopausal state     with mild symptoms     B12 deficiency 3/9/2012     Bilateral carpal tunnel syndrome 2/23/2018     Closed Colles' fracture of right radius with routine healing 04/13/2017     Depression with anxiety 02/01/2018    Overview:  Chronic anxiety     Disorder of bone and cartilage 2/1/2018     Epigastric abdominal pain     Resolved- 10/17/17     Flushing     Occasional hot flashes     Gastrointestinal disorder     resolved 10/17/17     Hip pain 12/16/2010    Left resolved-11/27/17     Hypercholesterolemia 2/1/2018     Hypertension 11/18/2010     Lipoma 07/03/2013    Resolved-10/17/17     Osteopenia      Personal history of other medical treatment (CODE)     G5, P4-0-1-4     Personal history of other medical treatment (CODE)     Hospitalization for tachycardia and PVCs     Plantar wart     No Comments Provided     Presence of other bone and tendon implants     4/21/2017     Primary osteoarthritis of one knee 11/06/2014     S/P right carpal tunnel surgery 4/25/2018     Sacroiliac joint dysfunction 01/28/2011    Resilved 10/17/17     Spinal stenosis, lumbar 2/1/2018    Overview:  Degenerative lumbar disc disease with lumbar spinal stenosis     Past Surgical History:   Procedure Laterality Date     ARTHROPLASTY HIP      3/9/09,Left, by Ashish Ivory M.D.     BLEPHAROPLASTY  04/18/2016    by Dr White Left brow lift     CERVICAL POLYPECTOMY      Endocervical polyps     CYSTOCELE REPAIR      Uterine prolapse and vaginal vault prolapse with cystocele     EXTRACAPSULAR CATARACT EXTRATION WITH INTRAOCULAR LENS IMPLANT  2010    Bilateral cataract extraction - Dr. White     hip arthroplasty  Right      HIP SURGERY Left 2012    Repair left hip / [place]     HYSTERECTOMY TOTAL ABDOMINAL      Rectocele     OTHER SURGICAL HISTORY  04/21/2017    Right,Synthes stainless steel variable angle volar radial plate with T8 screw heads.      RELEASE CARPAL TUNNEL Right 4/25/2018    Procedure: RELEASE CARPAL TUNNEL;  Right Open Carpal Tunnel Release;  Surgeon: Gerald Martínez DO;  Location: [facility] OR     S/P RIGHT CARPAL TUNNEL RELEASE Right 04/25/2018     SALPINGO-OOPHORECTOMY BILATERAL      4/16/98,Bilateral salpingo oophorectomy and vaginal vault suspension     Social History     Socioeconomic History     Marital status:      Spouse name: Not on file     Number of children: Not on file     Years of education: Not on file     Highest education level: Not on file   Social Needs     Financial resource strain: Not on file     Food insecurity - worry: Not on file     Food insecurity - inability: Not on file     Transportation needs - medical: Not on file     Transportation needs - non-medical: Not on file   Occupational History     Not on file   Tobacco Use     Smoking status: Former Smoker     Smokeless tobacco: Never Used   Substance and Sexual Activity     Alcohol use: Yes     Alcohol/week: 1.2 oz     Comment: Alcoholic Drinks/day: 1 a night     Drug use: No     Sexual activity: Not on file   Other Topics Concern     Parent/sibling w/ CABG, MI or angioplasty before 65F 55M? Not Asked   Social History Narrative    She is  with four grown children.  She is retired.       No tobacco.  Alcohol - infrequently.       - Eddi - cell #[phone]    Patient's cell/cabin # [phone]    Daughter - Renetta Sher    Daughter - Johanna Saucedo    Son - Joni Lockett, lives in [place], has problems with alcoholism.    Mitullittlenella Shelton - lives in [place], [place]    p 7/1/2013.     Family History   Problem Relation Age of Onset     Heart Disease Father         Heart Disease,CHF     Thyroid Disease Daughter         Thyroid Disease,Hypothyroid.     Blood Disease Daughter         Blood Disease,severe anemia with a hgb of less than 5     Family History Negative Son         Good Health     Family History Negative Daughter         Good Health     Thyroid Disease  "Daughter         Thyroid Disease,Hypothyroid.     Arthritis Other         Arthritis,Father's side has rheumatoid arthritis     Current Outpatient Medications   Medication Sig Dispense Refill     esomeprazole (NEXIUM) 20 MG DR capsule Take 2 capsules (40 mg) by mouth every morning (before breakfast) Take 30-60 minutes before eating. 180 capsule 3     ferrous sulfate (FEROSUL) 325 (65 Fe) MG tablet Take 1 tablet (325 mg) by mouth daily (with breakfast) 90 tablet 3     folic acid (FOLVITE) 1 MG tablet Take 1 tablet (1 mg) by mouth daily 90 tablet 3     LORazepam (ATIVAN) 0.5 MG tablet Take 1 tablet (0.5 mg) by mouth every 6 hours as needed for anxiety 20 tablet 0     metoprolol succinate (TOPROL-XL) 50 MG 24 hr tablet Take 0.5 tablets (25 mg) by mouth 2 times daily 90 tablet 3     mirtazapine (REMERON) 15 MG tablet Take 0.5 tablets (7.5 mg) by mouth At Bedtime 45 tablet 3     multivitamins w/minerals tablet Take 1 tablet by mouth daily   Has 12 mcg of vitamin b 12   Has 400 mcg of folic acid       venlafaxine (EFFEXOR) 37.5 MG tablet TAKE 1/2 TABLET BY MOUTH DAILY 45 tablet 3     vitamin B-12 (CYANOCOBALAMIN) 100 MCG tablet Take 1 tablet (100 mcg) by mouth daily 90 tablet 3     Paroxetine      Review of Systems:  Review of Systems  See HPI    Objective:   /80 (BP Location: Right arm, Patient Position: Chair, Cuff Size: Adult Regular)   Pulse 80   Temp 97  F (36.1  C) (Tympanic)   Resp 16   Ht 1.6 m (5' 3\")   Wt 74.3 kg (163 lb 12.8 oz)   Breastfeeding? No   BMI 29.02 kg/m    Physical Exam  Pleasant female no acute distress.  Affect normal.  Alert and oriented x4.  Skin color pink.  Mucous members moist.  Neck supple without adenopathy.  Lung fields clear to auscultation.  Cardiovascular regular rate and rhythm.  Abdomen is without tenderness, masses and organomegaly.  Gait is stable.    Assessment:    ICD-10-CM    1. Iron deficiency anemia, unspecified iron deficiency anemia type D50.9 Hemoglobin     " Hemoglobin   2. B12 deficiency E53.8 Hemoglobin     Hemoglobin     vitamin B-12 (CYANOCOBALAMIN) 100 MCG tablet   3. Folic acid deficiency E53.8 Hemoglobin     Hemoglobin     folic acid (FOLVITE) 1 MG tablet   4. Gastroesophageal reflux disease without esophagitis K21.9 Hemoglobin     Hemoglobin   5. Type 2 diabetes mellitus with complication, without long-term current use of insulin (H) E11.8    6. Spinal stenosis of lumbar region with neurogenic claudication M48.062        Plan:   1.  Today we will check hemoglobin.  Continue with iron sulfate, B12 and folic acid daily.  She continues to decline additional workup of the anemia.  We will plan to see her back in 2 months and at that time check iron panel, ferritin, B12 and folic acid.  2.  Continue Nexium 20 mg 2 pills daily since heartburn has resolved.    3.  Recommend that she try to continue with her increased exercise to help with the lumbar spinal stenosis.  The pain is intermittent.  Also offered physical therapy, she will center and let me know if she would like me to place an order.    OLAYINKA Chairez   1/25/2019  10:40 AM

## 2019-02-05 ENCOUNTER — MYC MEDICAL ADVICE (OUTPATIENT)
Dept: INTERNAL MEDICINE | Facility: OTHER | Age: 84
End: 2019-02-05

## 2019-03-25 ENCOUNTER — OFFICE VISIT (OUTPATIENT)
Dept: INTERNAL MEDICINE | Facility: OTHER | Age: 84
End: 2019-03-25
Attending: NURSE PRACTITIONER
Payer: MEDICARE

## 2019-03-25 VITALS
SYSTOLIC BLOOD PRESSURE: 138 MMHG | HEART RATE: 80 BPM | RESPIRATION RATE: 12 BRPM | TEMPERATURE: 97.8 F | WEIGHT: 157.6 LBS | BODY MASS INDEX: 28.37 KG/M2 | DIASTOLIC BLOOD PRESSURE: 82 MMHG

## 2019-03-25 DIAGNOSIS — E53.8 B12 DEFICIENCY: ICD-10-CM

## 2019-03-25 DIAGNOSIS — E53.8 FOLIC ACID DEFICIENCY: ICD-10-CM

## 2019-03-25 DIAGNOSIS — K21.9 GASTROESOPHAGEAL REFLUX DISEASE WITHOUT ESOPHAGITIS: ICD-10-CM

## 2019-03-25 DIAGNOSIS — D50.9 IRON DEFICIENCY ANEMIA, UNSPECIFIED IRON DEFICIENCY ANEMIA TYPE: Primary | ICD-10-CM

## 2019-03-25 DIAGNOSIS — E11.8 TYPE 2 DIABETES MELLITUS WITH COMPLICATION, WITHOUT LONG-TERM CURRENT USE OF INSULIN (H): ICD-10-CM

## 2019-03-25 LAB
ERYTHROCYTE [DISTWIDTH] IN BLOOD BY AUTOMATED COUNT: 18.7 % (ref 10–15)
FERRITIN SERPL-MCNC: 32 NG/ML (ref 23.9–336.2)
FOLATE SERPL-MCNC: 19.4 NG/ML
HBA1C MFR BLD: 6.2 % (ref 4–6)
HCT VFR BLD AUTO: 43 % (ref 35–47)
HGB BLD-MCNC: 13.3 G/DL (ref 11.7–15.7)
IRON SATN MFR SERPL: 20 % (ref 20–55)
IRON SERPL-MCNC: 74 UG/DL (ref 50–212)
MCH RBC QN AUTO: 27.7 PG (ref 26.5–33)
MCHC RBC AUTO-ENTMCNC: 30.9 G/DL (ref 31.5–36.5)
MCV RBC AUTO: 90 FL (ref 78–100)
PLATELET # BLD AUTO: 176 10E9/L (ref 150–450)
RBC # BLD AUTO: 4.8 10E12/L (ref 3.8–5.2)
TIBC SERPL-MCNC: 373.8 UG/DL (ref 245–400)
UIBC (UNSATURATED): 299.8 MG/DL
VIT B12 SERPL-MCNC: 311 PG/ML (ref 180–914)
WBC # BLD AUTO: 5.3 10E9/L (ref 4–11)

## 2019-03-25 PROCEDURE — 82728 ASSAY OF FERRITIN: CPT | Performed by: NURSE PRACTITIONER

## 2019-03-25 PROCEDURE — 83036 HEMOGLOBIN GLYCOSYLATED A1C: CPT | Performed by: NURSE PRACTITIONER

## 2019-03-25 PROCEDURE — 99213 OFFICE O/P EST LOW 20 MIN: CPT | Performed by: NURSE PRACTITIONER

## 2019-03-25 PROCEDURE — 36415 COLL VENOUS BLD VENIPUNCTURE: CPT | Performed by: NURSE PRACTITIONER

## 2019-03-25 PROCEDURE — 85027 COMPLETE CBC AUTOMATED: CPT | Performed by: NURSE PRACTITIONER

## 2019-03-25 PROCEDURE — 82607 VITAMIN B-12: CPT | Performed by: NURSE PRACTITIONER

## 2019-03-25 PROCEDURE — 83540 ASSAY OF IRON: CPT | Performed by: NURSE PRACTITIONER

## 2019-03-25 PROCEDURE — 83550 IRON BINDING TEST: CPT | Performed by: NURSE PRACTITIONER

## 2019-03-25 PROCEDURE — 82746 ASSAY OF FOLIC ACID SERUM: CPT | Performed by: NURSE PRACTITIONER

## 2019-03-25 PROCEDURE — G0463 HOSPITAL OUTPT CLINIC VISIT: HCPCS

## 2019-03-25 ASSESSMENT — PAIN SCALES - GENERAL: PAINLEVEL: NO PAIN (0)

## 2019-03-25 ASSESSMENT — PATIENT HEALTH QUESTIONNAIRE - PHQ9: SUM OF ALL RESPONSES TO PHQ QUESTIONS 1-9: 0

## 2019-03-25 NOTE — PROGRESS NOTES
Subjective:  She is here today for a follow-up on anemia.  She is taking iron once daily, B12 and folic acid daily.  She is feeling better.  She was having problems with some heartburn and Nexium was increased to 40 mg daily and no longer having any problems with heartburn.  She also was diagnosed with type 2 diabetes back in November.  A1c was 6.5%.  Most of her diet consumption was of carbohydrates.  She has tried changing this but does admit that she still has carbohydrates in the morning and has a bowl of ice cream before bed.  She does not check her blood sugars.  Denies weakness, fatigue, dark stools, rectal bleeding, hematemesis, heartburn, paresthesias in her hands and feet and diabetic ulcers on her feet.    Patient Active Problem List   Diagnosis     Abnormal CT of the abdomen     Anxiety state     B12 deficiency     Dysthymic disorder     Hypercholesterolemia     Hypertension     Iron deficiency anemia     Disorder of bone and cartilage     Spinal stenosis, lumbar     Bilateral carpal tunnel syndrome     Osteoarthrosis involving lower leg     Osteoarthrosis, unspecified whether generalized or localized, pelvic region and thigh     S/P right carpal tunnel surgery     Pancreatic lesion     Gastroesophageal reflux disease without esophagitis     Folic acid deficiency     Type 2 diabetes mellitus with complication, without long-term current use of insulin (H)     Past Medical History:   Diagnosis Date     ACP (advance care planning) 12/12/2013     Anemia, iron deficiency      Asymptomatic menopausal state     with mild symptoms     B12 deficiency 3/9/2012     Bilateral carpal tunnel syndrome 2/23/2018     Closed Colles' fracture of right radius with routine healing 04/13/2017     Depression with anxiety 02/01/2018    Overview:  Chronic anxiety     Disorder of bone and cartilage 2/1/2018     Epigastric abdominal pain     Resolved- 10/17/17     Flushing     Occasional hot flashes     Gastrointestinal disorder      resolved 10/17/17     Hip pain 12/16/2010    Left resolved-11/27/17     Hypercholesterolemia 2/1/2018     Hypertension 11/18/2010     Lipoma 07/03/2013    Resolved-10/17/17     Osteopenia      Personal history of other medical treatment (CODE)     G5, P4-0-1-4     Personal history of other medical treatment (CODE)     Hospitalization for tachycardia and PVCs     Plantar wart     No Comments Provided     Presence of other bone and tendon implants     4/21/2017     Primary osteoarthritis of one knee 11/06/2014     S/P right carpal tunnel surgery 4/25/2018     Sacroiliac joint dysfunction 01/28/2011    Resilved 10/17/17     Spinal stenosis, lumbar 2/1/2018    Overview:  Degenerative lumbar disc disease with lumbar spinal stenosis     Past Surgical History:   Procedure Laterality Date     ARTHROPLASTY HIP      3/9/09,Left, by Ashish Ivory M.D.     BLEPHAROPLASTY  04/18/2016    by Dr White Left brow lift     CERVICAL POLYPECTOMY      Endocervical polyps     CYSTOCELE REPAIR      Uterine prolapse and vaginal vault prolapse with cystocele     EXTRACAPSULAR CATARACT EXTRATION WITH INTRAOCULAR LENS IMPLANT  2010    Bilateral cataract extraction - Dr. White     hip arthroplasty  Right      HIP SURGERY Left 2012    Repair left hip / [place]     HYSTERECTOMY TOTAL ABDOMINAL      Rectocele     OTHER SURGICAL HISTORY  04/21/2017    Right,Synthes stainless steel variable angle volar radial plate with T8 screw heads.     RELEASE CARPAL TUNNEL Right 4/25/2018    Procedure: RELEASE CARPAL TUNNEL;  Right Open Carpal Tunnel Release;  Surgeon: Gerald Martínez DO;  Location: [facility] OR     S/P RIGHT CARPAL TUNNEL RELEASE Right 04/25/2018     SALPINGO-OOPHORECTOMY BILATERAL      4/16/98,Bilateral salpingo oophorectomy and vaginal vault suspension     Social History     Socioeconomic History     Marital status:      Spouse name: Not on file     Number of children: Not on file     Years of education: Not on file     Highest  education level: Not on file   Occupational History     Not on file   Social Needs     Financial resource strain: Not on file     Food insecurity:     Worry: Not on file     Inability: Not on file     Transportation needs:     Medical: Not on file     Non-medical: Not on file   Tobacco Use     Smoking status: Former Smoker     Smokeless tobacco: Never Used   Substance and Sexual Activity     Alcohol use: Yes     Alcohol/week: 1.2 oz     Comment: Alcoholic Drinks/day: 1 a night     Drug use: No     Sexual activity: Not on file   Lifestyle     Physical activity:     Days per week: Not on file     Minutes per session: Not on file     Stress: Not on file   Relationships     Social connections:     Talks on phone: Not on file     Gets together: Not on file     Attends Rastafarian service: Not on file     Active member of club or organization: Not on file     Attends meetings of clubs or organizations: Not on file     Relationship status: Not on file     Intimate partner violence:     Fear of current or ex partner: Not on file     Emotionally abused: Not on file     Physically abused: Not on file     Forced sexual activity: Not on file   Other Topics Concern     Parent/sibling w/ CABG, MI or angioplasty before 65F 55M? Not Asked   Social History Narrative    She is  with four grown children.  She is retired.       No tobacco.  Alcohol - infrequently.       - Eddi - cell #[phone]    Patient's cell/cabin # [phone]    Daughter - Renetta Sher    Daughter - Johanna Saucedo    Son - Joni Lockett, lives in [place], has problems with alcoholism.    Urszula Shelton - lives in [place], [place]    p 7/1/2013.     Family History   Problem Relation Age of Onset     Heart Disease Father         Heart Disease,CHF     Thyroid Disease Daughter         Thyroid Disease,Hypothyroid.     Blood Disease Daughter         Blood Disease,severe anemia with a hgb of less than 5     Family History Negative Son         Good Health     Family  History Negative Daughter         Good Health     Thyroid Disease Daughter         Thyroid Disease,Hypothyroid.     Arthritis Other         Arthritis,Father's side has rheumatoid arthritis     Current Outpatient Medications   Medication Sig Dispense Refill     esomeprazole (NEXIUM) 20 MG DR capsule Take 2 capsules (40 mg) by mouth every morning (before breakfast) Take 30-60 minutes before eating. 180 capsule 3     ferrous sulfate (FEROSUL) 325 (65 Fe) MG tablet Take 1 tablet (325 mg) by mouth daily (with breakfast) 90 tablet 3     folic acid (FOLVITE) 1 MG tablet Take 1 tablet (1 mg) by mouth daily 90 tablet 3     LORazepam (ATIVAN) 0.5 MG tablet Take 1 tablet (0.5 mg) by mouth every 6 hours as needed for anxiety 20 tablet 0     metoprolol succinate (TOPROL-XL) 50 MG 24 hr tablet Take 0.5 tablets (25 mg) by mouth 2 times daily 90 tablet 3     mirtazapine (REMERON) 15 MG tablet Take 0.5 tablets (7.5 mg) by mouth At Bedtime 45 tablet 3     multivitamins w/minerals tablet Take 1 tablet by mouth daily   Has 12 mcg of vitamin b 12   Has 400 mcg of folic acid       venlafaxine (EFFEXOR) 37.5 MG tablet TAKE 1/2 TABLET BY MOUTH DAILY 45 tablet 3     vitamin B-12 (CYANOCOBALAMIN) 100 MCG tablet Take 1 tablet (100 mcg) by mouth daily 90 tablet 3     Paroxetine      Review of Systems:  Review of Systems  See HPI    Objective:   /82 (BP Location: Right arm, Patient Position: Chair, Cuff Size: Adult Regular)   Pulse 80   Temp 97.8  F (36.6  C) (Tympanic)   Resp 12   Wt 71.5 kg (157 lb 9.6 oz)   Breastfeeding? No   BMI 28.37 kg/m    Physical Exam  Pleasant female in no acute distress.  Affect normal.  Alert and oriented x4.  Skin color pink.  Mucous memories moist.  Neck supple without adenopathy.  Lung fields clear to auscultation.  Cardiovascular regular rate and rhythm    Assessment:    ICD-10-CM    1. Iron deficiency anemia, unspecified iron deficiency anemia type D50.9 CBC with platelets     Iron Binding Panel [facility]      Ferritin   2. B12 deficiency E53.8 CBC with platelets     Vitamin B12   3. Folic acid deficiency E53.8 CBC with platelets     Folate   4. Gastroesophageal reflux disease without esophagitis K21.9 CBC with platelets   5. Type 2 diabetes mellitus with complication, without long-term current use of insulin (H) E11.8 Hemoglobin A1c       Plan:   1.  We will check labs today to include CBC, iron panel, ferritin, B12 and folic acid.  She will remain on the B12 and folic acid lifelong.  If iron levels have improved then will consider having her stop the iron.  She will be contacted with results.  2.  Continue Nexium 40 mg daily.  3.  Low carbohydrate diet encouraged, check A1c today.    OLAYINKA Chairez   3/25/2019  10:19 AM

## 2019-03-25 NOTE — NURSING NOTE
"Previous A1C is at goal of <8        Lab Results   Component Value Date     A1C 6.5 11/28/2018      Urine microalbumin:creatine: n/a  Foot exam yearly  Eye exam yearly    Tobacco User no  Patient is not on a daily aspirin  Patient is not on a Statin.      Chief Complaint   Patient presents with     Follow Up     Follow Up Anemia        Initial /82 (BP Location: Right arm, Patient Position: Chair, Cuff Size: Adult Regular)   Pulse 80   Temp 97.8  F (36.6  C) (Tympanic)   Resp 12   Wt 71.5 kg (157 lb 9.6 oz)   Breastfeeding? No   BMI 28.37 kg/m   Estimated body mass index is 28.37 kg/m  as calculated from the following:    Height as of 1/25/19: 1.588 m (5' 2.5\").    Weight as of this encounter: 71.5 kg (157 lb 9.6 oz).  Medication Reconciliation:complete      Emilee English LPN on 3/25/2019 at 9:57 AM    "

## 2019-05-22 ENCOUNTER — DOCUMENTATION ONLY (OUTPATIENT)
Dept: OTHER | Facility: CLINIC | Age: 84
End: 2019-05-22

## 2019-08-12 ENCOUNTER — OFFICE VISIT (OUTPATIENT)
Dept: INTERNAL MEDICINE | Facility: OTHER | Age: 84
End: 2019-08-12
Attending: INTERNAL MEDICINE
Payer: COMMERCIAL

## 2019-08-12 VITALS
WEIGHT: 157 LBS | HEIGHT: 63 IN | SYSTOLIC BLOOD PRESSURE: 139 MMHG | HEART RATE: 76 BPM | BODY MASS INDEX: 27.82 KG/M2 | RESPIRATION RATE: 18 BRPM | TEMPERATURE: 97.4 F | DIASTOLIC BLOOD PRESSURE: 86 MMHG

## 2019-08-12 DIAGNOSIS — D64.9 ANEMIA, UNSPECIFIED TYPE: ICD-10-CM

## 2019-08-12 DIAGNOSIS — E53.8 FOLIC ACID DEFICIENCY: ICD-10-CM

## 2019-08-12 DIAGNOSIS — R93.5 ABNORMAL CT OF THE ABDOMEN: ICD-10-CM

## 2019-08-12 DIAGNOSIS — E11.8 TYPE 2 DIABETES MELLITUS WITH COMPLICATION, WITHOUT LONG-TERM CURRENT USE OF INSULIN (H): ICD-10-CM

## 2019-08-12 DIAGNOSIS — I10 ESSENTIAL HYPERTENSION: ICD-10-CM

## 2019-08-12 DIAGNOSIS — R41.89 COGNITIVE DECLINE: ICD-10-CM

## 2019-08-12 DIAGNOSIS — K86.9 PANCREATIC LESION: ICD-10-CM

## 2019-08-12 DIAGNOSIS — M48.062 SPINAL STENOSIS OF LUMBAR REGION WITH NEUROGENIC CLAUDICATION: ICD-10-CM

## 2019-08-12 DIAGNOSIS — K21.9 GASTROESOPHAGEAL REFLUX DISEASE WITHOUT ESOPHAGITIS: ICD-10-CM

## 2019-08-12 DIAGNOSIS — E53.8 B12 DEFICIENCY: Primary | ICD-10-CM

## 2019-08-12 PROBLEM — G56.03 BILATERAL CARPAL TUNNEL SYNDROME: Status: RESOLVED | Noted: 2018-02-23 | Resolved: 2019-08-12

## 2019-08-12 PROBLEM — Z98.890 HISTORY OF CARPAL TUNNEL SURGERY: Status: RESOLVED | Noted: 2018-04-25 | Resolved: 2019-08-12

## 2019-08-12 LAB
ALBUMIN SERPL-MCNC: 4 G/DL (ref 3.5–5.7)
ALP SERPL-CCNC: 61 U/L (ref 34–104)
ALT SERPL W P-5'-P-CCNC: 15 U/L (ref 7–52)
ANION GAP SERPL CALCULATED.3IONS-SCNC: 7 MMOL/L (ref 3–14)
AST SERPL W P-5'-P-CCNC: 17 U/L (ref 13–39)
BILIRUB SERPL-MCNC: 0.5 MG/DL (ref 0.3–1)
BUN SERPL-MCNC: 22 MG/DL (ref 7–25)
CALCIUM SERPL-MCNC: 9.8 MG/DL (ref 8.6–10.3)
CHLORIDE SERPL-SCNC: 106 MMOL/L (ref 98–107)
CO2 SERPL-SCNC: 27 MMOL/L (ref 21–31)
CREAT SERPL-MCNC: 0.99 MG/DL (ref 0.6–1.2)
ERYTHROCYTE [DISTWIDTH] IN BLOOD BY AUTOMATED COUNT: 13.4 % (ref 10–15)
FOLATE SERPL-MCNC: >24.8 NG/ML
GFR SERPL CREATININE-BSD FRML MDRD: 53 ML/MIN/{1.73_M2}
GLUCOSE SERPL-MCNC: 103 MG/DL (ref 70–105)
HCT VFR BLD AUTO: 45.4 % (ref 35–47)
HGB BLD-MCNC: 14.4 G/DL (ref 11.7–15.7)
IRON SATN MFR SERPL: 38 % (ref 20–55)
IRON SERPL-MCNC: 148 UG/DL (ref 50–212)
MCH RBC QN AUTO: 30.9 PG (ref 26.5–33)
MCHC RBC AUTO-ENTMCNC: 31.7 G/DL (ref 31.5–36.5)
MCV RBC AUTO: 97 FL (ref 78–100)
PLATELET # BLD AUTO: 163 10E9/L (ref 150–450)
POTASSIUM SERPL-SCNC: 4.2 MMOL/L (ref 3.5–5.1)
PROT SERPL-MCNC: 6.9 G/DL (ref 6.4–8.9)
RBC # BLD AUTO: 4.66 10E12/L (ref 3.8–5.2)
SODIUM SERPL-SCNC: 140 MMOL/L (ref 134–144)
TIBC SERPL-MCNC: 392 UG/DL (ref 245–400)
TSH SERPL DL<=0.05 MIU/L-ACNC: 3.2 IU/ML (ref 0.34–5.6)
UIBC (UNSATURATED): 244 MG/DL
VIT B12 SERPL-MCNC: 352 PG/ML (ref 180–914)
WBC # BLD AUTO: 6.3 10E9/L (ref 4–11)

## 2019-08-12 PROCEDURE — 83550 IRON BINDING TEST: CPT | Mod: ZL | Performed by: INTERNAL MEDICINE

## 2019-08-12 PROCEDURE — 36415 COLL VENOUS BLD VENIPUNCTURE: CPT | Mod: ZL | Performed by: INTERNAL MEDICINE

## 2019-08-12 PROCEDURE — 84443 ASSAY THYROID STIM HORMONE: CPT | Mod: ZL | Performed by: INTERNAL MEDICINE

## 2019-08-12 PROCEDURE — 82746 ASSAY OF FOLIC ACID SERUM: CPT | Mod: ZL | Performed by: INTERNAL MEDICINE

## 2019-08-12 PROCEDURE — 80053 COMPREHEN METABOLIC PANEL: CPT | Mod: ZL | Performed by: INTERNAL MEDICINE

## 2019-08-12 PROCEDURE — 82607 VITAMIN B-12: CPT | Mod: ZL | Performed by: INTERNAL MEDICINE

## 2019-08-12 PROCEDURE — 85027 COMPLETE CBC AUTOMATED: CPT | Mod: ZL | Performed by: INTERNAL MEDICINE

## 2019-08-12 PROCEDURE — 83540 ASSAY OF IRON: CPT | Mod: ZL | Performed by: INTERNAL MEDICINE

## 2019-08-12 PROCEDURE — 99215 OFFICE O/P EST HI 40 MIN: CPT | Performed by: INTERNAL MEDICINE

## 2019-08-12 PROCEDURE — G0463 HOSPITAL OUTPT CLINIC VISIT: HCPCS

## 2019-08-12 SDOH — HEALTH STABILITY: MENTAL HEALTH: HOW OFTEN DO YOU HAVE A DRINK CONTAINING ALCOHOL?: 4 OR MORE TIMES A WEEK

## 2019-08-12 SDOH — HEALTH STABILITY: MENTAL HEALTH: HOW MANY STANDARD DRINKS CONTAINING ALCOHOL DO YOU HAVE ON A TYPICAL DAY?: 1 OR 2

## 2019-08-12 ASSESSMENT — ENCOUNTER SYMPTOMS
SLEEP DISTURBANCE: 0
NECK PAIN: 0
HALLUCINATIONS: 0
SINUS PRESSURE: 0
SHORTNESS OF BREATH: 1
DIAPHORESIS: 0
COUGH: 0
BLOOD IN STOOL: 0
EYE REDNESS: 0
FREQUENCY: 0
JOINT SWELLING: 0
NECK STIFFNESS: 0
CONFUSION: 1
ABDOMINAL PAIN: 1
RHINORRHEA: 0
BACK PAIN: 1
DIARRHEA: 0
BRUISES/BLEEDS EASILY: 0
SEIZURES: 0
SORE THROAT: 0
ABDOMINAL DISTENTION: 0
NUMBNESS: 0
FEVER: 0
NAUSEA: 0
EYE PAIN: 0
TROUBLE SWALLOWING: 0
FACIAL SWELLING: 0
WEAKNESS: 0
FLANK PAIN: 0
VOMITING: 0
WHEEZING: 0
HEADACHES: 0
DIZZINESS: 0
DIFFICULTY URINATING: 0
FATIGUE: 1
TREMORS: 0
ADENOPATHY: 0
PALPITATIONS: 0
DYSURIA: 0
CHEST TIGHTNESS: 0
CHILLS: 0
HEMATURIA: 0
CONSTIPATION: 0
AGITATION: 0
SINUS PAIN: 0

## 2019-08-12 ASSESSMENT — ANXIETY QUESTIONNAIRES
7. FEELING AFRAID AS IF SOMETHING AWFUL MIGHT HAPPEN: NOT AT ALL
6. BECOMING EASILY ANNOYED OR IRRITABLE: NOT AT ALL
IF YOU CHECKED OFF ANY PROBLEMS ON THIS QUESTIONNAIRE, HOW DIFFICULT HAVE THESE PROBLEMS MADE IT FOR YOU TO DO YOUR WORK, TAKE CARE OF THINGS AT HOME, OR GET ALONG WITH OTHER PEOPLE: NOT DIFFICULT AT ALL
1. FEELING NERVOUS, ANXIOUS, OR ON EDGE: NOT AT ALL
2. NOT BEING ABLE TO STOP OR CONTROL WORRYING: NOT AT ALL
5. BEING SO RESTLESS THAT IT IS HARD TO SIT STILL: NOT AT ALL
3. WORRYING TOO MUCH ABOUT DIFFERENT THINGS: NOT AT ALL
GAD7 TOTAL SCORE: 0

## 2019-08-12 ASSESSMENT — PATIENT HEALTH QUESTIONNAIRE - PHQ9
5. POOR APPETITE OR OVEREATING: NOT AT ALL
SUM OF ALL RESPONSES TO PHQ QUESTIONS 1-9: 0

## 2019-08-12 ASSESSMENT — MIFFLIN-ST. JEOR: SCORE: 1108.34

## 2019-08-12 ASSESSMENT — PAIN SCALES - GENERAL: PAINLEVEL: SEVERE PAIN (6)

## 2019-08-12 NOTE — LETTER
August 12, 2019      Mervat Lockett  [address] [facility]  [place] [address]        Dear Mervat Lockett,    Below are the results of your recent labs:    Results for orders placed or performed in visit on 08/12/19   Iron Binding Panel   Result Value Ref Range    Iron 148 50 - 212 ug/dL    UIBC (Unsaturated) 244.00 mg/dL    Iron Binding Capacity 392.00 245.00 - 400.00 ug/dL    Iron Saturation 38 20 - 55 %   Folate   Result Value Ref Range    Folate >24.8 >5.21 ng/mL   Vitamin B12   Result Value Ref Range    Vitamin B12 352 180 - 914 pg/mL   TSH   Result Value Ref Range    Thyrotropin 3.20 0.34 - 5.60 IU/mL   CBC W PLT No Diff   Result Value Ref Range    WBC 6.3 4.0 - 11.0 10e9/L    RBC Count 4.66 3.8 - 5.2 10e12/L    Hemoglobin 14.4 11.7 - 15.7 g/dL    Hematocrit 45.4 35.0 - 47.0 %    MCV 97 78 - 100 fl    MCH 30.9 26.5 - 33.0 pg    MCHC 31.7 31.5 - 36.5 g/dL    RDW 13.4 10.0 - 15.0 %    Platelet Count 163 150 - 450 10e9/L   Comprehensive Metabolic Panel   Result Value Ref Range    Sodium 140 134 - 144 mmol/L    Potassium 4.2 3.5 - 5.1 mmol/L    Chloride 106 98 - 107 mmol/L    Carbon Dioxide 27 21 - 31 mmol/L    Anion Gap 7 3 - 14 mmol/L    Glucose 103 70 - 105 mg/dL    Urea Nitrogen 22 7 - 25 mg/dL    Creatinine 0.99 0.60 - 1.20 mg/dL    GFR Estimate 53 (L) >60 mL/min/[1.73_m2]    GFR Estimate If Black 64 >60 mL/min/[1.73_m2]    Calcium 9.8 8.6 - 10.3 mg/dL    Bilirubin Total 0.5 0.3 - 1.0 mg/dL    Albumin 4.0 3.5 - 5.7 g/dL    Protein Total 6.9 6.4 - 8.9 g/dL    Alkaline Phosphatase 61 34 - 104 U/L    ALT 15 7 - 52 U/L    AST 17 13 - 39 U/L        All of your blood tests are normal except for some very mild weakness of your kidneys.  This is a very good report.  Congratulations on your results.  We can discuss this further when you return for a follow-up visit.    Sincerely,        Ranulfo Ko MD  Internal Medicine  [facility]

## 2019-08-12 NOTE — PROGRESS NOTES
Chief Complaint:  This patient is here for a review of multiple issues.    HPI: She is brought in today by her  with multiple complaints.  She is been having a decline in her memory.  This is obviously a concern both for her  as well as for the patient.  She describes it as a short-term memory loss.    The patient has a recent past history of anemia.  This was felt to be both iron deficiency as well as B12 deficiency and there was thought to be possible folate deficiency as well.  She is taking iron but is not taking B12 or folate as she did not seem to tolerate those supplements.  Obviously if this is B12 deficiency, it could be causing some memory issues as well as some of her other complaints including balance issues etc.    She has a lot of trouble with back pain and hip pain as well as other arthritis.  She has a history of hip replacement and she has a history of some lumbar spinal stenosis.  She does not take anything for this at the present time.    She has a lot of GI problems which include upset stomach and heartburn.  She has had some intermittent diarrhea and vomiting.  She is on Nexium to daily which seems to help but oftentimes she needs to take an additional antiacid as well.  She has troubles with sleep.  She has troubles with shortness of breath especially with walking hills or stairs.  She has a lesion on her chest that she would like checked at some point.    Medications are reconciled.  Past medical history, past surgical history, family history and social histories are all reviewed and updated.  All records and results are reviewed.  She also has a history of a pancreatic mass, she is overdue for follow-up on that.    Past Medical History:   Diagnosis Date     ACP (advance care planning) 12/12/2013     Anemia, iron deficiency      Asymptomatic menopausal state     with mild symptoms     B12 deficiency 3/9/2012     Bilateral carpal tunnel syndrome 2/23/2018     Closed Colles'  fracture of right radius with routine healing 04/13/2017     Depression with anxiety 02/01/2018    Overview:  Chronic anxiety     Dysrhythmia     With hospitalization     Flushing     Occasional hot flashes     Hypercholesterolemia 2/1/2018     Hypertension 11/18/2010     Osteopenia      Spinal stenosis, lumbar 2/1/2018    Overview:  Degenerative lumbar disc disease with lumbar spinal stenosis       Past Surgical History:   Procedure Laterality Date     ARTHROPLASTY HIP      3/9/09,Left, by Ashish Ivory M.D.     BLEPHAROPLASTY  04/18/2016    by Dr White Left brow lift     CERVICAL POLYPECTOMY      Endocervical polyps     CYSTOCELE REPAIR      Uterine prolapse and vaginal vault prolapse with cystocele     EXTRACAPSULAR CATARACT EXTRATION WITH INTRAOCULAR LENS IMPLANT Bilateral 2010     HIP SURGERY Left 2012    Times 2     HIP SURGERY Right      HYSTERECTOMY TOTAL ABDOMINAL      Rectocele     OPEN REDUCTION INTERNAL FIXATION WRIST Right 04/21/2017    Right,Synthes stainless steel variable angle volar radial plate with T8 screw heads.     RELEASE CARPAL TUNNEL Right 4/25/2018    Procedure: RELEASE CARPAL TUNNEL;  Right Open Carpal Tunnel Release;  Surgeon: eGrald Martínez DO;  Location: [facility] OR     SALPINGO-OOPHORECTOMY BILATERAL      4/16/98,Bilateral salpingo oophorectomy and vaginal vault suspension       Current Outpatient Medications   Medication Sig Dispense Refill     esomeprazole (NEXIUM) 20 MG DR capsule Take 2 capsules (40 mg) by mouth every morning (before breakfast) Take 30-60 minutes before eating. 180 capsule 3     ferrous sulfate (FEROSUL) 325 (65 Fe) MG tablet Take 1 tablet (325 mg) by mouth daily (with breakfast) 90 tablet 3     LORazepam (ATIVAN) 0.5 MG tablet Take 1 tablet (0.5 mg) by mouth every 6 hours as needed for anxiety 20 tablet 0     metoprolol succinate (TOPROL-XL) 50 MG 24 hr tablet Take 0.5 tablets (25 mg) by mouth 2 times daily 90 tablet 3     mirtazapine (REMERON) 15 MG tablet Take  0.5 tablets (7.5 mg) by mouth At Bedtime 45 tablet 3     multivitamins w/minerals tablet Take 1 tablet by mouth daily   Has 12 mcg of vitamin b 12   Has 400 mcg of folic acid       venlafaxine (EFFEXOR) 37.5 MG tablet TAKE 1/2 TABLET BY MOUTH DAILY 45 tablet 3       Allergies   Allergen Reactions     Paroxetine Anxiety       Family History   Problem Relation Age of Onset     Other - See Comments Mother          of pneumonia     Heart Disease Father         CHF     Thyroid Disease Daughter         Thyroid Disease,Hypothyroid.     Blood Disease Daughter         Blood Disease,severe anemia with a hgb of less than 5     Family History Negative Son         Good Health     Family History Negative Daughter         Good Health     Thyroid Disease Daughter         Thyroid Disease,Hypothyroid.     Arthritis Other         Arthritis,Father's side has rheumatoid arthritis       Social History     Socioeconomic History     Marital status:      Spouse name: Not on file     Number of children: Not on file     Years of education: Not on file     Highest education level: Not on file   Occupational History     Not on file   Social Needs     Financial resource strain: Not on file     Food insecurity:     Worry: Not on file     Inability: Not on file     Transportation needs:     Medical: Not on file     Non-medical: Not on file   Tobacco Use     Smoking status: Former Smoker     Smokeless tobacco: Never Used   Substance and Sexual Activity     Alcohol use: Yes     Alcohol/week: 1.2 oz     Frequency: 4 or more times a week     Drinks per session: 1 or 2     Comment: Alcoholic Drinks/day: 1 a night     Drug use: No     Sexual activity: Not Currently     Partners: Male   Lifestyle     Physical activity:     Days per week: Not on file     Minutes per session: Not on file     Stress: Not on file   Relationships     Social connections:     Talks on phone: Not on file     Gets together: Not on file     Attends Hoahaoism service:  Not on file     Active member of club or organization: Not on file     Attends meetings of clubs or organizations: Not on file     Relationship status: Not on file     Intimate partner violence:     Fear of current or ex partner: Not on file     Emotionally abused: Not on file     Physically abused: Not on file     Forced sexual activity: Not on file   Other Topics Concern     Parent/sibling w/ CABG, MI or angioplasty before 65F 55M? Not Asked   Social History Narrative    She is  with four grown children.  She is retired.       No tobacco.  Alcohol - infrequently.       - Eddi - cell #[phone]    Patient's cell/cabin # [phone]    Daughter - Renetta Sher    Daughter - Johanna Saucedo    Son - Joni Lockett, lives in [place], has problems with alcoholism.    Dalittleer - Cat - lives in [place], [place]       Review of Systems   Constitutional: Positive for fatigue. Negative for chills, diaphoresis and fever.   HENT: Negative for congestion, ear pain, facial swelling, mouth sores, nosebleeds, rhinorrhea, sinus pressure, sinus pain, sore throat and trouble swallowing.    Eyes: Negative for pain, redness and visual disturbance.   Respiratory: Positive for shortness of breath. Negative for cough, chest tightness and wheezing.    Cardiovascular: Negative for chest pain, palpitations and leg swelling.   Gastrointestinal: Positive for abdominal pain. Negative for abdominal distention, blood in stool, constipation, diarrhea, nausea and vomiting.   Endocrine: Negative for cold intolerance and heat intolerance.   Genitourinary: Negative for difficulty urinating, dysuria, flank pain, frequency, hematuria, pelvic pain, vaginal bleeding, vaginal discharge and vaginal pain.   Musculoskeletal: Positive for back pain. Negative for gait problem, joint swelling, neck pain and neck stiffness.   Skin: Negative for rash.   Neurological: Negative for dizziness, tremors, seizures, syncope, weakness, numbness and headaches.    Hematological: Negative for adenopathy. Does not bruise/bleed easily.   Psychiatric/Behavioral: Positive for confusion. Negative for agitation, hallucinations and sleep disturbance.       Physical Exam   Constitutional: She appears well-developed and well-nourished. No distress.   HENT:   Head: Normocephalic.   Right Ear: External ear normal.   Left Ear: External ear normal.   Mouth/Throat: Oropharynx is clear and moist. No oropharyngeal exudate.   Eyes: Pupils are equal, round, and reactive to light. Conjunctivae are normal.   Neck: Normal range of motion. Neck supple. Normal carotid pulses and no JVD present. Carotid bruit is not present. No tracheal deviation present. No thyromegaly present.   Cardiovascular: Normal rate, regular rhythm and normal heart sounds. Exam reveals no gallop and no friction rub.   No murmur heard.  Pulmonary/Chest: Effort normal and breath sounds normal. No respiratory distress. She has no wheezes. She has no rales.   Abdominal: Soft. Bowel sounds are normal. She exhibits no distension and no mass. There is no tenderness. There is no rebound.   Musculoskeletal: Normal range of motion. She exhibits no edema.   Lymphadenopathy:     She has no cervical adenopathy.   Neurological: She is alert. She has normal reflexes. No cranial nerve deficit. Coordination normal.   Slight cognitive difficulties are noted   Skin: Skin is warm and dry. No rash noted. She is not diaphoretic.   Psychiatric: She has a normal mood and affect. Her behavior is normal.   Nursing note and vitals reviewed.      Assessment:      ICD-10-CM    1. B12 deficiency E53.8 CBC W PLT No Diff     Vitamin B12     Vitamin B12     CBC W PLT No Diff   2. Folic acid deficiency E53.8 Folate     Folate   3. Gastroesophageal reflux disease without esophagitis K21.9    4. Type 2 diabetes mellitus with complication, without long-term current use of insulin (H) E11.8 TSH     TSH   5. Essential hypertension I10    6. Abnormal CT of the  abdomen R93.5    7. Spinal stenosis of lumbar region with neurogenic claudication M48.062    8. Cognitive decline R41.89 Comprehensive Metabolic Panel     MR Brain w/o & w Contrast     Comprehensive Metabolic Panel   9. Pancreatic lesion K86.9 MR Abdomen w/o & w Contrast   10. Anemia, unspecified type D64.9 Iron Binding Panel     Iron Binding Panel        Plan: This patient has numerous problems at the present time.  This includes her cognitive decline.  We elected to evaluate this further with complete lab work including B12 level but also an MRI of the brain.  I will let her know the results.  For her pancreatic lesion, we will get her scheduled for a follow-up MRI and I will let them know the results.  No medications were adjusted today.  It is possible that she may need further back evaluation due to the pain that she is having there.  She may need further GI evaluation as well based on her symptoms.  I tend to think that she is developing some dementia just based on our visit today.    Her other issues including the skin lesion, sleep issues, fatigue and other complaints as outlined will be at least in part evaluated by some of the testing today, but I will have her back in 2 weeks for reassessment as there may be some further testing that needs to be done depending on her continued symptoms and depending on her findings.

## 2019-08-12 NOTE — NURSING NOTE
"The patient is here today to discuss her lower back pain along with a few other concerns.  Alyssa Grace LPN on 8/12/2019 at 10:57 AM  Chief Complaint   Patient presents with     Back Pain       Initial /86 (BP Location: Right arm, Patient Position: Sitting, Cuff Size: Adult Regular)   Pulse 76   Temp 97.4  F (36.3  C) (Tympanic)   Resp 18   Ht 1.588 m (5' 2.5\")   Wt 71.2 kg (157 lb)   Breastfeeding? No   BMI 28.26 kg/m   Estimated body mass index is 28.26 kg/m  as calculated from the following:    Height as of this encounter: 1.588 m (5' 2.5\").    Weight as of this encounter: 71.2 kg (157 lb).  Medication Reconciliation: incomplete    Alyssa Grace LPN    "

## 2019-08-13 ENCOUNTER — HOSPITAL ENCOUNTER (OUTPATIENT)
Dept: MRI IMAGING | Facility: OTHER | Age: 84
End: 2019-08-13
Attending: INTERNAL MEDICINE
Payer: MEDICARE

## 2019-08-13 ENCOUNTER — HOSPITAL ENCOUNTER (OUTPATIENT)
Dept: MRI IMAGING | Facility: OTHER | Age: 84
Discharge: HOME | End: 2019-08-13
Attending: INTERNAL MEDICINE | Admitting: INTERNAL MEDICINE
Payer: MEDICARE

## 2019-08-13 DIAGNOSIS — K86.9 PANCREATIC LESION: ICD-10-CM

## 2019-08-13 DIAGNOSIS — R41.89 COGNITIVE DECLINE: ICD-10-CM

## 2019-08-13 PROCEDURE — 25500064 ZZH RX 255 OP 636: Performed by: INTERNAL MEDICINE

## 2019-08-13 PROCEDURE — 70553 MRI BRAIN STEM W/O & W/DYE: CPT

## 2019-08-13 PROCEDURE — 74183 MRI ABD W/O CNTR FLWD CNTR: CPT

## 2019-08-13 PROCEDURE — A9577 INJ MULTIHANCE: HCPCS | Performed by: INTERNAL MEDICINE

## 2019-08-13 RX ADMIN — GADOBENATE DIMEGLUMINE 15 ML: 529 INJECTION, SOLUTION INTRAVENOUS at 11:59

## 2019-08-13 ASSESSMENT — ANXIETY QUESTIONNAIRES: GAD7 TOTAL SCORE: 0

## 2019-08-26 ENCOUNTER — OFFICE VISIT (OUTPATIENT)
Dept: INTERNAL MEDICINE | Facility: OTHER | Age: 84
End: 2019-08-26
Attending: INTERNAL MEDICINE
Payer: MEDICARE

## 2019-08-26 VITALS
BODY MASS INDEX: 28.65 KG/M2 | SYSTOLIC BLOOD PRESSURE: 138 MMHG | TEMPERATURE: 96.5 F | WEIGHT: 159.2 LBS | RESPIRATION RATE: 18 BRPM | DIASTOLIC BLOOD PRESSURE: 86 MMHG | HEART RATE: 84 BPM

## 2019-08-26 DIAGNOSIS — L82.0 INFLAMED SEBORRHEIC KERATOSIS: ICD-10-CM

## 2019-08-26 DIAGNOSIS — M48.062 SPINAL STENOSIS OF LUMBAR REGION WITH NEUROGENIC CLAUDICATION: ICD-10-CM

## 2019-08-26 DIAGNOSIS — R41.89 COGNITIVE DECLINE: ICD-10-CM

## 2019-08-26 DIAGNOSIS — R41.89 COGNITIVE DECLINE: Primary | ICD-10-CM

## 2019-08-26 DIAGNOSIS — K86.9 PANCREATIC LESION: ICD-10-CM

## 2019-08-26 PROCEDURE — G0463 HOSPITAL OUTPT CLINIC VISIT: HCPCS

## 2019-08-26 PROCEDURE — 17110 DESTRUCTION B9 LES UP TO 14: CPT | Performed by: INTERNAL MEDICINE

## 2019-08-26 PROCEDURE — 99214 OFFICE O/P EST MOD 30 MIN: CPT | Mod: 25 | Performed by: INTERNAL MEDICINE

## 2019-08-26 PROCEDURE — G0463 HOSPITAL OUTPT CLINIC VISIT: HCPCS | Mod: 25

## 2019-08-26 RX ORDER — DONEPEZIL HYDROCHLORIDE 10 MG/1
10 TABLET, FILM COATED ORAL AT BEDTIME
Qty: 90 TABLET | Refills: 3 | Status: SHIPPED | OUTPATIENT
Start: 2019-08-26 | End: 2019-11-26

## 2019-08-26 RX ORDER — MULTIVIT-MIN/IRON FUM/FOLIC AC 7.5 MG-4
TABLET ORAL
COMMUNITY
Start: 2019-08-26 | End: 2019-11-26

## 2019-08-26 RX ORDER — DONEPEZIL HYDROCHLORIDE 5 MG/1
5 TABLET, FILM COATED ORAL AT BEDTIME
Qty: 30 TABLET | Refills: 0 | Status: SHIPPED | OUTPATIENT
Start: 2019-08-26 | End: 2019-08-26

## 2019-08-26 ASSESSMENT — ENCOUNTER SYMPTOMS
ALLERGIC/IMMUNOLOGIC NEGATIVE: 1
ENDOCRINE NEGATIVE: 1
HEMATOLOGIC/LYMPHATIC NEGATIVE: 1
CONSTITUTIONAL NEGATIVE: 1

## 2019-08-26 ASSESSMENT — PATIENT HEALTH QUESTIONNAIRE - PHQ9
5. POOR APPETITE OR OVEREATING: NOT AT ALL
SUM OF ALL RESPONSES TO PHQ QUESTIONS 1-9: 0

## 2019-08-26 ASSESSMENT — ANXIETY QUESTIONNAIRES
3. WORRYING TOO MUCH ABOUT DIFFERENT THINGS: NOT AT ALL
1. FEELING NERVOUS, ANXIOUS, OR ON EDGE: NOT AT ALL
2. NOT BEING ABLE TO STOP OR CONTROL WORRYING: NOT AT ALL
6. BECOMING EASILY ANNOYED OR IRRITABLE: NOT AT ALL
IF YOU CHECKED OFF ANY PROBLEMS ON THIS QUESTIONNAIRE, HOW DIFFICULT HAVE THESE PROBLEMS MADE IT FOR YOU TO DO YOUR WORK, TAKE CARE OF THINGS AT HOME, OR GET ALONG WITH OTHER PEOPLE: NOT DIFFICULT AT ALL
7. FEELING AFRAID AS IF SOMETHING AWFUL MIGHT HAPPEN: NOT AT ALL
GAD7 TOTAL SCORE: 0
5. BEING SO RESTLESS THAT IT IS HARD TO SIT STILL: NOT AT ALL

## 2019-08-26 NOTE — PROGRESS NOTES
Chief Complaint:  F/U on testing.    HPI: She is in today for follow-up.  We did an evaluation for cognitive decline.  She has some atrophic changes on the MRI of her brain.  Her lab testing was unremarkable and normal.  She is frustrating that her memory has declined as is her .  I talked to them today about potential treatments that she could try.    The MRI of her pancreas showed no change.  This probably does not need to be evaluated again.  If at all, recheck in 1 year.    We also talked about her low back pain.  She does not really want to do anything for this.  I reviewed her MRI from 2018.   wants her to do some water therapy, etc.  She is not really interested in anything at this time.  She will just use over-the-counter analgesics as needed.    She laments the fact that she is aging.  She thinks that she is aged quite a bit in the last year.  She just does not have the enthusiasm to do things or be active like she used to.  She has a lesion on her chest wall that she would like checked.    Past Medical History:   Diagnosis Date     ACP (advance care planning) 12/12/2013     Anemia, iron deficiency      Asymptomatic menopausal state     with mild symptoms     B12 deficiency 3/9/2012     Bilateral carpal tunnel syndrome 2/23/2018     Closed Colles' fracture of right radius with routine healing 04/13/2017     Depression with anxiety 02/01/2018    Overview:  Chronic anxiety     Dysrhythmia     With hospitalization     Flushing     Occasional hot flashes     Hypercholesterolemia 2/1/2018     Hypertension 11/18/2010     Osteopenia      Spinal stenosis, lumbar 2/1/2018    Overview:  Degenerative lumbar disc disease with lumbar spinal stenosis       Past Surgical History:   Procedure Laterality Date     ARTHROPLASTY HIP      3/9/09,Left, by Ashish Ivory M.D.     BLEPHAROPLASTY  04/18/2016    by Dr White Left brow lift     CERVICAL POLYPECTOMY      Endocervical polyps     CYSTOCELE REPAIR       Uterine prolapse and vaginal vault prolapse with cystocele     EXTRACAPSULAR CATARACT EXTRATION WITH INTRAOCULAR LENS IMPLANT Bilateral 2010     HIP SURGERY Left 2012    Times 2     HIP SURGERY Right      HYSTERECTOMY TOTAL ABDOMINAL      Rectocele     OPEN REDUCTION INTERNAL FIXATION WRIST Right 04/21/2017    Right,Synthes stainless steel variable angle volar radial plate with T8 screw heads.     RELEASE CARPAL TUNNEL Right 4/25/2018    Procedure: RELEASE CARPAL TUNNEL;  Right Open Carpal Tunnel Release;  Surgeon: Gerald Martínez DO;  Location: [facility] OR     SALPINGO-OOPHORECTOMY BILATERAL      4/16/98,Bilateral salpingo oophorectomy and vaginal vault suspension       Allergies   Allergen Reactions     Paroxetine Anxiety       Current Outpatient Medications   Medication Sig Dispense Refill     donepezil (ARICEPT) 10 MG tablet Take 1 tablet (10 mg) by mouth At Bedtime 90 tablet 3     donepezil (ARICEPT) 5 MG tablet Take 1 tablet (5 mg) by mouth At Bedtime 30 tablet 0     esomeprazole (NEXIUM) 20 MG DR capsule Take 2 capsules (40 mg) by mouth every morning (before breakfast) Take 30-60 minutes before eating. 180 capsule 3     LORazepam (ATIVAN) 0.5 MG tablet Take 1 tablet (0.5 mg) by mouth every 6 hours as needed for anxiety 20 tablet 0     metoprolol succinate (TOPROL-XL) 50 MG 24 hr tablet Take 0.5 tablets (25 mg) by mouth 2 times daily 90 tablet 3     mirtazapine (REMERON) 15 MG tablet Take 0.5 tablets (7.5 mg) by mouth At Bedtime 45 tablet 3     multivitamins w/minerals tablet Take 1 tablet by mouth daily Has 12 mcg of vitamin b 12 Has 400 mcg of folic acid       venlafaxine (EFFEXOR) 37.5 MG tablet TAKE 1/2 TABLET BY MOUTH DAILY 45 tablet 3       Social History     Socioeconomic History     Marital status:      Spouse name: Not on file     Number of children: Not on file     Years of education: Not on file     Highest education level: Not on file   Occupational History     Not on file   Social Needs      Financial resource strain: Not on file     Food insecurity:     Worry: Not on file     Inability: Not on file     Transportation needs:     Medical: Not on file     Non-medical: Not on file   Tobacco Use     Smoking status: Former Smoker     Smokeless tobacco: Never Used   Substance and Sexual Activity     Alcohol use: Yes     Alcohol/week: 1.2 oz     Frequency: 4 or more times a week     Drinks per session: 1 or 2     Comment: Alcoholic Drinks/day: 1 a night     Drug use: No     Sexual activity: Not Currently     Partners: Male   Lifestyle     Physical activity:     Days per week: Not on file     Minutes per session: Not on file     Stress: Not on file   Relationships     Social connections:     Talks on phone: Not on file     Gets together: Not on file     Attends Restoration service: Not on file     Active member of club or organization: Not on file     Attends meetings of clubs or organizations: Not on file     Relationship status: Not on file     Intimate partner violence:     Fear of current or ex partner: Not on file     Emotionally abused: Not on file     Physically abused: Not on file     Forced sexual activity: Not on file   Other Topics Concern     Parent/sibling w/ CABG, MI or angioplasty before 65F 55M? Not Asked   Social History Narrative    She is  with four grown children.  She is retired.       No tobacco.  Alcohol - infrequently.       - Eddi - cell #[phone]    Patient's cell/cabin # [phone]    Daughter - Renetta Sher    Daughter - Johanna Saucedo    Son - Joni Lockett, lives in [place], has problems with alcoholism.    Urszula Shelton - lives in [place], [place]       Review of Systems   Constitutional: Negative.    Endocrine: Negative.    Skin: Negative.    Allergic/Immunologic: Negative.    Hematological: Negative.        Physical Exam   Constitutional: She is oriented to person, place, and time. She appears well-developed and well-nourished. No distress.   Neurological: She is alert  and oriented to person, place, and time.   Skin: Skin is warm and dry. She is not diaphoretic.   3 cm x 1 cm keratotic appearing lesion on her left anterior chest treated with liquid nitrogen.   Nursing note and vitals reviewed.      Assessment:      ICD-10-CM    1. Cognitive decline R41.89 donepezil (ARICEPT) 5 MG tablet     donepezil (ARICEPT) 10 MG tablet   2. Inflamed seborrheic keratosis L82.0 DESTRUCT PREMALIGNANT LESION, FIRST   3. Pancreatic lesion K86.9    4. Spinal stenosis of lumbar region with neurogenic claudication M48.062        Plan: In regards to her cognitive decline, she will continue taking her multivitamin with B complex in it.  She can stop the iron as she is no longer iron deficient.  I talked about treatment with Aricept and they are interested in trying this, she will take 5 mg for 30 days and then increase to 10 mg thereafter.  Reassured regarding all of her other issues, we can certainly be more aggressive with things such as her back pain, etc. if she so desires at a later date.  Follow-up on the skin lesion for retreatment if this does not improve.  I suggested a follow-up in 3 to 4 months to see how she is doing on the donepezil.

## 2019-08-26 NOTE — NURSING NOTE
"The patient is here today to be seen for a follow up visit.  Alyssa Grace LPN on 8/26/2019 at 10:54 AM  Chief Complaint   Patient presents with     RECHECK       Initial /86 (BP Location: Right arm, Patient Position: Sitting, Cuff Size: Adult Regular)   Pulse 84   Temp 96.5  F (35.8  C) (Tympanic)   Resp 18   Wt 72.2 kg (159 lb 3.2 oz)   Breastfeeding? No   BMI 28.65 kg/m   Estimated body mass index is 28.65 kg/m  as calculated from the following:    Height as of 8/12/19: 1.588 m (5' 2.5\").    Weight as of this encounter: 72.2 kg (159 lb 3.2 oz).  Medication Reconciliation: complete    Alyssa Grace LPN    "

## 2019-08-27 ASSESSMENT — ANXIETY QUESTIONNAIRES: GAD7 TOTAL SCORE: 0

## 2019-08-28 RX ORDER — DONEPEZIL HYDROCHLORIDE 5 MG/1
TABLET, FILM COATED ORAL
Qty: 90 TABLET | Refills: 2 | Status: SHIPPED | OUTPATIENT
Start: 2019-08-28 | End: 2019-11-26

## 2019-08-28 NOTE — TELEPHONE ENCOUNTER
"Requested Prescriptions   Pending Prescriptions Disp Refills     donepezil (ARICEPT) 5 MG tablet [Pharmacy Med Name: DONEPEZIL 5MG TABLETS] 90 tablet 0     Sig: TAKE 1 TABLET(5 MG) BY MOUTH AT BEDTIME       Miscellaneous Dementia Agents Passed - 8/26/2019 11:36 AM        Passed - Recent (12 mo) or future (30 days) visit within the authorizing provider's specialty     Patient had office visit in the last 12 months or has a visit in the next 30 days with authorizing provider or within the authorizing provider's specialty.  See \"Patient Info\" tab in inbasket, or \"Choose Columns\" in Meds & Orders section of the refill encounter.              Passed - Medication is active on med list        Passed - Patient is 18 years of age or older        LOV 08/26/19  Prescription approved per [facility] Refill Protocol.    "

## 2019-09-05 DIAGNOSIS — F41.9 ANXIETY: ICD-10-CM

## 2019-09-05 NOTE — TELEPHONE ENCOUNTER
Fax states 15 mg at bedtime, but prescription was changed at 12/14/19 visit with Mirna KATE.  Lucina Rossi, LPN, LPN 9/5/2019 9:17 AM

## 2019-09-06 RX ORDER — MIRTAZAPINE 15 MG/1
7.5 TABLET, FILM COATED ORAL AT BEDTIME
Qty: 45 TABLET | Refills: 3 | Status: SHIPPED | OUTPATIENT
Start: 2019-09-06 | End: 2020-12-03

## 2019-09-06 NOTE — TELEPHONE ENCOUNTER
"Requested Prescriptions   Pending Prescriptions Disp Refills     mirtazapine (REMERON) 15 MG tablet 45 tablet 3     Sig: Take 0.5 tablets (7.5 mg) by mouth At Bedtime       Atypical Antidepressants Protocol Passed - 9/5/2019  9:19 AM        Passed - Recent (12 mo) or future (30 days) visit within the authorizing provider's specialty     Patient had office visit in the last 12 months or has a visit in the next 30 days with authorizing provider or within the authorizing provider's specialty.  See \"Patient Info\" tab in inbasket, or \"Choose Columns\" in Meds & Orders section of the refill encounter.              Passed - Medication active on med list        Passed - Patient is age 18 or older        Passed - No active pregnancy on record        Passed - No positive pregnancy test in past 12 mos        LOV 8/26/19  Prescription approved per [facility] [place].  Brenda J. Goodell, RN on 9/6/2019 at 1:56 PM    "

## 2019-09-16 ENCOUNTER — TELEPHONE (OUTPATIENT)
Dept: INTERNAL MEDICINE | Facility: OTHER | Age: 84
End: 2019-09-16

## 2019-09-16 NOTE — TELEPHONE ENCOUNTER
"Patients  calling and states that patient took the Aricept 5 mg daily for about a week and had bad dreams and pain throughout her whole body.    States she has been off the Aricept now for about one week and dreams are gone and states\" being we are older we still have some aches and pains\".     wondering do they stay off of Aricept or could they try half of the 5 mg tablet and see how she does with that.   states that patient was not willing to push through with dreams.    Will route to  for review and suggestions.    Kira Rossi RN on 9/16/2019 at 11:53 AM        "

## 2019-09-16 NOTE — TELEPHONE ENCOUNTER
Contacted the patients EC and let him know the information below  Alyssa Grace LPN on 9/16/2019 at 12:36 PM

## 2019-09-23 DIAGNOSIS — R41.89 COGNITIVE DECLINE: ICD-10-CM

## 2019-09-25 RX ORDER — DONEPEZIL HYDROCHLORIDE 5 MG/1
TABLET, FILM COATED ORAL
Qty: 30 TABLET | Refills: 0 | OUTPATIENT
Start: 2019-09-25

## 2019-11-26 ENCOUNTER — OFFICE VISIT (OUTPATIENT)
Dept: INTERNAL MEDICINE | Facility: OTHER | Age: 84
End: 2019-11-26
Attending: INTERNAL MEDICINE
Payer: COMMERCIAL

## 2019-11-26 VITALS
HEART RATE: 80 BPM | HEIGHT: 63 IN | SYSTOLIC BLOOD PRESSURE: 136 MMHG | BODY MASS INDEX: 28.6 KG/M2 | TEMPERATURE: 96 F | RESPIRATION RATE: 16 BRPM | WEIGHT: 161.4 LBS | DIASTOLIC BLOOD PRESSURE: 84 MMHG

## 2019-11-26 DIAGNOSIS — E11.8 TYPE 2 DIABETES MELLITUS WITH COMPLICATION, WITHOUT LONG-TERM CURRENT USE OF INSULIN (H): ICD-10-CM

## 2019-11-26 DIAGNOSIS — M25.552 HIP PAIN, LEFT: ICD-10-CM

## 2019-11-26 DIAGNOSIS — I10 ESSENTIAL HYPERTENSION: ICD-10-CM

## 2019-11-26 DIAGNOSIS — F34.1 DYSTHYMIC DISORDER: ICD-10-CM

## 2019-11-26 DIAGNOSIS — D50.9 IRON DEFICIENCY ANEMIA, UNSPECIFIED IRON DEFICIENCY ANEMIA TYPE: ICD-10-CM

## 2019-11-26 DIAGNOSIS — K21.9 GASTROESOPHAGEAL REFLUX DISEASE WITHOUT ESOPHAGITIS: ICD-10-CM

## 2019-11-26 DIAGNOSIS — R41.89 COGNITIVE DECLINE: Primary | ICD-10-CM

## 2019-11-26 DIAGNOSIS — F41.9 ANXIETY: ICD-10-CM

## 2019-11-26 PROBLEM — M94.9 DISORDER OF BONE AND CARTILAGE: Status: RESOLVED | Noted: 2018-02-01 | Resolved: 2019-11-26

## 2019-11-26 PROBLEM — R93.5 ABNORMAL CT OF THE ABDOMEN: Status: RESOLVED | Noted: 2017-08-22 | Resolved: 2019-11-26

## 2019-11-26 PROBLEM — M89.9 DISORDER OF BONE AND CARTILAGE: Status: RESOLVED | Noted: 2018-02-01 | Resolved: 2019-11-26

## 2019-11-26 LAB
ERYTHROCYTE [DISTWIDTH] IN BLOOD BY AUTOMATED COUNT: 13.8 % (ref 10–15)
HBA1C MFR BLD: 6.3 % (ref 4–6)
HCT VFR BLD AUTO: 44.4 % (ref 35–47)
HGB BLD-MCNC: 14 G/DL (ref 11.7–15.7)
MCH RBC QN AUTO: 31 PG (ref 26.5–33)
MCHC RBC AUTO-ENTMCNC: 31.5 G/DL (ref 31.5–36.5)
MCV RBC AUTO: 98 FL (ref 78–100)
PLATELET # BLD AUTO: 175 10E9/L (ref 150–450)
RBC # BLD AUTO: 4.52 10E12/L (ref 3.8–5.2)
VIT B12 SERPL-MCNC: 303 PG/ML (ref 180–914)
WBC # BLD AUTO: 6.8 10E9/L (ref 4–11)

## 2019-11-26 PROCEDURE — 99215 OFFICE O/P EST HI 40 MIN: CPT | Performed by: INTERNAL MEDICINE

## 2019-11-26 PROCEDURE — 36415 COLL VENOUS BLD VENIPUNCTURE: CPT | Mod: ZL | Performed by: INTERNAL MEDICINE

## 2019-11-26 PROCEDURE — 83036 HEMOGLOBIN GLYCOSYLATED A1C: CPT | Mod: ZL | Performed by: INTERNAL MEDICINE

## 2019-11-26 PROCEDURE — G0463 HOSPITAL OUTPT CLINIC VISIT: HCPCS

## 2019-11-26 PROCEDURE — 85027 COMPLETE CBC AUTOMATED: CPT | Mod: ZL | Performed by: INTERNAL MEDICINE

## 2019-11-26 PROCEDURE — 82607 VITAMIN B-12: CPT | Mod: ZL | Performed by: INTERNAL MEDICINE

## 2019-11-26 RX ORDER — MULTIVIT-MIN/IRON FUM/FOLIC AC 7.5 MG-4
1 TABLET ORAL DAILY
COMMUNITY
Start: 2019-11-26

## 2019-11-26 RX ORDER — VENLAFAXINE 37.5 MG/1
TABLET ORAL
Qty: 45 TABLET | Refills: 3 | Status: SHIPPED | OUTPATIENT
Start: 2019-11-26 | End: 2020-11-30

## 2019-11-26 RX ORDER — MEMANTINE HYDROCHLORIDE 5 MG/1
TABLET ORAL
Qty: 42 TABLET | Refills: 0 | Status: SHIPPED | OUTPATIENT
Start: 2019-11-26 | End: 2019-12-12

## 2019-11-26 RX ORDER — METOPROLOL SUCCINATE 50 MG/1
25 TABLET, EXTENDED RELEASE ORAL
Qty: 90 TABLET | Refills: 3 | Status: SHIPPED | OUTPATIENT
Start: 2019-11-26 | End: 2020-11-30

## 2019-11-26 ASSESSMENT — ENCOUNTER SYMPTOMS
DIARRHEA: 0
FACIAL SWELLING: 0
HEADACHES: 0
NECK STIFFNESS: 0
NAUSEA: 0
ABDOMINAL DISTENTION: 0
BRUISES/BLEEDS EASILY: 0
VOMITING: 0
WEAKNESS: 0
SEIZURES: 0
DIAPHORESIS: 0
NECK PAIN: 0
TREMORS: 0
NERVOUS/ANXIOUS: 1
NUMBNESS: 0
SINUS PAIN: 0
ARTHRALGIAS: 1
HALLUCINATIONS: 0
RHINORRHEA: 0
AGITATION: 0
BACK PAIN: 0
DIFFICULTY URINATING: 0
CONSTIPATION: 0
PALPITATIONS: 0
CHEST TIGHTNESS: 0
SORE THROAT: 0
FLANK PAIN: 0
COUGH: 0
TROUBLE SWALLOWING: 0
ABDOMINAL PAIN: 0
WHEEZING: 0
JOINT SWELLING: 0
FREQUENCY: 0
DIZZINESS: 0
SLEEP DISTURBANCE: 0
DYSURIA: 0
FATIGUE: 0
HEMATURIA: 0
EYE REDNESS: 0
FEVER: 0
ADENOPATHY: 0
CONFUSION: 1
BLOOD IN STOOL: 0
CHILLS: 0
SHORTNESS OF BREATH: 0
EYE PAIN: 0
SINUS PRESSURE: 0

## 2019-11-26 ASSESSMENT — MIFFLIN-ST. JEOR: SCORE: 1128.3

## 2019-11-26 ASSESSMENT — ANXIETY QUESTIONNAIRES
IF YOU CHECKED OFF ANY PROBLEMS ON THIS QUESTIONNAIRE, HOW DIFFICULT HAVE THESE PROBLEMS MADE IT FOR YOU TO DO YOUR WORK, TAKE CARE OF THINGS AT HOME, OR GET ALONG WITH OTHER PEOPLE: SOMEWHAT DIFFICULT
1. FEELING NERVOUS, ANXIOUS, OR ON EDGE: SEVERAL DAYS
7. FEELING AFRAID AS IF SOMETHING AWFUL MIGHT HAPPEN: NOT AT ALL
6. BECOMING EASILY ANNOYED OR IRRITABLE: NEARLY EVERY DAY
GAD7 TOTAL SCORE: 8
5. BEING SO RESTLESS THAT IT IS HARD TO SIT STILL: NOT AT ALL
2. NOT BEING ABLE TO STOP OR CONTROL WORRYING: NOT AT ALL
3. WORRYING TOO MUCH ABOUT DIFFERENT THINGS: SEVERAL DAYS

## 2019-11-26 ASSESSMENT — PATIENT HEALTH QUESTIONNAIRE - PHQ9: 5. POOR APPETITE OR OVEREATING: NEARLY EVERY DAY

## 2019-11-26 NOTE — PROGRESS NOTES
Chief Complaint:  This patient is here for a comprehensive review of their multiple medical problems, renewal of medications and update on necessary health maintenance issues.      HPI: She is in today for complete evaluation.  She has a history of cognitive decline.  Full work-up has been unremarkable other than a previous history of B12 deficiency.  Her most recent B12 levels have been normal.  Nonetheless, she has continued cognitive decline concerns.  She was tried on Aricept but did not tolerate this.  I discussed with the patient and her  today about Namenda therapy as a trial.  They are interested in trying that.    The patient also has a history of some chronic anxiety.  She is on low-dose Effexor as well as Remeron at bedtime.  She appears to be stable with this.  She has a history of hypertension and is on low-dose metoprolol.  Her blood pressure is well controlled.  She has a history of some borderline diabetes, her last A1c was 6.2%.  She is due for recheck on that.    She has a history of reflux disease.  She takes omeprazole on a daily basis with good control.  She has a history of iron deficiency anemia, previous iron replacement therapy has resulted in normal hemoglobin levels.  She has a history of a pancreatic cyst which has remained stable and unremarkable on follow-up scans.    Her biggest problem is that of left hip pain.  She has pain right groin which is always worse when she makes a specific movements such as getting out of a chair or getting into a car.  She has had hip replacement on that side x2.  She is not interested in any further surgeries or anything terribly interventional.    Medications are reconciled.  Past medical history, past surgical history, family history and social histories are reviewed and updated.  Immunizations are up-to-date.        Past Medical History:   Diagnosis Date     ACP (advance care planning) 12/12/2013     Anemia, iron deficiency      Bilateral carpal  tunnel syndrome 2/23/2018     Closed Colles' fracture of right radius with routine healing 04/13/2017     Depression with anxiety 02/01/2018    Overview:  Chronic anxiety     Dysrhythmia     With hospitalization     Hypercholesterolemia 2/1/2018     Hypertension 11/18/2010     Osteopenia      Spinal stenosis, lumbar 2/1/2018    Overview:  Degenerative lumbar disc disease with lumbar spinal stenosis       Past Surgical History:   Procedure Laterality Date     ARTHROPLASTY HIP      3/9/09,Left, by Ashish Ivory M.D.     BLEPHAROPLASTY  04/18/2016    by Dr White Left brow lift     CERVICAL POLYPECTOMY      Endocervical polyps     CYSTOCELE REPAIR      Uterine prolapse and vaginal vault prolapse with cystocele     EXTRACAPSULAR CATARACT EXTRATION WITH INTRAOCULAR LENS IMPLANT Bilateral 2010     HIP SURGERY Left 2012    Times 2     HIP SURGERY Right      HYSTERECTOMY TOTAL ABDOMINAL      Rectocele     OPEN REDUCTION INTERNAL FIXATION WRIST Right 04/21/2017    Right,Synthes stainless steel variable angle volar radial plate with T8 screw heads.     RELEASE CARPAL TUNNEL Right 4/25/2018    Procedure: RELEASE CARPAL TUNNEL;  Right Open Carpal Tunnel Release;  Surgeon: Gerald Martínez DO;  Location: [facility] OR     SALPINGO-OOPHORECTOMY BILATERAL      4/16/98,Bilateral salpingo oophorectomy and vaginal vault suspension       Current Outpatient Medications   Medication Sig Dispense Refill     esomeprazole (NEXIUM) 20 MG DR capsule Take 2 capsules (40 mg) by mouth every morning (before breakfast) Take 30-60 minutes before eating. 180 capsule 3     memantine (NAMENDA) 5 MG tablet 1 daily for 1 week, then 1 BID for 1 week, then 2 in morning and 1 in evening for 1 week 42 tablet 0     metoprolol succinate ER (TOPROL-XL) 50 MG 24 hr tablet Take 0.5 tablets (25 mg) by mouth 2 times daily 90 tablet 3     mirtazapine (REMERON) 15 MG tablet Take 0.5 tablets (7.5 mg) by mouth At Bedtime 45 tablet 3     multivitamins w/minerals tablet Take  1 tablet by mouth daily       venlafaxine (EFFEXOR) 37.5 MG tablet TAKE 1/2 TABLET BY MOUTH DAILY 45 tablet 3       Allergies   Allergen Reactions     Aricept [Donepezil]      Paroxetine Anxiety       Family History   Problem Relation Age of Onset     Other - See Comments Mother          of pneumonia     Heart Disease Father         CHF     Thyroid Disease Daughter         Thyroid Disease,Hypothyroid.     Blood Disease Daughter         Blood Disease,severe anemia with a hgb of less than 5     Family History Negative Son         Good Health     Family History Negative Daughter         Good Health     Thyroid Disease Daughter         Thyroid Disease,Hypothyroid.     Arthritis Other         Arthritis,Father's side has rheumatoid arthritis       Social History     Socioeconomic History     Marital status:      Spouse name: Not on file     Number of children: Not on file     Years of education: Not on file     Highest education level: Not on file   Occupational History     Not on file   Social Needs     Financial resource strain: Not on file     Food insecurity:     Worry: Not on file     Inability: Not on file     Transportation needs:     Medical: Not on file     Non-medical: Not on file   Tobacco Use     Smoking status: Former Smoker     Smokeless tobacco: Never Used   Substance and Sexual Activity     Alcohol use: Yes     Alcohol/week: 2.0 standard drinks     Frequency: 4 or more times a week     Drinks per session: 1 or 2     Comment: Alcoholic Drinks/day: 1 a night     Drug use: No     Sexual activity: Not Currently     Partners: Male   Lifestyle     Physical activity:     Days per week: Not on file     Minutes per session: Not on file     Stress: Not on file   Relationships     Social connections:     Talks on phone: Not on file     Gets together: Not on file     Attends Rastafari service: Not on file     Active member of club or organization: Not on file     Attends meetings of clubs or organizations:  Not on file     Relationship status: Not on file     Intimate partner violence:     Fear of current or ex partner: Not on file     Emotionally abused: Not on file     Physically abused: Not on file     Forced sexual activity: Not on file   Other Topics Concern     Parent/sibling w/ CABG, MI or angioplasty before 65F 55M? Not Asked   Social History Narrative    She is  with four grown children.  She is retired.       No tobacco.  Alcohol - infrequently.       - Eddi - cell #[phone]    Patient's cell/cabin # [phone]    Daughter - Renetta Sher    Daughter - Johanna Saucedo    Son - Joni Lockett, lives in [place], has problems with alcoholism.    Daugher - Cat - lives in [place], [place]       Review of Systems   Constitutional: Negative for chills, diaphoresis, fatigue and fever.   HENT: Negative for congestion, ear pain, facial swelling, mouth sores, nosebleeds, rhinorrhea, sinus pressure, sinus pain, sore throat and trouble swallowing.    Eyes: Negative for pain, redness and visual disturbance.   Respiratory: Negative for cough, chest tightness, shortness of breath and wheezing.    Cardiovascular: Negative for chest pain, palpitations and leg swelling.   Gastrointestinal: Negative for abdominal distention, abdominal pain, blood in stool, constipation, diarrhea, nausea and vomiting.   Endocrine: Negative for cold intolerance and heat intolerance.   Genitourinary: Negative for difficulty urinating, dysuria, flank pain, frequency, hematuria, pelvic pain, vaginal bleeding, vaginal discharge and vaginal pain.   Musculoskeletal: Positive for arthralgias. Negative for back pain, gait problem, joint swelling, neck pain and neck stiffness.   Skin: Negative for rash.   Neurological: Negative for dizziness, tremors, seizures, syncope, weakness, numbness and headaches.   Hematological: Negative for adenopathy. Does not bruise/bleed easily.   Psychiatric/Behavioral: Positive for confusion. Negative for agitation,  hallucinations and sleep disturbance. The patient is nervous/anxious.        Physical Exam  Vitals signs and nursing note reviewed.   Constitutional:       General: She is not in acute distress.     Appearance: She is well-developed. She is not diaphoretic.   HENT:      Head: Normocephalic.      Right Ear: External ear normal.      Left Ear: External ear normal.      Mouth/Throat:      Pharynx: No oropharyngeal exudate.   Eyes:      Conjunctiva/sclera: Conjunctivae normal.      Pupils: Pupils are equal, round, and reactive to light.   Neck:      Musculoskeletal: Normal range of motion and neck supple.      Thyroid: No thyromegaly.      Vascular: Normal carotid pulses. No carotid bruit or JVD.      Trachea: No tracheal deviation.   Cardiovascular:      Rate and Rhythm: Normal rate and regular rhythm.      Heart sounds: Normal heart sounds. No murmur. No friction rub. No gallop.    Pulmonary:      Effort: Pulmonary effort is normal. No respiratory distress.      Breath sounds: Normal breath sounds. No wheezing or rales.   Abdominal:      General: Bowel sounds are normal. There is no distension.      Palpations: Abdomen is soft. There is no mass.      Tenderness: There is no abdominal tenderness. There is no rebound.   Musculoskeletal: Normal range of motion.      Comments: Subjective pain in the left groin and hip area.  There was pain with frog-leg maneuver as well but range of motion was fairly well-preserved.   Lymphadenopathy:      Cervical: No cervical adenopathy.   Skin:     General: Skin is warm and dry.      Findings: No rash.   Neurological:      Mental Status: She is alert.      Cranial Nerves: No cranial nerve deficit.      Coordination: Coordination normal.      Deep Tendon Reflexes: Reflexes are normal and symmetric.      Comments: Mild cognitive decline   Psychiatric:         Behavior: Behavior normal.         Assessment:      ICD-10-CM    1. Cognitive decline R41.89 memantine (NAMENDA) 5 MG tablet      Vitamin B12     Vitamin B12   2. Dysthymic disorder F34.1    3. Gastroesophageal reflux disease without esophagitis K21.9 esomeprazole (NEXIUM) 20 MG DR capsule   4. Essential hypertension I10 metoprolol succinate ER (TOPROL-XL) 50 MG 24 hr tablet   5. Iron deficiency anemia, unspecified iron deficiency anemia type D50.9 CBC W PLT No Diff     CBC W PLT No Diff   6. Type 2 diabetes mellitus with complication, without long-term current use of insulin (H) E11.8 Hemoglobin A1c     Hemoglobin A1c   7. Hip pain, left M25.552 PHYSICAL THERAPY REFERRAL   8. Anxiety F41.9 venlafaxine (EFFEXOR) 37.5 MG tablet        Plan: For her cognitive decline, she did not tolerate the Aricept.  We will try her on Namenda.  Warned of side effects.  If she tolerates this they will call to get the continuing dose of 10 mg twice daily.  Other medications were refilled as needed.  Lab is drawn and pending, I will send a letter with the results.  Everything else appears to be up-to-date.  Trial of physical therapy for the hip pain.  She really is not a surgical candidate and is not interested in surgery anyway.  Hopefully therapy can provide some improvement in her discomfort.

## 2019-11-26 NOTE — LETTER
November 26, 2019      Mervat Lockett  [address]  [facility] [address]        Dear Jose Martin,    Below are the results of your recent labs:    Results for orders placed or performed in visit on 11/26/19   Vitamin B12     Status: None   Result Value Ref Range    Vitamin B12 303 180 - 914 pg/mL   CBC W PLT No Diff     Status: None   Result Value Ref Range    WBC 6.8 4.0 - 11.0 10e9/L    RBC Count 4.52 3.8 - 5.2 10e12/L    Hemoglobin 14.0 11.7 - 15.7 g/dL    Hematocrit 44.4 35.0 - 47.0 %    MCV 98 78 - 100 fl    MCH 31.0 26.5 - 33.0 pg    MCHC 31.5 31.5 - 36.5 g/dL    RDW 13.8 10.0 - 15.0 %    Platelet Count 175 150 - 450 10e9/L   Hemoglobin A1c     Status: Abnormal   Result Value Ref Range    Hemoglobin A1C 6.3 (H) 4.0 - 6.0 %        Your blood tests look fine.  Your diabetes test is minimally elevated but still acceptable.  Assuming all goes well, I would recommend that we recheck this again in 6 months.    Sincerely,        Ranulfo Ko MD  Internal Medicine  [facility]

## 2019-11-27 ASSESSMENT — ANXIETY QUESTIONNAIRES: GAD7 TOTAL SCORE: 8

## 2019-12-12 DIAGNOSIS — R41.89 COGNITIVE DECLINE: Primary | ICD-10-CM

## 2019-12-12 RX ORDER — MEMANTINE HYDROCHLORIDE 10 MG/1
10 TABLET ORAL 2 TIMES DAILY
Qty: 180 TABLET | Refills: 3 | Status: SHIPPED | OUTPATIENT
Start: 2019-12-12 | End: 2021-01-20

## 2019-12-15 DIAGNOSIS — R41.89 COGNITIVE DECLINE: ICD-10-CM

## 2019-12-18 RX ORDER — MEMANTINE HYDROCHLORIDE 5 MG/1
TABLET ORAL
Qty: 42 TABLET | Refills: 0 | OUTPATIENT
Start: 2019-12-18

## 2019-12-18 NOTE — TELEPHONE ENCOUNTER
Namenda refilled on 12/12/19 #180 x 3 reffills to [facility].    Kira Rossi RN on 12/18/2019 at 11:47 AM

## 2020-01-06 ENCOUNTER — HOSPITAL ENCOUNTER (OUTPATIENT)
Dept: PHYSICAL THERAPY | Facility: OTHER | Age: 85
Setting detail: THERAPIES SERIES
End: 2020-01-06
Attending: INTERNAL MEDICINE
Payer: MEDICARE

## 2020-01-06 DIAGNOSIS — M25.552 HIP PAIN, LEFT: ICD-10-CM

## 2020-01-06 PROCEDURE — 97161 PT EVAL LOW COMPLEX 20 MIN: CPT | Mod: GP

## 2020-01-06 PROCEDURE — 97110 THERAPEUTIC EXERCISES: CPT | Mod: GP

## 2020-01-08 NOTE — PROGRESS NOTES
[facility]          OUTPATIENT PHYSICAL THERAPY ORTHOPEDIC EVALUATION  PLAN OF TREATMENT FOR OUTPATIENT REHABILITATION  (COMPLETE FOR INITIAL CLAIMS ONLY)  Patient's Last Name, First Name, M.I.  YOB: 1932  Baylee Lockettlene  HODA    Provider s Name:  [facility]   Medical Record No.  [id number]   Start of Care Date:  01/06/20   Onset Date:   insidious    Type:     _X__PT   ___OT   ___SLP Medical Diagnosis:  L hip pain     PT Diagnosis:  impaired mobility and function of L hip   Visits from SOC:  1      _________________________________________________________________________________  Plan of Treatment/Functional Goals:  balance training, gait training, manual therapy, ROM, strengthening, stretching     Hot packs  Other modalities as indicated    Goals  Goal Identifier: HEP  Goal Description: Pt will demonstrate accuracy of home exercise program in 2 weeks.  Target Date: 01/20/20    Goal Identifier: Strength  Goal Description: Pt will demonstrate 4/5 strength in all hip motions in order to improve transfer abilities in 8 weeks.  Target Date: 03/02/20    Goal Identifier: Car transfers  Goal Description: Pt will be able to get in and out of her vehicle without increasing symptoms in 8 weeks.  Target Date: 03/02/20       Therapy Frequency:  2 times/Week  Predicted Duration of Therapy Intervention:  8 weeks    Hernando Oneil, PT                 I CERTIFY THE NEED FOR THESE SERVICES FURNISHED UNDER        THIS PLAN OF TREATMENT AND WHILE UNDER MY CARE     (Physician co-signature of this document indicates review and certification of the therapy plan).                       Certification Date From:  01/06/20   Certification Date To:  03/02/20    Referring Provider:  Dr. Ranulfo Ko    Initial Assessment        See Epic Evaluation Start of Care Date: 01/06/20

## 2020-01-08 NOTE — PROGRESS NOTES
01/06/20 1300   General Information   Type of Visit Initial OP Ortho PT Evaluation   Start of Care Date 01/06/20   Referring Physician Dr. Ko   Orders Evaluate and Treat   Date of Order 11/26/19   Certification Required? Yes   Medical Diagnosis L hip pain   Surgical/Medical history reviewed Yes   Precautions/Limitations no known precautions/limitations   Weight-Bearing Status - LUE weight-bearing as tolerated   Weight-Bearing Status - RUE weight-bearing as tolerated   Weight-Bearing Status - LLE weight-bearing as tolerated   Weight-Bearing Status - RLE weight-bearing as tolerated   General Information Comments Pt is an 88-year old female presenting with L hip pain. She has had this pain for a while that occured with insidious onset. She primarily has pain with activity such as getting into the care or transitioning from sit to stand. She states she has been a relatively active individual all her life while raising 4 kids. She has recently become more sedentary and often has little motivation to do any activity as she finds it easier to just sit. Pt has a difficulty identifying many recent changes/symptoms due to a recent decline in cognitive function that appears to affecting her short term memory.   Body Part(s)   Body Part(s) Hip   Presentation and Etiology   Impairments D. Decreased ROM;E. Decreased flexibility;G. Impaired balance;H. Impaired gait;F. Decreased strength and endurance;A. Pain   Functional Limitations perform activities of daily living;perform desired leisure / sports activities   Symptom Location Deep left hip pain.   How/Where did it occur From insidious onset   Chronicity Chronic   Pain rating (0-10 point scale) Best (/10);Worst (/10)   Best (/10) 0   Worst (/10) 5   Pain quality A. Sharp   Frequency of pain/symptoms C. With activity   Pain/symptoms are: Worse during the day   Pain/symptoms exacerbated by D. Carrying;C. Lifting   Pain/symptoms eased by A. Sitting;E. Changing positions  "  Progression of symptoms since onset: Worsened   Prior Level of Function   Prior Level of Function-Mobility independent per pt   Prior Level of Function-ADLs independent   Current Level of Function   Patient role/employment history C. Homemaker   Living environment House/townhome   Current equipment-Gait/Locomotion None   Current equipment-ADL None   Fall Risk Screen   Fall screen completed by PT   Have you fallen 2 or more times in the past year? No   Have you fallen and had an injury in the past year? No   Is patient a fall risk? No   Hip Objective Findings   Side (if bilateral, select both right and left) Left   Integumentary  unremarkable   Gait/Locomotion decreased kandace, antalgic gait, step through pattern   Balance/Proprioception (Single Leg Stance) fair with light hand hold assist   Hip ROM Comments Pt has fair ROM with either soft (tissue approximation) or empty end feels. Pt does appear to have    Pelvic Screen negative   Hip/Knee Strength Comments With formal muscle testing, pt has mild to moderate weakness in all motions at the hip.    Scour Test mildly painful   SYL Test pt notes \"she can feel that\"   Palpation tender gluteals   Accessory Motion/Joint Mobility LE traction felt good on left side for patient, noted relief.    Left Hip Flexion PROM 104   Left Hip Abduction PROM WNL   Left Hip ER PROM 25   Left Hip IR PROM 40   Left Hip Flexion Strength 3+/5   Left Hip Abduction Strength 4/5   Left Knee Flexion Strength 4+/5   Left Knee Extension Strength 5/5   Left Hamstring Flexibility negative   Left Piriformis Flexibility positive   Planned Therapy Interventions   Planned Therapy Interventions balance training;gait training;manual therapy;ROM;strengthening;stretching   Planned Modality Interventions   Planned Modality Interventions Hot packs   Planned Modality Interventions Comments Other modalities as indicated   Clinical Impression   Criteria for Skilled Therapeutic Interventions Met yes, " treatment indicated   PT Diagnosis impaired mobility and function of L hip   Influenced by the following impairments pain, weakness   Functional limitations due to impairments limited ambulation, getting in and out of vehicles, transfers   Clinical Presentation Stable/Uncomplicated   Clinical Decision Making (Complexity) Low complexity   Therapy Frequency 2 times/Week   Predicted Duration of Therapy Intervention (days/wks) 8 weeks   Risk & Benefits of therapy have been explained Yes   Patient, Family & other staff in agreement with plan of care Yes   ORTHO GOALS   PT Ortho Eval Goals 1;2;3   Ortho Goal 1   Goal Identifier HEP   Goal Description Pt will demonstrate accuracy of home exercise program in 2 weeks.   Target Date 01/20/20   Ortho Goal 2   Goal Identifier Strength   Goal Description Pt will demonstrate 4/5 strength in all hip motions in order to improve transfer abilities in 8 weeks.   Target Date 03/02/20   Ortho Goal 3   Goal Identifier Car transfers   Goal Description Pt will be able to get in and out of her vehicle without increasing symptoms in 8 weeks.   Target Date 03/02/20   Total Evaluation Time   PT Eval, Low Complexity Minutes (85827) 15   Therapy Certification   Certification date from 01/06/20   Certification date to 03/02/20   Medical Diagnosis L hip pain

## 2020-01-09 ENCOUNTER — HOSPITAL ENCOUNTER (OUTPATIENT)
Dept: PHYSICAL THERAPY | Facility: OTHER | Age: 85
Setting detail: THERAPIES SERIES
End: 2020-01-09
Attending: INTERNAL MEDICINE
Payer: MEDICARE

## 2020-01-09 PROCEDURE — 97110 THERAPEUTIC EXERCISES: CPT | Mod: GP

## 2020-01-09 PROCEDURE — 97140 MANUAL THERAPY 1/> REGIONS: CPT | Mod: GP

## 2020-01-13 ENCOUNTER — HOSPITAL ENCOUNTER (OUTPATIENT)
Dept: PHYSICAL THERAPY | Facility: OTHER | Age: 85
Setting detail: THERAPIES SERIES
End: 2020-01-13
Attending: INTERNAL MEDICINE
Payer: MEDICARE

## 2020-01-13 PROCEDURE — 97110 THERAPEUTIC EXERCISES: CPT | Mod: GP

## 2020-01-15 ENCOUNTER — HOSPITAL ENCOUNTER (OUTPATIENT)
Dept: PHYSICAL THERAPY | Facility: OTHER | Age: 85
Setting detail: THERAPIES SERIES
End: 2020-01-15
Attending: INTERNAL MEDICINE
Payer: MEDICARE

## 2020-01-15 PROCEDURE — 97110 THERAPEUTIC EXERCISES: CPT | Mod: GP

## 2020-01-15 PROCEDURE — 97140 MANUAL THERAPY 1/> REGIONS: CPT | Mod: GP

## 2020-01-26 DIAGNOSIS — D50.9 IRON DEFICIENCY ANEMIA, UNSPECIFIED IRON DEFICIENCY ANEMIA TYPE: ICD-10-CM

## 2020-01-28 RX ORDER — FERROUS SULFATE 325(65) MG
TABLET ORAL
Qty: 90 TABLET | Refills: 3 | OUTPATIENT
Start: 2020-01-28

## 2020-01-28 NOTE — TELEPHONE ENCOUNTER
"Routing refill request to provider for review/approval because:      LOV: 11/26/19  Per OV on 8/26/19  \" She can stop the iron as she is no longer iron deficient. \"    Kira Rossi RN on 1/28/2020 at 8:58 AM              "

## 2020-01-28 NOTE — PROGRESS NOTES
Outpatient Physical Therapy Discharge Note     Patient: Mervat Lockett  : 1932    Beginning/End Dates of Reporting Period:  2020 to 1/15/2020    Referring Provider: Dr. Ko    Therapy Diagnosis: hip pain, impaired mobility, deconditioned     Client Self Report: Pt is feeling more tired and sore today, not sure why. Pt was called to schedule more appointments. Pt declined stating she didn't feel like she needed therapy anymore.    Goals:  Goal Identifier HEP   Goal Description Pt will demonstrate accuracy of home exercise program in 2 weeks.   Target Date 20   Date Met      Progress:     Goal Identifier Strength   Goal Description Pt will demonstrate 4/5 strength in all hip motions in order to improve transfer abilities in 8 weeks.   Target Date 20   Date Met      Progress:     Goal Identifier Car transfers   Goal Description Pt will be able to get in and out of her vehicle without increasing symptoms in 8 weeks.   Target Date 20   Date Met      Progress:     Progress Toward Goals:   Not assessed this period. Pt declined to schedule further appointments so formal assessment was not completed.    Plan:  Discharge from therapy.    Discharge:    Reason for Discharge: Patient has failed to schedule further appointments.    Equipment Issued: none    Discharge Plan: Patient to continue home program.   Reason For Visit  MARIE PARRY is an established  patient here today for a chief complaint of sharp right neck pain started this morning at 0830. PANFILO Quintana RN.   She is unaccompanied.        History of Present Illness  patient states she has had the same problem several times in the past but the pain was more today when she woke.      Review of Systems    All other systems reviewed and negative.      Allergies  No Known Drug Allergies    Current Meds   1. Hydrocortisone 2.5 % External Cream; APPLY SPARINGLY TO AFFECTED AREA(S)   TWICE DAILY;   Therapy: [id number] to (Last Rx:[id number])  Requested for: 24Ady4452 Ordered   2. med DEPO-PROVERA 150MG BC; Depo Provera 150 mg IM; To Be Done: 86Dwv0704;   Status: HOLD FOR - Administration Ordered   3. 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)  History of Low grade squamous intraepithelial lesion on cytologic smear of cervix  (LGSIL) (R87.612)  Nexplanon in place (Z97.5)  Screening for STD (sexually transmitted disease) (Z11.3)    Past Medical History  History of Acute otitis media (H66.90)  History of Breakthrough bleeding on Nexplanon (N92.1,Z97.8)  History of Contraception management (Z30.9)  History of Encounter for gynecological examination without abnormal finding (Z01.419)  History of Encounter for routine gynecological examination (Z01.419)  History of breast lump (Z87.898)  History of contact dermatitis (Z87.2)  History of vaginal  discharge (Z87.42)  History of viral infection (Z86.19)  History of Low grade squamous intraepithelial lesion on cytologic smear of cervix  (LGSIL) (R87.612)  History of Need for HPV vaccination (Z23)  History of Need for immunization against influenza (Z23)  Need for immunization against influenza (Z23)  Need for immunization against influenza (Z23)  Need for prophylactic vaccination and inoculation against varicella (Z23)  History of Nexplanon insertion (Z30.017)  History of Otitis externa of right ear (H60.91)  History of Pap smear for cervical cancer screening (Z12.4)  History of Routine screening for STI (sexually transmitted infection) (Z11.3)  History of Throat pain (R07.0)  History of UTI (lower urinary tract infection) (N39.0)  History of UTI symptoms (R39.9)  Well adolescent visit (Z00.129)  History of Well adolescent visit (Z00.129)    Surgical History  Denied: History of Surgery    Family History  Mother   Denied: Family history of Breast cancer  Maternal Grandmother   Family history of type 2 diabetes mellitus (Z83.3)  Family history of Hyperlipidemia  Paternal Grandmother   Family history of Reported Family History Of Kidney Disease  Family History   Denied: Family history of Cancer  Denied: Family history of Endometrial cancer  Denied: FH: colon cancer  Denied: Family history of Ovarian cancer  Denied: Family history of Prostate CA    Social History  Brushing The Teeth  Brushing The Teeth At Night  Brushing The Teeth In The Morning  Child Is Cared For At Home  Death In The Family   · cousin  Denied: Family Estrangement  Denied: History of domestic violence  Never a smoker  Never smoker  Nexplanon in place (Z97.5)  Denied: History of No alcohol use  Denied: History of No drug use  Occasional alcohol use  Personal Protective Equipment Seatbelts  Seeing A Dentist  Seeing A Dentist - Last Seen   · 8-09  Seeing A Dentist Regularly  Sexually active  Denied: History of Smokes cigarettes  Denied: Sun  Protection    Vitals  Signs   Recorded: 02Nov2018 05:16PM   Height: 5 ft   Weight: 93 lb 11.12 oz  BMI Calculated: 18.3  BSA Calculated: 1.35  Systolic: 109  Diastolic: 58  Temperature: 99.1 F  Heart Rate: 108  Respiration: 18  O2 Saturation: 99  Pain Scale: 06  LMP: 17Oct2018    Physical Exam  Constitutional: alert, in no acute distress and current vital signs reviewed.   Head and Face: atraumatic, no deformities, normocephalic, normal facies.   Eyes: no discharge, normal conjunctiva, no eyelid swelling, no ptosis and the sclerae were normal. pupils equal, round and reactive to light and accommodation, conjugate gaze and extraocular movements were intact.   ENT: normal appearing outer ear, normal appearing nose. examination of the tympanic membrane showed normal landmarks, normal appearing external canal. nasal mucosa moist and pink, no nasal discharge. normal lips. oral mucosa pink and moist, no oral lesions.   Neck: no mass was seen . patient has no swelling over the neck there is tenderness of the paracervical muscles on right with pain with flexion and rotation to the right.   Pulmonary: no respiratory distress, normal respiratory rate and effort and no accessory muscle use. breath sounds clear to auscultation bilaterally.   Cardiovascular: normal rate, regular rhythm, normal S1 and normal S2.   Abdomen: soft, nontender, nondistended and normal bowel sounds.   Musculoskeletal: normal gait.   Neurologic: cranial nerves grossly intact.   Psychiatric: oriented to person, oriented to place and oriented to time.   Skin, Hair, Nails: no rash.      Results/Data    02Nov2018 05:42PM   [facility] PREGNANCY TEST- URINE, IN-OFFICE   MONOCLONAL PREGNANCY: Presumptive Negative     Immunizations  DTP/DTaP --- Series1: 1995; Series2: 1995; Series3: 1995; Series4:  31-Aug-1996; Series5: 03-Apr-2000   H1N1 Influenza Inj --- Series1: 10-Nov-2009   Hepatitis A --- Series1: 25-Aug-2007; Series2: 17-Sep-2008    Hepatitis B --- Series1: 1995; Series2: 1995; Series3: 1995   HIB --- Series1: 1995; Series2: 1995; Series3: 1995   HPV --- Series1: 26-Jan-2008; Series2: 25-Jul-2008; Series3: 10-Oct-2008; Series4: 20-Oct-2015;  Series5: 20-Oct-2015   Influenza --- Series1: 24-Jan-2011; Series2: 28-Jan-2012; Series3: 31-Jan-2013; Series4:  28-Sep-2013; Series5: 13-Jan-2015; Series6: 22-Sep-2016; Series7: 25-Sep-2017   Meningococcal --- Series1: 17-Sep-2008; Series2: 17-Feb-2014   MMR --- Series1: 26-Apr-1996; Series2: 03-Apr-2000   Polio --- Series1: 1995; Series2: 1995; Series3: 31-Aug-1996; Series4: 30-Jun-2000   Tdap --- Series1: 17-Sep-2008   Varicella --- Series1: 02-May-1997; Series2: 24-Jan-2011     Assessment  Encounter for preventive health examination (Z00.00)   · Assessed By: MICHAELA MORRISSEY (Primary Care); Last Assessed: 02 Nov 2018  Acute neck pain (M54.2)   · Assessed By: MICHAELA MORRISSEY (Primary Care); Last Assessed: 02 Nov 2018    Plan  Methocarbamol 500 MG Oral Tablet (Robaxin); one tablet p.o prior to bed  Naproxen 500 MG Oral Tablet; TAKE 1 TABLET EVERY 12 HOURS AS NEEDED  Ketorolac Tromethamine 30 MG/ML Injection Solution  prc PREGNANCY TEST- URINE, IN-OFFICE; [Do Not Release]; Status:Resulted -  Requires Verification;   Done: 02Nov2018 05:42PM  XR SPINE CERVICAL 3V; Status:Complete;   Done: 02Nov2018  Plan IC:     call your doctor for follow up appointment.    To view an electronic version of your office visit summary, please access your [facility] Patient Portal account. If you are not currently signed up and you provided us with your email address, please locate the email from \"[facility]\" and follow instructions to activate your account. Or you can visit [email or web address] to submit an online portal request form.            Discussion/Summary    Clinical Staff: 1725- Pt discharged at this time, provided detailed printed summary of  today's visit to Estefani INMAN.  Discussed medication regimen prescribed today. Encouraged pt to follow up with PCP within 1 to 2 days to evaluate short term outcome. Verbalized a complete understanding of visit. Encouraged questions regarding visit. No questions at time of discharge. Pt independently ambulated from [facility] without incident. ANNE Gomez.      Signatures   Electronically signed by : Juan Quintana R.N.; Nov 2 2018  5:17PM CST    Electronically signed by : MICHAELA MORRISSEY MD; Nov 2 2018  6:16PM CST    Electronically signed by : MICHAELA MORRISSEY MD; Nov  3 2018 12:59PM CST

## 2020-03-03 ENCOUNTER — TELEPHONE (OUTPATIENT)
Dept: INTERNAL MEDICINE | Facility: OTHER | Age: 85
End: 2020-03-03

## 2020-03-03 DIAGNOSIS — K21.9 GASTROESOPHAGEAL REFLUX DISEASE WITHOUT ESOPHAGITIS: Primary | ICD-10-CM

## 2020-03-03 NOTE — TELEPHONE ENCOUNTER
Medication = esomeprazole MAG DR 20 mg cap    Fax from [employer] states that this drug has quantity limits.  They don't pay after patient has received the maximum 30 days.    Would you like to start a PA?  Glenis Kwan LPN on 3/3/2020 at 11:53 AM

## 2020-03-11 ENCOUNTER — HEALTH MAINTENANCE LETTER (OUTPATIENT)
Age: 85
End: 2020-03-11

## 2020-03-24 ENCOUNTER — TELEPHONE (OUTPATIENT)
Dept: INTERNAL MEDICINE | Facility: OTHER | Age: 85
End: 2020-03-24

## 2020-03-24 NOTE — TELEPHONE ENCOUNTER
MAF-Patients  calling about some questions about medication. Stated patient used to take Nexium and now they changed to a generic and cut the dosage in half and he wants some clarification on this. Please call patient and advise.  Ozzie Mcdonald on 3/24/2020 at 12:44 PM

## 2020-03-24 NOTE — TELEPHONE ENCOUNTER
Family is aware of the reasoning for the switch but wondering why patient was taking 40mg of esomeprazole and is now to take 20mg of omeprazole. Carley Conley LPN .............3/24/2020  2:15 PM

## 2020-03-24 NOTE — TELEPHONE ENCOUNTER
Son is wondering why when medication was changed to omeprazole the dosage changed to 20mg. Should she take 40mg? Patient has not started d/t having some esomeprazole left, she is still currently taking 40mg. Carley Conley LPN .............3/24/2020  2:00 PM

## 2020-07-27 ENCOUNTER — OFFICE VISIT (OUTPATIENT)
Dept: INTERNAL MEDICINE | Facility: OTHER | Age: 85
End: 2020-07-27
Attending: INTERNAL MEDICINE
Payer: MEDICARE

## 2020-07-27 ENCOUNTER — HOSPITAL ENCOUNTER (OUTPATIENT)
Dept: GENERAL RADIOLOGY | Facility: OTHER | Age: 85
End: 2020-07-27
Attending: INTERNAL MEDICINE
Payer: MEDICARE

## 2020-07-27 VITALS
TEMPERATURE: 97.4 F | DIASTOLIC BLOOD PRESSURE: 88 MMHG | OXYGEN SATURATION: 97 % | SYSTOLIC BLOOD PRESSURE: 134 MMHG | WEIGHT: 167 LBS | BODY MASS INDEX: 30.06 KG/M2 | HEART RATE: 91 BPM | RESPIRATION RATE: 16 BRPM

## 2020-07-27 DIAGNOSIS — R41.89 COGNITIVE DECLINE: ICD-10-CM

## 2020-07-27 DIAGNOSIS — M54.50 CHRONIC MIDLINE LOW BACK PAIN WITHOUT SCIATICA: Primary | ICD-10-CM

## 2020-07-27 DIAGNOSIS — M54.50 CHRONIC MIDLINE LOW BACK PAIN WITHOUT SCIATICA: ICD-10-CM

## 2020-07-27 DIAGNOSIS — G89.29 CHRONIC MIDLINE LOW BACK PAIN WITHOUT SCIATICA: Primary | ICD-10-CM

## 2020-07-27 DIAGNOSIS — E11.8 TYPE 2 DIABETES MELLITUS WITH COMPLICATION, WITHOUT LONG-TERM CURRENT USE OF INSULIN (H): ICD-10-CM

## 2020-07-27 DIAGNOSIS — M48.062 SPINAL STENOSIS OF LUMBAR REGION WITH NEUROGENIC CLAUDICATION: ICD-10-CM

## 2020-07-27 DIAGNOSIS — D50.9 IRON DEFICIENCY ANEMIA, UNSPECIFIED IRON DEFICIENCY ANEMIA TYPE: ICD-10-CM

## 2020-07-27 DIAGNOSIS — G89.29 CHRONIC MIDLINE LOW BACK PAIN WITHOUT SCIATICA: ICD-10-CM

## 2020-07-27 DIAGNOSIS — K21.9 GASTROESOPHAGEAL REFLUX DISEASE WITHOUT ESOPHAGITIS: ICD-10-CM

## 2020-07-27 LAB
ALBUMIN SERPL-MCNC: 4.1 G/DL (ref 3.5–5.7)
ALP SERPL-CCNC: 69 U/L (ref 34–104)
ALT SERPL W P-5'-P-CCNC: 15 U/L (ref 7–52)
ANION GAP SERPL CALCULATED.3IONS-SCNC: 8 MMOL/L (ref 3–14)
AST SERPL W P-5'-P-CCNC: 17 U/L (ref 13–39)
BILIRUB SERPL-MCNC: 0.4 MG/DL (ref 0.3–1)
BUN SERPL-MCNC: 24 MG/DL (ref 7–25)
CALCIUM SERPL-MCNC: 9.8 MG/DL (ref 8.6–10.3)
CHLORIDE SERPL-SCNC: 103 MMOL/L (ref 98–107)
CO2 SERPL-SCNC: 27 MMOL/L (ref 21–31)
CREAT SERPL-MCNC: 0.96 MG/DL (ref 0.6–1.2)
ERYTHROCYTE [DISTWIDTH] IN BLOOD BY AUTOMATED COUNT: 14.1 % (ref 10–15)
GFR SERPL CREATININE-BSD FRML MDRD: ABNORMAL ML/MIN/{1.73_M2}
GLUCOSE SERPL-MCNC: 121 MG/DL (ref 70–105)
HBA1C MFR BLD: 6.4 % (ref 4–6)
HCT VFR BLD AUTO: 44.1 % (ref 35–47)
HGB BLD-MCNC: 14.1 G/DL (ref 11.7–15.7)
MCH RBC QN AUTO: 30.7 PG (ref 26.5–33)
MCHC RBC AUTO-ENTMCNC: 32 G/DL (ref 31.5–36.5)
MCV RBC AUTO: 96 FL (ref 78–100)
PLATELET # BLD AUTO: 168 10E9/L (ref 150–450)
POTASSIUM SERPL-SCNC: 4.2 MMOL/L (ref 3.5–5.1)
PROT SERPL-MCNC: 7.1 G/DL (ref 6.4–8.9)
RBC # BLD AUTO: 4.6 10E12/L (ref 3.8–5.2)
SODIUM SERPL-SCNC: 138 MMOL/L (ref 134–144)
TSH SERPL DL<=0.05 MIU/L-ACNC: 4.29 IU/ML (ref 0.34–5.6)
WBC # BLD AUTO: 7.3 10E9/L (ref 4–11)

## 2020-07-27 PROCEDURE — 36415 COLL VENOUS BLD VENIPUNCTURE: CPT | Mod: ZL | Performed by: INTERNAL MEDICINE

## 2020-07-27 PROCEDURE — 85027 COMPLETE CBC AUTOMATED: CPT | Mod: ZL | Performed by: INTERNAL MEDICINE

## 2020-07-27 PROCEDURE — 99214 OFFICE O/P EST MOD 30 MIN: CPT | Performed by: INTERNAL MEDICINE

## 2020-07-27 PROCEDURE — G0463 HOSPITAL OUTPT CLINIC VISIT: HCPCS | Mod: 25

## 2020-07-27 PROCEDURE — 72100 X-RAY EXAM L-S SPINE 2/3 VWS: CPT

## 2020-07-27 PROCEDURE — G0463 HOSPITAL OUTPT CLINIC VISIT: HCPCS

## 2020-07-27 PROCEDURE — 80053 COMPREHEN METABOLIC PANEL: CPT | Mod: ZL | Performed by: INTERNAL MEDICINE

## 2020-07-27 PROCEDURE — 83036 HEMOGLOBIN GLYCOSYLATED A1C: CPT | Mod: ZL | Performed by: INTERNAL MEDICINE

## 2020-07-27 PROCEDURE — 84443 ASSAY THYROID STIM HORMONE: CPT | Mod: ZL | Performed by: INTERNAL MEDICINE

## 2020-07-27 ASSESSMENT — ENCOUNTER SYMPTOMS
HEMATOLOGIC/LYMPHATIC NEGATIVE: 1
ENDOCRINE NEGATIVE: 1
CONSTITUTIONAL NEGATIVE: 1
ALLERGIC/IMMUNOLOGIC NEGATIVE: 1

## 2020-07-27 ASSESSMENT — PAIN SCALES - GENERAL: PAINLEVEL: NO PAIN (0)

## 2020-07-27 NOTE — PROGRESS NOTES
Chief Complaint:  Check on issues.    HPI: She is in today with a few issues.  First of all she is having a lot of low back pain.  This does seem to be draining.  It radiates maybe a little bit to the posterior hip area but does not go down her legs.  She has not taken or tried anything other than a little bit of Tylenol.    She has a lot of fatigue and little energy.  She attributes this to her age as well as to her cognitive decline.  See previous notes regarding her cognitive decline.    She has had a couple of episodes of spontaneous emesis.  The reason for this is unclear.  After she has the emesis she feels fine.  It is very sudden and abrupt and then is gone just as fast as it came.    Her weight is actually up 10 pounds.  Medications remain the same.  Past medical history and past surgical history are reviewed and updated.  Some of these issues are reported on by her  today.    Past Medical History:   Diagnosis Date     ACP (advance care planning) 12/12/2013     Anemia, iron deficiency      Bilateral carpal tunnel syndrome 2/23/2018     Closed Colles' fracture of right radius with routine healing 04/13/2017     Cognitive decline      Depression with anxiety 02/01/2018    Overview:  Chronic anxiety     Dysrhythmia     With hospitalization     Hypercholesterolemia 2/1/2018     Hypertension 11/18/2010     Osteopenia      Spinal stenosis, lumbar 2/1/2018    Overview:  Degenerative lumbar disc disease with lumbar spinal stenosis       Past Surgical History:   Procedure Laterality Date     ARTHROPLASTY HIP      3/9/09,Left, by Ashish Ivory M.D.     BLEPHAROPLASTY  04/18/2016    by Dr White Left brow lift     CERVICAL POLYPECTOMY      Endocervical polyps     CYSTOCELE REPAIR      Uterine prolapse and vaginal vault prolapse with cystocele     EXTRACAPSULAR CATARACT EXTRATION WITH INTRAOCULAR LENS IMPLANT Bilateral 2010     HIP SURGERY Left 2012    Times 2     HIP SURGERY Right      HYSTERECTOMY TOTAL  ABDOMINAL      Rectocele     OPEN REDUCTION INTERNAL FIXATION WRIST Right 04/21/2017    Right,Synthes stainless steel variable angle volar radial plate with T8 screw heads.     RELEASE CARPAL TUNNEL Right 4/25/2018    Procedure: RELEASE CARPAL TUNNEL;  Right Open Carpal Tunnel Release;  Surgeon: Gerald Martínez DO;  Location: [facility] OR     SALPINGO-OOPHORECTOMY BILATERAL      4/16/98,Bilateral salpingo oophorectomy and vaginal vault suspension       Allergies   Allergen Reactions     Aricept [Donepezil]      Paroxetine Anxiety       Current Outpatient Medications   Medication Sig Dispense Refill     memantine (NAMENDA) 10 MG tablet Take 1 tablet (10 mg) by mouth 2 times daily 180 tablet 3     metoprolol succinate ER (TOPROL-XL) 50 MG 24 hr tablet Take 0.5 tablets (25 mg) by mouth 2 times daily 90 tablet 3     mirtazapine (REMERON) 15 MG tablet Take 0.5 tablets (7.5 mg) by mouth At Bedtime 45 tablet 3     multivitamins w/minerals tablet Take 1 tablet by mouth daily       omeprazole (PRILOSEC) 20 MG DR capsule Take 1 capsule (20 mg) by mouth daily 90 capsule 3     venlafaxine (EFFEXOR) 37.5 MG tablet TAKE 1/2 TABLET BY MOUTH DAILY 45 tablet 3       Social History     Socioeconomic History     Marital status:      Spouse name: Not on file     Number of children: Not on file     Years of education: Not on file     Highest education level: Not on file   Occupational History     Not on file   Social Needs     Financial resource strain: Not on file     Food insecurity     Worry: Not on file     Inability: Not on file     Transportation needs     Medical: Not on file     Non-medical: Not on file   Tobacco Use     Smoking status: Former Smoker     Smokeless tobacco: Never Used   Substance and Sexual Activity     Alcohol use: Yes     Alcohol/week: 2.0 standard drinks     Frequency: 4 or more times a week     Drinks per session: 1 or 2     Comment: Alcoholic Drinks/day: 1 a night     Drug use: No     Sexual activity: Not  Currently     Partners: Male   Lifestyle     Physical activity     Days per week: Not on file     Minutes per session: Not on file     Stress: Not on file   Relationships     Social connections     Talks on phone: Not on file     Gets together: Not on file     Attends [employer] service: Not on file     Active member of club or organization: Not on file     Attends meetings of clubs or organizations: Not on file     Relationship status: Not on file     Intimate partner violence     Fear of current or ex partner: Not on file     Emotionally abused: Not on file     Physically abused: Not on file     Forced sexual activity: Not on file   Other Topics Concern     Parent/sibling w/ CABG, MI or angioplasty before 65F 55M? Not Asked   Social History Narrative    She is  with four grown children.  She is retired.       No tobacco.  Alcohol - infrequently.       - Eddi - cell #[phone]    Patient's cell/cabin # [phone]    Daughter - Renetta Sher    Daughter - Johanna Saucedo    Son - Joni Lockett, lives in [place], has problems with alcoholism.    Urszula Shelton - lives in [place], [place]       Review of Systems   Constitutional: Negative.    Endocrine: Negative.    Skin: Negative.    Allergic/Immunologic: Negative.    Hematological: Negative.        Physical Exam  Vitals signs and nursing note reviewed.   Constitutional:       General: She is not in acute distress.     Appearance: Normal appearance. She is not ill-appearing, toxic-appearing or diaphoretic.   Neck:      Musculoskeletal: Normal range of motion and neck supple.   Cardiovascular:      Rate and Rhythm: Normal rate and regular rhythm.      Heart sounds: Normal heart sounds.   Pulmonary:      Effort: Pulmonary effort is normal.      Breath sounds: Normal breath sounds.   Abdominal:      General: Abdomen is flat. Bowel sounds are normal. There is no distension.      Palpations: Abdomen is soft.      Tenderness: There is no abdominal tenderness.    Musculoskeletal:      Right lower leg: No edema.      Left lower leg: No edema.   Skin:     General: Skin is warm and dry.   Neurological:      Mental Status: She is alert. Mental status is at baseline.         Assessment:      ICD-10-CM    1. Chronic midline low back pain without sciatica  M54.5 XR Lumbar Spine 2/3 Views    G89.29    2. Cognitive decline  R41.89    3. Type 2 diabetes mellitus with complication, without long-term current use of insulin (H)  E11.8 Hemoglobin A1c     TSH     Comprehensive metabolic panel     Comprehensive metabolic panel     TSH     Hemoglobin A1c   4. Gastroesophageal reflux disease without esophagitis  K21.9    5. Spinal stenosis of lumbar region with neurogenic claudication  M48.062    6. Iron deficiency anemia, unspecified iron deficiency anemia type  D50.9 CBC with platelets     CBC with platelets       Plan: X-ray of the lumbar spine today showed that she had significant arthritis.  This was reviewed with the patient.  I recommended Tylenol and heat as needed.  For her occasional vomiting as well as her fatigue and lack of energy, I did elect to do some lab today and I will send a letter with the results.  If she is having significant sleep problems she could try increasing the mirtazapine to a whole tablet at night.  Follow-up will be dependent on her progress.

## 2020-07-27 NOTE — LETTER
July 28, 2020      Mervat Lockett  [address] [facility] [address]        Dear Mervat,    Below are the results of your recent labs:    Results for orders placed or performed during the hospital encounter of 07/27/20   XR Lumbar Spine 2/3 Views     Status: None    Narrative    XR LUMBAR SPINE 2-3 VIEWS    HISTORY: Chronic midline low back pain without sciatica; Chronic  midline low back pain without sciatica .    TECHNIQUE: 3 views of lumbosacral spine.    COMPARISON: 7/18/2018.    FINDINGS:    There is moderate dextroscoliosis of the lumbar spine associated with  advanced asymmetric degenerative changes, worse when compared to 2018.  There are advanced bilateral SI joint degenerative changes. Bilateral  hip replacements are noted. The bones are osteopenic/osteoporotic.        Impression    IMPRESSION:     Diffuse advanced degenerative changes.      MEL SULLIVAN MD   Results for orders placed or performed in visit on 07/27/20   Comprehensive metabolic panel     Status: Abnormal   Result Value Ref Range    Sodium 138 134 - 144 mmol/L    Potassium 4.2 3.5 - 5.1 mmol/L    Chloride 103 98 - 107 mmol/L    Carbon Dioxide 27 21 - 31 mmol/L    Anion Gap 8 3 - 14 mmol/L    Glucose 121 (H) 70 - 105 mg/dL    Urea Nitrogen 24 7 - 25 mg/dL    Creatinine 0.96 0.60 - 1.20 mg/dL    GFR Estimate Not Calculated >60 mL/min/[1.73_m2]    GFR Estimate If Black Not Calculated >60 mL/min/[1.73_m2]    Calcium 9.8 8.6 - 10.3 mg/dL    Bilirubin Total 0.4 0.3 - 1.0 mg/dL    Albumin 4.1 3.5 - 5.7 g/dL    Protein Total 7.1 6.4 - 8.9 g/dL    Alkaline Phosphatase 69 34 - 104 U/L    ALT 15 7 - 52 U/L    AST 17 13 - 39 U/L   TSH     Status: None   Result Value Ref Range    Thyrotropin 4.29 0.34 - 5.60 IU/mL   CBC with platelets     Status: None   Result Value Ref Range    WBC 7.3 4.0 - 11.0 10e9/L    RBC Count 4.60 3.8 - 5.2 10e12/L    Hemoglobin 14.1 11.7 - 15.7 g/dL    Hematocrit 44.1 35.0 - 47.0 %    MCV 96 78 - 100 fl    MCH  30.7 26.5 - 33.0 pg    MCHC 32.0 31.5 - 36.5 g/dL    RDW 14.1 10.0 - 15.0 %    Platelet Count 168 150 - 450 10e9/L   Hemoglobin A1c     Status: Abnormal   Result Value Ref Range    Hemoglobin A1C 6.4 (H) 4.0 - 6.0 %        Your blood tests look fine.  Congratulations on this report.    Sincerely,        Ranulfo Ko MD  Internal Medicine  [facility]

## 2020-07-27 NOTE — NURSING NOTE
Mervat Lockett is a 88 year old female presenting today for: medication review  Medication Reconciliation: complete    Glenis Kwan LPN 7/27/2020 3:27 PM

## 2020-07-27 NOTE — PATIENT INSTRUCTIONS
Your back pain is from arthritis.  The x-ray today confirmed that and your previous MRI examinations of the back confirm this as well.  Suggest Tylenol as needed for pain.    The cause for the occasional vomiting is not clear.  You have had CT scans and MRI scans, etc. as recently as a year ago that looked fine.  It is reassuring that you have not lost weight.  We will see how your lab looks.  The blood tests will also be done to check for any causes for her fatigue and lack of energy, but that could be related to the fact that you are 88 years old.    If your sleep is particularly bothersome, you could try increasing the mirtazapine to a whole tablet at night to see if that is helpful.

## 2020-08-07 ENCOUNTER — TELEPHONE (OUTPATIENT)
Dept: INTERNAL MEDICINE | Facility: OTHER | Age: 85
End: 2020-08-07

## 2020-08-07 NOTE — TELEPHONE ENCOUNTER
Informed Angelo that the supervisor was notified of the address discrepancy.  Computer system, file, was checked immediately and the correct address is listed.  Tested printing out a new letter and the correct address appeared.  Angelo reminded to let us know if there are further problems.  Glenis Kwan LPN on 8/7/2020 at 1:23 PM

## 2020-08-07 NOTE — TELEPHONE ENCOUNTER
Angelo, who has rights to PHI, stated that the patient's PHI is being mailed to the wrong address.  It should be mailed to [address] [place] [id number].  Will talk to supervisors.  New letter sent to this corrected address.    Angelo had further questions.  Should the patient go back on iron?    Mirtazapine. Patient is not sleeping well some nights.  Wondering if Dr. Ko said to try a full pill once in awhile or more long term.  If the answer is long term, they will need a refill.  Angelo understands that Dr. Ko is out of office until Monday.  Glenis Kwan LPN on 8/7/2020 at 1:11 PM

## 2020-08-07 NOTE — TELEPHONE ENCOUNTER
Reason for call: Request for results.    Name of test or procedure: Lab Tests    Date of test or procedure: 07/27/2020    Location of test or procedure: [facility]    Preferred method for responding to this message: Telephone Call    Phone number patient can be reached at: Cell number on file:    Telephone Information:   Mobile [phone]   Mobile [phone]       If we can't reach you directly, may we leave a detailed response at the number you provided?Yes

## 2020-09-14 ENCOUNTER — OFFICE VISIT (OUTPATIENT)
Dept: INTERNAL MEDICINE | Facility: OTHER | Age: 85
End: 2020-09-14
Attending: INTERNAL MEDICINE
Payer: COMMERCIAL

## 2020-09-14 VITALS
BODY MASS INDEX: 29.52 KG/M2 | HEART RATE: 80 BPM | DIASTOLIC BLOOD PRESSURE: 80 MMHG | TEMPERATURE: 98 F | WEIGHT: 164 LBS | SYSTOLIC BLOOD PRESSURE: 132 MMHG | RESPIRATION RATE: 18 BRPM

## 2020-09-14 DIAGNOSIS — K44.9 HIATAL HERNIA: ICD-10-CM

## 2020-09-14 PROCEDURE — G0463 HOSPITAL OUTPT CLINIC VISIT: HCPCS

## 2020-09-14 PROCEDURE — 99213 OFFICE O/P EST LOW 20 MIN: CPT | Performed by: INTERNAL MEDICINE

## 2020-09-14 ASSESSMENT — ENCOUNTER SYMPTOMS
EYES NEGATIVE: 1
ENDOCRINE NEGATIVE: 1
CONSTITUTIONAL NEGATIVE: 1
ALLERGIC/IMMUNOLOGIC NEGATIVE: 1

## 2020-09-14 ASSESSMENT — PAIN SCALES - GENERAL: PAINLEVEL: NO PAIN (0)

## 2020-09-14 NOTE — PROGRESS NOTES
Chief Complaint:  Discuss hiatal hernia.    HPI: This patient has a hiatal hernia.  They are in today to talk about that.  This is been documented on CT scans in the past.  In 2017 it was described as small, in 2018 it was described as large.  This is concerning to them and they are wondering what if anything needs to be done at this point in time.  She does take omeprazole on a regular basis.    Past Medical History:   Diagnosis Date     ACP (advance care planning) 12/12/2013     Anemia, iron deficiency      Bilateral carpal tunnel syndrome 2/23/2018     Closed Colles' fracture of right radius with routine healing 04/13/2017     Cognitive decline      Depression with anxiety 02/01/2018    Overview:  Chronic anxiety     Dysrhythmia     With hospitalization     Hypercholesterolemia 2/1/2018     Hypertension 11/18/2010     Osteopenia      Spinal stenosis, lumbar 2/1/2018    Overview:  Degenerative lumbar disc disease with lumbar spinal stenosis       Past Surgical History:   Procedure Laterality Date     ARTHROPLASTY HIP      3/9/09,Left, by Ashish Ivory M.D.     BLEPHAROPLASTY  04/18/2016    by Dr White Left brow lift     CERVICAL POLYPECTOMY      Endocervical polyps     CYSTOCELE REPAIR      Uterine prolapse and vaginal vault prolapse with cystocele     EXTRACAPSULAR CATARACT EXTRATION WITH INTRAOCULAR LENS IMPLANT Bilateral 2010     HIP SURGERY Left 2012    Times 2     HIP SURGERY Right      HYSTERECTOMY TOTAL ABDOMINAL      Rectocele     OPEN REDUCTION INTERNAL FIXATION WRIST Right 04/21/2017    Right,Synthes stainless steel variable angle volar radial plate with T8 screw heads.     RELEASE CARPAL TUNNEL Right 4/25/2018    Procedure: RELEASE CARPAL TUNNEL;  Right Open Carpal Tunnel Release;  Surgeon: Gerald Martínez DO;  Location: [facility] OR     SALPINGO-OOPHORECTOMY BILATERAL      4/16/98,Bilateral salpingo oophorectomy and vaginal vault suspension       Allergies   Allergen Reactions     Aricept [Donepezil]       Paroxetine Anxiety       Current Outpatient Medications   Medication Sig Dispense Refill     memantine (NAMENDA) 10 MG tablet Take 1 tablet (10 mg) by mouth 2 times daily 180 tablet 3     metoprolol succinate ER (TOPROL-XL) 50 MG 24 hr tablet Take 0.5 tablets (25 mg) by mouth 2 times daily 90 tablet 3     mirtazapine (REMERON) 15 MG tablet Take 0.5 tablets (7.5 mg) by mouth At Bedtime 45 tablet 3     multivitamins w/minerals tablet Take 1 tablet by mouth daily       omeprazole (PRILOSEC) 20 MG DR capsule Take 1 capsule (20 mg) by mouth daily 90 capsule 3     venlafaxine (EFFEXOR) 37.5 MG tablet TAKE 1/2 TABLET BY MOUTH DAILY 45 tablet 3       Review of Systems   Constitutional: Negative.    Eyes: Negative.    Endocrine: Negative.    Allergic/Immunologic: Negative.        Physical Exam  Vitals signs and nursing note reviewed.   Constitutional:       General: She is not in acute distress.     Appearance: Normal appearance. She is not ill-appearing or diaphoretic.   Neurological:      Mental Status: She is alert.         Assessment:        ICD-10-CM    1. Hiatal hernia  K44.9        Plan: Reassurance and symptomatic measures, these were outlined in detail.  At this age and with her cognitive decline, she would not be a good surgical candidate.  Follow-up as needed.

## 2020-09-14 NOTE — NURSING NOTE
"Chief Complaint   Patient presents with     Hernia       Initial /80 (BP Location: Right arm, Patient Position: Sitting, Cuff Size: Adult Regular)   Pulse 80   Temp 98  F (36.7  C) (Tympanic)   Resp 18   Wt 74.4 kg (164 lb)   BMI 29.52 kg/m   Estimated body mass index is 29.52 kg/m  as calculated from the following:    Height as of 11/26/19: 1.588 m (5' 2.5\").    Weight as of this encounter: 74.4 kg (164 lb).  Medication Reconciliation: complete    Kalani Davenport LPN  "

## 2020-11-27 DIAGNOSIS — I10 ESSENTIAL HYPERTENSION: ICD-10-CM

## 2020-11-27 DIAGNOSIS — F41.9 ANXIETY: ICD-10-CM

## 2020-11-30 RX ORDER — METOPROLOL SUCCINATE 50 MG/1
25 TABLET, EXTENDED RELEASE ORAL
Qty: 90 TABLET | Refills: 1 | Status: SHIPPED | OUTPATIENT
Start: 2020-11-30 | End: 2021-07-26

## 2020-11-30 RX ORDER — VENLAFAXINE 37.5 MG/1
TABLET ORAL
Qty: 45 TABLET | Refills: 1 | Status: SHIPPED | OUTPATIENT
Start: 2020-11-30 | End: 2021-07-26

## 2020-11-30 NOTE — TELEPHONE ENCOUNTER
[facility] sent Rx request for the following:   venlafaxine (EFFEXOR) 37.5 MG tablet  Sig:TAKE 1/2 TABLET BY MOUTH DAILY    Last Prescription Date:   11/26/2019  Last Fill Qty/Refills:         45, R-3    Last Office Visit:              09/14/2020 (Maikel)   Future Office visit:           None noted   Serotonin-Norepinephrine Reuptake Inhibitors  Passed - 11/27/2020  3:21 PM   Warnings Override History for venlafaxine (EFFEXOR) 37.5 MG tablet [[id number]]    Overridden by Ranulfo Ko MD on Nov 26, 2019 2:37 PM   Drug-Drug   1. MIRTAZAPINE / SEROTONIN/NOREPINEPHRINE REUPTAKE INHIBITORS [Level: Major] [Reason: Tolerated medication/side effects in past]   Other Orders: mirtazapine (REMERON) 15 MG tablet          Prescription approved per [facility] Refill Protocol.  Katie Reno RN ....................  11/30/2020   10:42 AM

## 2020-11-30 NOTE — TELEPHONE ENCOUNTER
[facility] sent Rx request for the following:   metoprolol succinate ER (TOPROL-XL) 50 MG 24 hr tablet  Sig:Take 0.5 tablets (25 mg) by mouth 2 times daily    Last Prescription Date:   11/26/2019  Last Fill Qty/Refills:         90, R-3    Last Office Visit:              07/27/2020 (Maikel)   Future Office visit:           None noted   Beta-Blockers Protocol Passed - 11/27/2020  3:23 PM     Prescription approved per [facility].  Katie Reno RN ....................  11/30/2020   10:49 AM

## 2020-12-03 DIAGNOSIS — F41.9 ANXIETY: ICD-10-CM

## 2020-12-03 RX ORDER — MIRTAZAPINE 15 MG/1
7.5 TABLET, FILM COATED ORAL AT BEDTIME
Qty: 45 TABLET | Refills: 1 | Status: SHIPPED | OUTPATIENT
Start: 2020-12-03 | End: 2021-06-08

## 2020-12-03 NOTE — TELEPHONE ENCOUNTER
Routing refill request to provider for review/approval because:  Drug interaction warning        LOV: 7/27/2020    Kira Rossi RN on 12/3/2020 at 8:52 AM

## 2020-12-27 ENCOUNTER — HEALTH MAINTENANCE LETTER (OUTPATIENT)
Age: 85
End: 2020-12-27

## 2021-01-20 DIAGNOSIS — R41.89 COGNITIVE DECLINE: ICD-10-CM

## 2021-01-20 RX ORDER — MEMANTINE HYDROCHLORIDE 10 MG/1
TABLET ORAL
Qty: 180 TABLET | Refills: 1 | Status: SHIPPED | OUTPATIENT
Start: 2021-01-20 | End: 2021-07-26

## 2021-01-20 NOTE — TELEPHONE ENCOUNTER
[facility] sent Rx request for the following:   memantine (NAMENDA) 10 MG tablet  Sig: TAKE 1 TABLET(10 MG) BY MOUTH TWICE DAILY    Last Prescription Date:   12/12/2019  Last Fill Qty/Refills:         180, R-3    Last Office Visit:              09/14/2020 (Maikel)   Future Office visit:           None noted   Miscellaneous Dementia Agents Passed - 1/20/2021 11:32 AM     Prescription approved per [facility] Refill Protocol.  Katie Reno RN ....................  1/20/2021   11:40 AM

## 2021-02-10 DIAGNOSIS — K21.9 GASTROESOPHAGEAL REFLUX DISEASE WITHOUT ESOPHAGITIS: ICD-10-CM

## 2021-02-10 NOTE — TELEPHONE ENCOUNTER
"Requested Prescriptions   Pending Prescriptions Disp Refills     omeprazole (PRILOSEC) 20 MG DR capsule [Pharmacy Med Name: OMEPRAZOLE 20MG CAPSULES] 90 capsule 3     Sig: TAKE 1 CAPSULE(20 MG) BY MOUTH DAILY       PPI Protocol Passed - 2/10/2021  6:22 AM        Passed - Not on Clopidogrel (unless Pantoprazole ordered)        Passed - No diagnosis of osteoporosis on record        Passed - Recent (12 mo) or future (30 days) visit within the authorizing provider's specialty     Patient has had an office visit with the authorizing provider or a provider within the authorizing providers department within the previous 12 mos or has a future within next 30 days. See \"Patient Info\" tab in inbasket, or \"Choose Columns\" in Meds & Orders section of the refill encounter.      LOV 9/14/2020          Passed - Medication is active on med list        Passed - Patient is age 18 or older        Passed - No active pregnacy on record        Passed - No positive pregnancy test in past 12 months           Prescription approved per [facility] Refill Protocol. Zainab Crouch RN on 2/10/2021 at 8:08 AM      "

## 2021-02-11 ENCOUNTER — IMMUNIZATION (OUTPATIENT)
Dept: FAMILY MEDICINE | Facility: OTHER | Age: 86
End: 2021-02-11
Attending: FAMILY MEDICINE
Payer: MEDICARE

## 2021-02-11 PROCEDURE — 91300 PR COVID VAC PFIZER DIL RECON 30 MCG/0.3 ML IM: CPT

## 2021-02-22 ENCOUNTER — OFFICE VISIT (OUTPATIENT)
Dept: INTERNAL MEDICINE | Facility: OTHER | Age: 86
End: 2021-02-22
Attending: INTERNAL MEDICINE
Payer: COMMERCIAL

## 2021-02-22 VITALS
HEART RATE: 99 BPM | BODY MASS INDEX: 31.14 KG/M2 | WEIGHT: 169.2 LBS | HEIGHT: 62 IN | DIASTOLIC BLOOD PRESSURE: 78 MMHG | RESPIRATION RATE: 16 BRPM | OXYGEN SATURATION: 96 % | TEMPERATURE: 95.9 F | SYSTOLIC BLOOD PRESSURE: 128 MMHG

## 2021-02-22 DIAGNOSIS — R21 RASH AND NONSPECIFIC SKIN ERUPTION: Primary | ICD-10-CM

## 2021-02-22 PROCEDURE — 99213 OFFICE O/P EST LOW 20 MIN: CPT | Performed by: INTERNAL MEDICINE

## 2021-02-22 PROCEDURE — G0463 HOSPITAL OUTPT CLINIC VISIT: HCPCS

## 2021-02-22 RX ORDER — TRIAMCINOLONE ACETONIDE 1 MG/ML
LOTION TOPICAL 3 TIMES DAILY
Qty: 60 ML | Refills: 2 | Status: SHIPPED | OUTPATIENT
Start: 2021-02-22 | End: 2023-03-07

## 2021-02-22 ASSESSMENT — ENCOUNTER SYMPTOMS
CONSTITUTIONAL NEGATIVE: 1
EYES NEGATIVE: 1
ALLERGIC/IMMUNOLOGIC NEGATIVE: 1
ENDOCRINE NEGATIVE: 1

## 2021-02-22 ASSESSMENT — PAIN SCALES - GENERAL: PAINLEVEL: NO PAIN (0)

## 2021-02-22 ASSESSMENT — MIFFLIN-ST. JEOR: SCORE: 1137.8

## 2021-02-22 NOTE — PROGRESS NOTES
Chief Complaint:  Rash.    HPI: She comes in today complaining of a rash.  She tells me that its been there for approximately 5 days.  She has been scratching the area.  There is nothing apparently new or different with soaps, lotions, etc.  It is mainly over her anterior chest.  Her  did not accompany her today and she is a poor historian because of her dementia.    Past Medical History:   Diagnosis Date     ACP (advance care planning) 12/12/2013     Anemia, iron deficiency      Bilateral carpal tunnel syndrome 2/23/2018     Closed Colles' fracture of right radius with routine healing 04/13/2017     Cognitive decline      Depression with anxiety 02/01/2018    Overview:  Chronic anxiety     Dysrhythmia     With hospitalization     Hypercholesterolemia 2/1/2018     Hypertension 11/18/2010     Osteopenia      Spinal stenosis, lumbar 2/1/2018    Overview:  Degenerative lumbar disc disease with lumbar spinal stenosis       Past Surgical History:   Procedure Laterality Date     ARTHROPLASTY HIP      3/9/09,Left, by Ashish Ivory M.D.     BLEPHAROPLASTY  04/18/2016    by Dr White Left brow lift     CERVICAL POLYPECTOMY      Endocervical polyps     CYSTOCELE REPAIR      Uterine prolapse and vaginal vault prolapse with cystocele     EXTRACAPSULAR CATARACT EXTRATION WITH INTRAOCULAR LENS IMPLANT Bilateral 2010     HIP SURGERY Left 2012    Times 2     HIP SURGERY Right      HYSTERECTOMY TOTAL ABDOMINAL      Rectocele     OPEN REDUCTION INTERNAL FIXATION WRIST Right 04/21/2017    Right,Synthes stainless steel variable angle volar radial plate with T8 screw heads.     RELEASE CARPAL TUNNEL Right 4/25/2018    Procedure: RELEASE CARPAL TUNNEL;  Right Open Carpal Tunnel Release;  Surgeon: Gerald Martínez DO;  Location: [facility] OR     SALPINGO-OOPHORECTOMY BILATERAL      4/16/98,Bilateral salpingo oophorectomy and vaginal vault suspension       Allergies   Allergen Reactions     Aricept [Donepezil]      Paroxetine Anxiety        Current Outpatient Medications   Medication Sig Dispense Refill     memantine (NAMENDA) 10 MG tablet TAKE 1 TABLET(10 MG) BY MOUTH TWICE DAILY 180 tablet 1     metoprolol succinate ER (TOPROL-XL) 50 MG 24 hr tablet Take 0.5 tablets (25 mg) by mouth 2 times daily 90 tablet 1     mirtazapine (REMERON) 15 MG tablet Take 0.5 tablets (7.5 mg) by mouth At Bedtime 45 tablet 1     multivitamins w/minerals tablet Take 1 tablet by mouth daily       omeprazole (PRILOSEC) 20 MG DR capsule TAKE 1 CAPSULE(20 MG) BY MOUTH DAILY 90 capsule 1     triamcinolone (KENALOG) 0.1 % external lotion Apply topically 3 times daily 60 mL 2     venlafaxine (EFFEXOR) 37.5 MG tablet TAKE 1/2 TABLET BY MOUTH DAILY 45 tablet 1       Review of Systems   Unable to perform ROS: Dementia   Constitutional: Negative.    Eyes: Negative.    Endocrine: Negative.    Allergic/Immunologic: Negative.        Physical Exam  Vitals signs and nursing note reviewed.   Constitutional:       General: She is not in acute distress.     Appearance: Normal appearance. She is not ill-appearing, toxic-appearing or diaphoretic.   Skin:     Comments: She has a very nonspecific excoriated rash over her upper anterior chest.     Neurological:      Mental Status: She is alert.         Assessment:        ICD-10-CM    1. Rash and nonspecific skin eruption  R21 triamcinolone (KENALOG) 0.1 % external lotion       Plan: She has a nonspecific rash likely made worse by her excoriation.  I am not able to get any other history on this as she is not with her  at this visit.  I gave her a prescription for Kenalog lotion to use up to 3 times daily.  She has instructions to return if not improving.  I did provide straightforward instructions on her after visit summary, and recommended that she give this to her  for review.

## 2021-02-22 NOTE — PATIENT INSTRUCTIONS
Rash--not sure what is causing it.  Prescription for cortisone lotion sent to [facility]  Try that and try not to scratch the area so that it will heal.    Return if not improving.

## 2021-02-22 NOTE — NURSING NOTE
"Chief Complaint   Patient presents with     Derm Problem       Initial /78 (BP Location: Right arm, Patient Position: Sitting, Cuff Size: Adult Small)   Pulse 99   Temp 95.9  F (35.5  C) (Tympanic)   Resp 16   Ht 1.562 m (5' 1.5\")   Wt 76.7 kg (169 lb 3.2 oz)   SpO2 96%   BMI 31.45 kg/m   Estimated body mass index is 31.45 kg/m  as calculated from the following:    Height as of this encounter: 1.562 m (5' 1.5\").    Weight as of this encounter: 76.7 kg (169 lb 3.2 oz).  Medication Reconciliation: complete    Rosalba Jamil LPN    "

## 2021-03-04 ENCOUNTER — IMMUNIZATION (OUTPATIENT)
Dept: FAMILY MEDICINE | Facility: OTHER | Age: 86
End: 2021-03-04
Attending: FAMILY MEDICINE
Payer: MEDICARE

## 2021-03-04 PROCEDURE — 91300 PR COVID VAC PFIZER DIL RECON 30 MCG/0.3 ML IM: CPT

## 2021-03-06 ENCOUNTER — HEALTH MAINTENANCE LETTER (OUTPATIENT)
Age: 86
End: 2021-03-06

## 2021-04-25 ENCOUNTER — HEALTH MAINTENANCE LETTER (OUTPATIENT)
Age: 86
End: 2021-04-25

## 2021-05-25 NOTE — NURSING NOTE
"Previous A1C is at goal of <8  Lab Results   Component Value Date    A1C 6.5 11/28/2018     Urine microalbumin:creatine: n/a  Foot exam yearly  Eye exam yearly    Tobacco User no  Patient is not on a daily aspirin  Patient is not on a Statin.  Estimated body mass index is 29.02 kg/m  as calculated from the following:    Height as of this encounter: 1.6 m (5' 3\").    Weight as of this encounter: 74.3 kg (163 lb 12.8 oz).  Emilee English LPN on 1/25/2019 at 10:02 AM   "
flank

## 2021-06-05 DIAGNOSIS — F41.9 ANXIETY: ICD-10-CM

## 2021-06-08 RX ORDER — MIRTAZAPINE 15 MG/1
TABLET, FILM COATED ORAL
Qty: 45 TABLET | Refills: 2 | Status: SHIPPED | OUTPATIENT
Start: 2021-06-08 | End: 2022-05-10

## 2021-06-08 NOTE — TELEPHONE ENCOUNTER
"Requested Prescriptions   Pending Prescriptions Disp Refills     mirtazapine (REMERON) 15 MG tablet [Pharmacy Med Name: MIRTAZAPINE 15MG TABLETS] 45 tablet 1     Sig: TAKE 1/2 TABLET(7.5 MG) BY MOUTH AT BEDTIME       Atypical Antidepressants Protocol Passed - 6/5/2021  6:04 PM        Passed - Recent (12 mo) or future (30 days) visit within the authorizing provider's specialty     Patient has had an office visit with the authorizing provider or a provider within the authorizing providers department within the previous 12 mos or has a future within next 30 days. See \"Patient Info\" tab in inbasket, or \"Choose Columns\" in Meds & Orders section of the refill encounter.              Passed - Medication active on med list        Passed - Patient is age 18 or older        Passed - No active pregnancy on record        Passed - No positive pregnancy test in past 12 mos           LOV 2/22/21 Ko  Prescription approved per [facility] Refill Protocol.  Brenda J. Goodell, RN on 6/8/2021 at 9:46 AM    "

## 2021-10-06 ENCOUNTER — IMMUNIZATION (OUTPATIENT)
Dept: FAMILY MEDICINE | Facility: OTHER | Age: 86
End: 2021-10-06
Attending: FAMILY MEDICINE
Payer: MEDICARE

## 2021-10-06 DIAGNOSIS — Z23 NEED FOR PROPHYLACTIC VACCINATION AND INOCULATION AGAINST INFLUENZA: Primary | ICD-10-CM

## 2021-10-06 PROCEDURE — G0008 ADMIN INFLUENZA VIRUS VAC: HCPCS

## 2021-10-06 PROCEDURE — 0003A PR COVID VAC PFIZER DIL RECON 30 MCG/0.3 ML IM: CPT

## 2021-10-06 PROCEDURE — 90662 IIV NO PRSV INCREASED AG IM: CPT

## 2021-10-06 NOTE — ADDENDUM NOTE
Addended by: TREE ROTH on: 10/6/2021 11:44 AM     Modules accepted: Orders, Level of Service, SmartSet

## 2021-10-09 ENCOUNTER — HEALTH MAINTENANCE LETTER (OUTPATIENT)
Age: 86
End: 2021-10-09

## 2021-10-21 DIAGNOSIS — K21.9 GASTROESOPHAGEAL REFLUX DISEASE WITHOUT ESOPHAGITIS: ICD-10-CM

## 2021-10-21 DIAGNOSIS — R41.89 COGNITIVE DECLINE: ICD-10-CM

## 2021-10-21 DIAGNOSIS — I10 ESSENTIAL HYPERTENSION: ICD-10-CM

## 2021-10-21 DIAGNOSIS — F41.9 ANXIETY: ICD-10-CM

## 2021-10-21 RX ORDER — METOPROLOL SUCCINATE 50 MG/1
TABLET, EXTENDED RELEASE ORAL
Qty: 90 TABLET | Refills: 0 | Status: SHIPPED | OUTPATIENT
Start: 2021-10-21 | End: 2022-01-20

## 2021-10-21 RX ORDER — MEMANTINE HYDROCHLORIDE 10 MG/1
TABLET ORAL
Qty: 180 TABLET | Refills: 0 | Status: SHIPPED | OUTPATIENT
Start: 2021-10-21 | End: 2022-01-20

## 2021-10-21 RX ORDER — VENLAFAXINE 37.5 MG/1
TABLET ORAL
Qty: 45 TABLET | Refills: 0 | Status: SHIPPED | OUTPATIENT
Start: 2021-10-21 | End: 2022-01-20

## 2021-12-30 ENCOUNTER — OFFICE VISIT (OUTPATIENT)
Dept: FAMILY MEDICINE | Facility: OTHER | Age: 86
End: 2021-12-30
Attending: STUDENT IN AN ORGANIZED HEALTH CARE EDUCATION/TRAINING PROGRAM
Payer: MEDICARE

## 2021-12-30 VITALS
HEIGHT: 62 IN | RESPIRATION RATE: 16 BRPM | HEART RATE: 108 BPM | TEMPERATURE: 100.3 F | SYSTOLIC BLOOD PRESSURE: 139 MMHG | WEIGHT: 162.6 LBS | BODY MASS INDEX: 29.92 KG/M2 | DIASTOLIC BLOOD PRESSURE: 80 MMHG | OXYGEN SATURATION: 98 %

## 2021-12-30 DIAGNOSIS — R53.83 FATIGUE, UNSPECIFIED TYPE: ICD-10-CM

## 2021-12-30 DIAGNOSIS — K21.9 GASTROESOPHAGEAL REFLUX DISEASE WITHOUT ESOPHAGITIS: Primary | ICD-10-CM

## 2021-12-30 DIAGNOSIS — Z13.9 SCREENING FOR CONDITION: ICD-10-CM

## 2021-12-30 DIAGNOSIS — Z20.822 SUSPECTED 2019 NOVEL CORONAVIRUS INFECTION: ICD-10-CM

## 2021-12-30 DIAGNOSIS — R10.2 SUPRAPUBIC ABDOMINAL PAIN: ICD-10-CM

## 2021-12-30 LAB
BASOPHILS # BLD AUTO: 0 10E3/UL (ref 0–0.2)
BASOPHILS NFR BLD AUTO: 0 %
DEPRECATED CALCIDIOL+CALCIFEROL SERPL-MC: 25 UG/L (ref 30–100)
EOSINOPHIL # BLD AUTO: 0.2 10E3/UL (ref 0–0.7)
EOSINOPHIL NFR BLD AUTO: 2 %
ERYTHROCYTE [DISTWIDTH] IN BLOOD BY AUTOMATED COUNT: 14.2 % (ref 10–15)
FLUAV RNA SPEC QL NAA+PROBE: NEGATIVE
FLUBV RNA RESP QL NAA+PROBE: NEGATIVE
HCT VFR BLD AUTO: 41.7 % (ref 35–47)
HGB BLD-MCNC: 12.9 G/DL (ref 11.7–15.7)
IMM GRANULOCYTES # BLD: 0 10E3/UL
IMM GRANULOCYTES NFR BLD: 0 %
LYMPHOCYTES # BLD AUTO: 1.1 10E3/UL (ref 0.8–5.3)
LYMPHOCYTES NFR BLD AUTO: 11 %
MCH RBC QN AUTO: 28.9 PG (ref 26.5–33)
MCHC RBC AUTO-ENTMCNC: 30.9 G/DL (ref 31.5–36.5)
MCV RBC AUTO: 94 FL (ref 78–100)
MONOCYTES # BLD AUTO: 1 10E3/UL (ref 0–1.3)
MONOCYTES NFR BLD AUTO: 11 %
NEUTROPHILS # BLD AUTO: 7.1 10E3/UL (ref 1.6–8.3)
NEUTROPHILS NFR BLD AUTO: 76 %
NRBC # BLD AUTO: 0 10E3/UL
NRBC BLD AUTO-RTO: 0 /100
PLATELET # BLD AUTO: 131 10E3/UL (ref 150–450)
RBC # BLD AUTO: 4.46 10E6/UL (ref 3.8–5.2)
RSV RNA SPEC NAA+PROBE: NEGATIVE
SARS-COV-2 RNA RESP QL NAA+PROBE: POSITIVE
TSH SERPL DL<=0.005 MIU/L-ACNC: 2.32 MU/L (ref 0.4–4)
WBC # BLD AUTO: 9.5 10E3/UL (ref 4–11)

## 2021-12-30 PROCEDURE — 36415 COLL VENOUS BLD VENIPUNCTURE: CPT | Mod: ZL | Performed by: STUDENT IN AN ORGANIZED HEALTH CARE EDUCATION/TRAINING PROGRAM

## 2021-12-30 PROCEDURE — C9803 HOPD COVID-19 SPEC COLLECT: HCPCS | Performed by: STUDENT IN AN ORGANIZED HEALTH CARE EDUCATION/TRAINING PROGRAM

## 2021-12-30 PROCEDURE — 87637 SARSCOV2&INF A&B&RSV AMP PRB: CPT | Mod: ZL | Performed by: STUDENT IN AN ORGANIZED HEALTH CARE EDUCATION/TRAINING PROGRAM

## 2021-12-30 PROCEDURE — 85025 COMPLETE CBC W/AUTO DIFF WBC: CPT | Mod: ZL | Performed by: STUDENT IN AN ORGANIZED HEALTH CARE EDUCATION/TRAINING PROGRAM

## 2021-12-30 PROCEDURE — 84443 ASSAY THYROID STIM HORMONE: CPT | Mod: ZL | Performed by: STUDENT IN AN ORGANIZED HEALTH CARE EDUCATION/TRAINING PROGRAM

## 2021-12-30 PROCEDURE — G0463 HOSPITAL OUTPT CLINIC VISIT: HCPCS

## 2021-12-30 PROCEDURE — 99214 OFFICE O/P EST MOD 30 MIN: CPT | Performed by: STUDENT IN AN ORGANIZED HEALTH CARE EDUCATION/TRAINING PROGRAM

## 2021-12-30 PROCEDURE — 82306 VITAMIN D 25 HYDROXY: CPT | Mod: ZL | Performed by: STUDENT IN AN ORGANIZED HEALTH CARE EDUCATION/TRAINING PROGRAM

## 2021-12-30 RX ORDER — FAMOTIDINE 20 MG/1
20 TABLET, FILM COATED ORAL 2 TIMES DAILY PRN
Qty: 90 TABLET | Refills: 1 | Status: SHIPPED | OUTPATIENT
Start: 2021-12-30 | End: 2022-04-13

## 2021-12-30 ASSESSMENT — ANXIETY QUESTIONNAIRES
6. BECOMING EASILY ANNOYED OR IRRITABLE: SEVERAL DAYS
7. FEELING AFRAID AS IF SOMETHING AWFUL MIGHT HAPPEN: NOT AT ALL
GAD7 TOTAL SCORE: 3
7. FEELING AFRAID AS IF SOMETHING AWFUL MIGHT HAPPEN: NOT AT ALL
4. TROUBLE RELAXING: SEVERAL DAYS
3. WORRYING TOO MUCH ABOUT DIFFERENT THINGS: NOT AT ALL
2. NOT BEING ABLE TO STOP OR CONTROL WORRYING: SEVERAL DAYS
5. BEING SO RESTLESS THAT IT IS HARD TO SIT STILL: NOT AT ALL
GAD7 TOTAL SCORE: 3
GAD7 TOTAL SCORE: 3
1. FEELING NERVOUS, ANXIOUS, OR ON EDGE: NOT AT ALL

## 2021-12-30 ASSESSMENT — PATIENT HEALTH QUESTIONNAIRE - PHQ9
SUM OF ALL RESPONSES TO PHQ QUESTIONS 1-9: 4
10. IF YOU CHECKED OFF ANY PROBLEMS, HOW DIFFICULT HAVE THESE PROBLEMS MADE IT FOR YOU TO DO YOUR WORK, TAKE CARE OF THINGS AT HOME, OR GET ALONG WITH OTHER PEOPLE: NOT DIFFICULT AT ALL
SUM OF ALL RESPONSES TO PHQ QUESTIONS 1-9: 4

## 2021-12-30 ASSESSMENT — MIFFLIN-ST. JEOR: SCORE: 1107.86

## 2021-12-30 ASSESSMENT — PAIN SCALES - GENERAL: PAINLEVEL: MODERATE PAIN (5)

## 2021-12-30 NOTE — LETTER
My Depression Action Plan  Name: Mervat Lockett   Date of Birth 1/2/1932  Date: 12/30/2021    My doctor: Ranulfo Ko   My clinic: [facility]  [address]  [place] [id number] [phone]          GREEN    ZONE   Good Control    What it looks like:     Things are going generally well. You have normal ups and downs. You may even feel depressed from time to time, but bad moods usually last less than a day.   What you need to do:  1. Continue to care for yourself (see self care plan)  2. Check your depression survival kit and update it as needed  3. Follow your physician s recommendations including any medication.  4. Do not stop taking medication unless you consult with your physician first.           YELLOW         ZONE Getting Worse    What it looks like:     Depression is starting to interfere with your life.     It may be hard to get out of bed; you may be starting to isolate yourself from others.    Symptoms of depression are starting to last most all day and this has happened for several days.     You may have suicidal thoughts but they are not constant.   What you need to do:     1. Call your care team. Your response to treatment will improve if you keep your care team informed of your progress. Yellow periods are signs an adjustment may need to be made.     2. Continue your self-care.  Just get dressed and ready for the day.  Don't give yourself time to talk yourself out of it.    3. Talk to someone in your support network.    4. Open up your Depression Self-Care Plan/Wellness Kit.           RED    ZONE Medical Alert - Get Help    What it looks like:     Depression is seriously interfering with your life.     You may experience these or other symptoms: You can t get out of bed most days, can t work or engage in other necessary activities, you have trouble taking care of basic hygiene, or basic responsibilities, thoughts of suicide or death that will not go  away, self-injurious behavior.     What you need to do:  1. Call your care team and request a same-day appointment. If they are not available (weekends or after hours) call your local crisis line, emergency room or 911.          Depression Self-Care Plan / Wellness Kit    Many people find that medication and therapy are helpful treatments for managing depression. In addition, making small changes to your everyday life can help to boost your mood and improve your wellbeing. Below are some tips for you to consider. Be sure to talk with your medical provider and/or behavioral health consultant if your symptoms are worsening or not improving.     Sleep   Sleep hygiene  means all of the habits that support good, restful sleep. It includes maintaining a consistent bedtime and wake time, using your bedroom only for sleeping or sex, and keeping the bedroom dark and free of distractions like a computer, smartphone, or television.     Develop a Healthy Routine  Maintain good hygiene. Get out of bed in the morning, make your bed, brush your teeth, take a shower, and get dressed. Don t spend too much time viewing media that makes you feel stressed. Find time to relax each day.    Exercise  Get some form of exercise every day. This will help reduce pain and release endorphins, the  feel good  chemicals in your brain. It can be as simple as just going for a walk or doing some gardening, anything that will get you moving.      Diet  Strive to eat healthy foods, including fruits and vegetables. Drink plenty of water. Avoid excessive sugar, caffeine, alcohol, and other mood-altering substances.     Stay Connected with Others  Stay in touch with friends and family members.    Manage Your Mood  Try deep breathing, massage therapy, biofeedback, or meditation. Take part in fun activities when you can. Try to find something to smile about each day.     Psychotherapy  Be open to working with a therapist if your provider recommends it.      Medication  Be sure to take your medication as prescribed. Most anti-depressants need to be taken every day. It usually takes several weeks for medications to work. Not all medicines work for all people. It is important to follow-up with your provider to make sure you have a treatment plan that is working for you. Do not stop your medication abruptly without first discussing it with your provider.    Crisis Resources   These hotlines are for both adults and children. They and are open 24 hours a day, 7 days a week unless noted otherwise.      National Suicide Prevention Lifeline   5-505-580-TALK (3475)      Crisis Text Line    www.crisistextline.org  Text HOME to 569224 from anywhere in the [place], anytime, about any type of crisis. A live, trained crisis counselor will receive the text and respond quickly.      [place] Lifeline for LGBTQ Youth  A national crisis intervention and suicide lifeline for LGBTQ youth under 25. Provides a safe place to talk without judgement. Call 1-611.211.9701; text START to 194055 or visit www.thetrevorproject.org to talk to a trained counselor.      For [facility] crisis numbers, visit the [facility] website at:  https://[place].gov/[place]hs/people-we-serve/adults/health-care/mental-health/resources/crisis-contacts.jsp

## 2021-12-30 NOTE — PROGRESS NOTES
Assessment & Plan     Gastroesophageal reflux disease without esophagitis  Reassuring abdominal exam with some suprapubic tenderness. No distention, bowel sounds are present. I did review taking omeprazole in the morning at least 20 min before food (she said she can't do this). Will add pepcid. We did discuss further treatment for hiatal hernia would be more invasive and they are not interested in anything beyond conservative measurements now.   - famotidine (PEPCID) 20 MG tablet; Take 1 tablet (20 mg) by mouth 2 times daily as needed (acid reflux)    Suspected 2019 novel coronavirus infection  Tachycardia  Elevated temperature  Exposure to covid. Temp of 100.3 today. HR of 108--she said she is anxious. Could also have other viral illness. Reviewed supportive cares at home, warning symptoms of when to seek care  - Symptomatic; Yes; 12/28/2021 Influenza A/B & SARS-CoV2 (COVID-19) Virus PCR Multiplex Nose; Future  - Symptomatic; Yes; 12/28/2021 Influenza A/B & SARS-CoV2 (COVID-19) Virus PCR Multiplex Nose    Suprapubic abdominal pain  With the temp of 100.3 today and tachycardia, will check urine as she is a poor historian.   - UA reflex to Microscopic and Culture; Future    Fatigue, unspecified type  Broad differential for cause of fatigue including vitamin D deficiency vs thyroid disorder vs depression vs dementia vs anemia vs deconditioning vs medications (she is on remeron).  is hoping to check labs today to see if anything is wrong   - CBC and Differential; Future  - TSH Reflex GH; Future  - TSH Reflex GH  - CBC and Differential  - Vitamin D Total; Future  - Vitamin D Total        Shaun Tenorio MD  [facility]    Roseanna Philippe is a 89 year old who presents for the following health issues     HPI     Here today with her . Due to dementia history is mostly obtained from her   Has a known hiatal hernia. They are treating her GERD with omeprazole but she often  does not take this medication  Every 2 weeks she has heart burn/epigastric pain that causes her to have emesis. Has always been able to keep food down, having regular BMs. Here today to discuss treatment options to hiatal hernia.     Also having 2 days of dry cough and congested. Have had covid vaccine and booster but 6 days ago exposed to COVID at family gathering. Is tahcy to 108 today (she said she is anxious) and temp of 100.3    No dysuria. No chest pain. No diarrhea or emesis today. No open wounds or rash. Has dry skin on legs.     Feels anxious being here today.     thinks memory is baseline wax and waning.      He also notes years of fatigue and is worried she has anemia. No blood in stool, emesis or urine. He said it is an on going issue for her but he is worried              Review of Systems   Constitutional, HEENT, cardiovascular, pulmonary, gi and gu systems are negative, except as otherwise noted.      Objective    There were no vitals taken for this visit.  There is no height or weight on file to calculate BMI.  Physical Exam   GENERAL: healthy, alert and no distress  EYES: Eyes grossly normal to inspection, PERRL and conjunctivae and sclerae normal  HENT: ear canals and TM's normal, nose and mouth without ulcers or lesions  NECK: no adenopathy, no asymmetry, masses, or scars and thyroid normal to palpation  RESP: lungs clear to auscultation - no rales, rhonchi or wheezes  CV: regular rate and rhythm, normal S1 S2, no S3 or S4, no murmur, click or rub, no peripheral edema and peripheral pulses strong  ABDOMEN: soft, non distended, bowel sounds present in all 4 quadrants. Suprapubic tenderness to palpation. No epigastric tenderness to palpation   MS: no gross musculoskeletal defects noted, no edema  SKIN: dry skin on bilateral shins  NEURO: oriented to self and place  PSYCH: affect normal/bright    Results for orders placed or performed in visit on 12/30/21 (from the past 24 hour(s))   CBC and  Differential    Narrative    The following orders were created for panel order CBC and Differential.  Procedure                               Abnormality         Status                     ---------                               -----------         ------                     CBC with platelets and d...[[id number]]  Abnormal            Final result                 Please view results for these tests on the individual orders.   CBC with platelets and differential   Result Value Ref Range    WBC Count 9.5 4.0 - 11.0 10e3/uL    RBC Count 4.46 3.80 - 5.20 10e6/uL    Hemoglobin 12.9 11.7 - 15.7 g/dL    Hematocrit 41.7 35.0 - 47.0 %    MCV 94 78 - 100 fL    MCH 28.9 26.5 - 33.0 pg    MCHC 30.9 (L) 31.5 - 36.5 g/dL    RDW 14.2 10.0 - 15.0 %    Platelet Count 131 (L) 150 - 450 10e3/uL    % Neutrophils 76 %    % Lymphocytes 11 %    % Monocytes 11 %    % Eosinophils 2 %    % Basophils 0 %    % Immature Granulocytes 0 %    NRBCs per 100 WBC 0 <1 /100    Absolute Neutrophils 7.1 1.6 - 8.3 10e3/uL    Absolute Lymphocytes 1.1 0.8 - 5.3 10e3/uL    Absolute Monocytes 1.0 0.0 - 1.3 10e3/uL    Absolute Eosinophils 0.2 0.0 - 0.7 10e3/uL    Absolute Basophils 0.0 0.0 - 0.2 10e3/uL    Absolute Immature Granulocytes 0.0 <=0.4 10e3/uL    Absolute NRBCs 0.0 10e3/uL               Answers for HPI/ROS submitted by the patient on 12/30/2021  If you checked off any problems, how difficult have these problems made it for you to do your work, take care of things at home, or get along with other people?: Not difficult at all  PHQ9 TOTAL SCORE: 4  GENESIS 7 TOTAL SCORE: 3

## 2021-12-30 NOTE — NURSING NOTE
"Chief Complaint   Patient presents with     Abdominal Pain         Initial /80   Pulse 108   Temp 100.3  F (37.9  C) (Tympanic)   Resp 16   Ht 1.562 m (5' 1.5\")   Wt 73.8 kg (162 lb 9.6 oz)   SpO2 98%   Breastfeeding No   BMI 30.23 kg/m   Estimated body mass index is 30.23 kg/m  as calculated from the following:    Height as of this encounter: 1.562 m (5' 1.5\").    Weight as of this encounter: 73.8 kg (162 lb 9.6 oz).       Medication Reconciliation: Complete      FOOD SECURITY SCREENING QUESTIONS:    The next two questions are to help us understand your food security.  If you are feeling you need any assistance in this area, we have resources available to support you today.    Hunger Vital Signs:  Have you worried about running out of food and not having money to buy more?Never        Norma J. Gosselin, KENNETH   "

## 2021-12-31 ENCOUNTER — TELEPHONE (OUTPATIENT)
Dept: FAMILY MEDICINE | Facility: OTHER | Age: 86
End: 2021-12-31
Payer: COMMERCIAL

## 2021-12-31 DIAGNOSIS — E55.9 VITAMIN D DEFICIENCY: Primary | ICD-10-CM

## 2021-12-31 RX ORDER — VITAMIN B COMPLEX
1 TABLET ORAL DAILY
Qty: 90 TABLET | Refills: 1 | Status: SHIPPED | OUTPATIENT
Start: 2021-12-31 | End: 2022-03-03

## 2021-12-31 ASSESSMENT — PATIENT HEALTH QUESTIONNAIRE - PHQ9: SUM OF ALL RESPONSES TO PHQ QUESTIONS 1-9: 4

## 2021-12-31 ASSESSMENT — ANXIETY QUESTIONNAIRES: GAD7 TOTAL SCORE: 3

## 2021-12-31 NOTE — TELEPHONE ENCOUNTER
Dr. Destin Tenorio,     Patient saw you yesterday and tested positive for COVID. , Angelo, states you had said due to her age that she would be a high priority candidate for monoclonal antibodies and that the clinic would be contacting him today. He has not heard from anyone yet today and is calling on the status of the order. Please advise. Thank you!      Leesa Dent RN on 12/31/2021 at 2:24 PM  [facility]

## 2021-12-31 NOTE — TELEPHONE ENCOUNTER
Patients  mentioned Leesa getting back to him about antibody information regarding covid. Please have Leesa call when possible.    Yamilet Cottrell on 12/31/2021 at 2:31 PM

## 2021-12-31 NOTE — TELEPHONE ENCOUNTER
BENT[facility]-patient  is looking for orders for treatment for COVID     Please call and advise    Thank You    Isabell Christensen on 12/31/2021 at 2:03 PM

## 2022-01-19 DIAGNOSIS — I10 ESSENTIAL HYPERTENSION: ICD-10-CM

## 2022-01-19 DIAGNOSIS — K21.9 GASTROESOPHAGEAL REFLUX DISEASE WITHOUT ESOPHAGITIS: ICD-10-CM

## 2022-01-19 DIAGNOSIS — F41.9 ANXIETY: ICD-10-CM

## 2022-01-19 DIAGNOSIS — R41.89 COGNITIVE DECLINE: ICD-10-CM

## 2022-01-20 RX ORDER — METOPROLOL SUCCINATE 50 MG/1
TABLET, EXTENDED RELEASE ORAL
Qty: 90 TABLET | Refills: 3 | Status: SHIPPED | OUTPATIENT
Start: 2022-01-20 | End: 2023-03-07

## 2022-01-20 RX ORDER — MEMANTINE HYDROCHLORIDE 10 MG/1
TABLET ORAL
Qty: 180 TABLET | Refills: 3 | Status: SHIPPED | OUTPATIENT
Start: 2022-01-20 | End: 2023-03-07

## 2022-01-20 NOTE — TELEPHONE ENCOUNTER
"Ranulfo oK  RN unable to approve per Medication Refill Protocol requirements. Please review, thank you. Leesa Dent RN  1/20/2022  10:47 AM    Last Prescription Date: 10/21/21  Last Qty/Refills: 45 / 0  Last Office Visit: 12/30/21 Destin  Future Office Visit: 2/10/22 Melany     Requested Prescriptions   Pending Prescriptions Disp Refills     venlafaxine (EFFEXOR) 37.5 MG tablet [Pharmacy Med Name: VENLAFAXINE 37.5MG TABLETS] 45 tablet 0     Sig: TAKE 1/2 TABLET BY MOUTH DAILY       Serotonin-Norepinephrine Reuptake Inhibitors  Failed - 1/19/2022  5:22 AM        Failed - Normal serum creatinine on file in past 12 months     Recent Labs   Lab Test 07/27/20  1629   CR 0.96       Ok to refill medication if creatinine is low          Passed - Blood pressure under 140/90 in past 12 months     BP Readings from Last 3 Encounters:   12/30/21 139/80   02/22/21 128/78   09/14/20 132/80                 Passed - Recent (12 mo) or future (30 days) visit within the authorizing provider's specialty     Patient has had an office visit with the authorizing provider or a provider within the authorizing providers department within the previous 12 mos or has a future within next 30 days. See \"Patient Info\" tab in inbasket, or \"Choose Columns\" in Meds & Orders section of the refill encounter.              Passed - Medication is active on med list        Passed - Patient is age 18 or older        Passed - No active pregnancy on record        Passed - No positive pregnancy test in past 12 months           metoprolol succinate ER (TOPROL-XL) 50 MG 24 hr tablet [Pharmacy Med Name: METOPROLOL ER SUCCINATE 50MG TABS] 90 tablet 0     Sig: TAKE 1/2 TABLET(25 MG) BY MOUTH TWICE DAILY       Beta-Blockers Protocol Passed - 1/19/2022  5:22 AM        Passed - Blood pressure under 140/90 in past 12 months     BP Readings from Last 3 Encounters:   12/30/21 139/80   02/22/21 128/78   09/14/20 132/80                 Passed - Patient is age 6 " "or older        Passed - Recent (12 mo) or future (30 days) visit within the authorizing provider's specialty     Patient has had an office visit with the authorizing provider or a provider within the authorizing providers department within the previous 12 mos or has a future within next 30 days. See \"Patient Info\" tab in inbasket, or \"Choose Columns\" in Meds & Orders section of the refill encounter.              Passed - Medication is active on med list           memantine (NAMENDA) 10 MG tablet [Pharmacy Med Name: MEMANTINE 10MG TABLETS] 180 tablet 0     Sig: TAKE 1 TABLET(10 MG) BY MOUTH TWICE DAILY       Miscellaneous Dementia Agents Passed - 1/19/2022  5:22 AM        Passed - Recent (12 mo) or future (30 days) visit within the authorizing provider's specialty     Patient has had an office visit with the authorizing provider or a provider within the authorizing providers department within the previous 12 mos or has a future within next 30 days. See \"Patient Info\" tab in inbasket, or \"Choose Columns\" in Meds & Orders section of the refill encounter.              Passed - Medication is active on med list        Passed - Patient is 18 years of age or older           omeprazole (PRILOSEC) 20 MG DR capsule [Pharmacy Med Name: OMEPRAZOLE 20MG CAPSULES] 90 capsule 0     Sig: TAKE 1 CAPSULE(20 MG) BY MOUTH DAILY       PPI Protocol Passed - 1/19/2022  5:22 AM        Passed - Not on Clopidogrel (unless Pantoprazole ordered)        Passed - No diagnosis of osteoporosis on record        Passed - Recent (12 mo) or future (30 days) visit within the authorizing provider's specialty     Patient has had an office visit with the authorizing provider or a provider within the authorizing providers department within the previous 12 mos or has a future within next 30 days. See \"Patient Info\" tab in inbasket, or \"Choose Columns\" in Meds & Orders section of the refill encounter.              Passed - Medication is active on med list    "     Passed - Patient is age 18 or older        Passed - No active pregnacy on record        Passed - No positive pregnancy test in past 12 months

## 2022-01-25 RX ORDER — VENLAFAXINE 37.5 MG/1
TABLET ORAL
Qty: 45 TABLET | Refills: 0 | Status: SHIPPED | OUTPATIENT
Start: 2022-01-25 | End: 2022-03-03

## 2022-02-10 ENCOUNTER — OFFICE VISIT (OUTPATIENT)
Dept: FAMILY MEDICINE | Facility: OTHER | Age: 87
End: 2022-02-10
Attending: FAMILY MEDICINE
Payer: COMMERCIAL

## 2022-02-10 VITALS
SYSTOLIC BLOOD PRESSURE: 154 MMHG | RESPIRATION RATE: 18 BRPM | TEMPERATURE: 97.3 F | BODY MASS INDEX: 30.67 KG/M2 | DIASTOLIC BLOOD PRESSURE: 80 MMHG | WEIGHT: 165 LBS | OXYGEN SATURATION: 99 % | HEART RATE: 110 BPM

## 2022-02-10 DIAGNOSIS — R11.0 NAUSEA: ICD-10-CM

## 2022-02-10 DIAGNOSIS — F03.90 DEMENTIA WITHOUT BEHAVIORAL DISTURBANCE, UNSPECIFIED DEMENTIA TYPE: Primary | ICD-10-CM

## 2022-02-10 DIAGNOSIS — R53.83 OTHER FATIGUE: ICD-10-CM

## 2022-02-10 DIAGNOSIS — E55.9 VITAMIN D DEFICIENCY: ICD-10-CM

## 2022-02-10 DIAGNOSIS — F41.9 ANXIETY: ICD-10-CM

## 2022-02-10 PROBLEM — R41.89 COGNITIVE DECLINE: Status: RESOLVED | Noted: 2019-08-12 | Resolved: 2022-02-10

## 2022-02-10 PROBLEM — F41.1 ANXIETY STATE: Status: RESOLVED | Noted: 2018-02-01 | Resolved: 2022-02-10

## 2022-02-10 LAB
DEPRECATED CALCIDIOL+CALCIFEROL SERPL-MC: 29 UG/L (ref 30–100)
VIT B12 SERPL-MCNC: 379 PG/ML (ref 180–914)

## 2022-02-10 PROCEDURE — 36415 COLL VENOUS BLD VENIPUNCTURE: CPT | Mod: ZL | Performed by: FAMILY MEDICINE

## 2022-02-10 PROCEDURE — G0463 HOSPITAL OUTPT CLINIC VISIT: HCPCS

## 2022-02-10 PROCEDURE — 82607 VITAMIN B-12: CPT | Mod: ZL | Performed by: FAMILY MEDICINE

## 2022-02-10 PROCEDURE — 82306 VITAMIN D 25 HYDROXY: CPT | Mod: ZL | Performed by: FAMILY MEDICINE

## 2022-02-10 PROCEDURE — 99214 OFFICE O/P EST MOD 30 MIN: CPT | Performed by: FAMILY MEDICINE

## 2022-02-10 RX ORDER — PAROXETINE 10 MG/1
TABLET, FILM COATED ORAL
Qty: 60 TABLET | Refills: 0 | Status: SHIPPED | OUTPATIENT
Start: 2022-02-10 | End: 2022-03-03

## 2022-02-10 RX ORDER — VENLAFAXINE 37.5 MG/1
TABLET ORAL
Qty: 45 TABLET | Refills: 1 | Status: CANCELLED | OUTPATIENT
Start: 2022-02-10

## 2022-02-10 RX ORDER — ONDANSETRON 4 MG/1
4 TABLET, ORALLY DISINTEGRATING ORAL EVERY 8 HOURS PRN
Qty: 20 TABLET | Refills: 0 | Status: SHIPPED | OUTPATIENT
Start: 2022-02-10

## 2022-02-10 ASSESSMENT — PATIENT HEALTH QUESTIONNAIRE - PHQ9
SUM OF ALL RESPONSES TO PHQ QUESTIONS 1-9: 4
SUM OF ALL RESPONSES TO PHQ QUESTIONS 1-9: 4
10. IF YOU CHECKED OFF ANY PROBLEMS, HOW DIFFICULT HAVE THESE PROBLEMS MADE IT FOR YOU TO DO YOUR WORK, TAKE CARE OF THINGS AT HOME, OR GET ALONG WITH OTHER PEOPLE: NOT DIFFICULT AT ALL

## 2022-02-10 ASSESSMENT — ANXIETY QUESTIONNAIRES
4. TROUBLE RELAXING: NOT AT ALL
1. FEELING NERVOUS, ANXIOUS, OR ON EDGE: SEVERAL DAYS
7. FEELING AFRAID AS IF SOMETHING AWFUL MIGHT HAPPEN: NOT AT ALL
5. BEING SO RESTLESS THAT IT IS HARD TO SIT STILL: NOT AT ALL
6. BECOMING EASILY ANNOYED OR IRRITABLE: SEVERAL DAYS
GAD7 TOTAL SCORE: 3
7. FEELING AFRAID AS IF SOMETHING AWFUL MIGHT HAPPEN: NOT AT ALL
2. NOT BEING ABLE TO STOP OR CONTROL WORRYING: SEVERAL DAYS
3. WORRYING TOO MUCH ABOUT DIFFERENT THINGS: NOT AT ALL

## 2022-02-10 ASSESSMENT — PAIN SCALES - GENERAL: PAINLEVEL: NO PAIN (0)

## 2022-02-10 NOTE — NURSING NOTE
Patient is here for medication follow up, states a general check up. Still having some problems with acid reflux. Added vit d and pepcid about 10 weeks ago.     No LMP recorded. Patient has had a hysterectomy.  Medication Reconciliation: complete    FOOD SECURITY SCREENING QUESTIONS  Hunger Vital Signs:  Within the past 12 months we worried whether our food would run out before we got money to buy more. Never  Within the past 12 months the food we bought just didn't last and we didn't have money to get more. Never  Janice Fiore LPN 2/10/2022 1:51 PM       Advance care directive on file? yes  Advance care directive provided to patient? na       Janice Fiore LPN

## 2022-02-10 NOTE — PROGRESS NOTES
"Assessment & Plan     Dementia without behavioral disturbance, unspecified dementia type (H)    Doing well support from her family and .  Will continue on Namenda and Remeron as previously ordered by her last provider.      Anxiety  Patient and  expressed increased anxiety.  Will discontinue Effexor as it is at a subtherapeutic dose and start Paxil as ordered.  Follow-up in 1 month  - PARoxetine (PAXIL) 10 MG tablet; 1/2 tab po qday x 2 weeks then increase to 1 tab daily    Vitamin D deficiency  Will increase vitamin D3 2000 units daily  - Vitamin D Total; Future  - Vitamin D Total    Nausea  Episodes of severe nausea and epigastric pain I suspect from hiatal hernia based on description.  Trial of Zofran at onset.  - ondansetron (ZOFRAN-ODT) 4 MG ODT tab; Take 1 tablet (4 mg) by mouth every 8 hours as needed for nausea    Other fatigue    - Vitamin B12; Future  - Vitamin B12    Patient Instructions   Stop effexor  Start new medication for mood, Paxil 10 mg 1/2 tab daily for 2 weeks then increase to 1 tab daily    Try zofran on the next \"hiatal hernia attack\"     Recheck vit d level    See me again in 3-4 week             BMI:   Estimated body mass index is 30.67 kg/m  as calculated from the following:    Height as of 12/30/21: 1.562 m (5' 1.5\").    Weight as of this encounter: 74.8 kg (165 lb).   Weight management plan noted, stable and monitoring    See Patient Instructions    No follow-ups on file.    Skye Mosley MD  [facility]    Subjective     Mervat Lockett is a 89 year old female who presents today for the following   health issues:  Nursing Notes:   Janice Fiore LPN  2/10/2022  1:48 PM  Incomplete  Patient is here for medication follow up, states a general check up. Still having some problems with acid reflux. Added vit d and pepcid about 10 weeks ago.       89 yo female presents to [facility] care.  Her  is present and provides most of the history.  " "Morning struggles with her memory.  She was tried on Aricept which caused diarrhea.  She is now on Namenda.  He does think it is helped some with agitation.  She still remains quite anxious.  She has been on Effexor 37.5 mg a half a tab daily for over 10 years.  She tried increasing the dose and it increased her agitation.  No other medications have been tried.  She is on Remeron at bedtime which seems to provide restful sleep.    Her other main concern is epigastric pain related to hiatal hernia.   describes \"attacks\" that occur 1-2 times per month.      Review of Systems  Constitutional, HEENT, cardiovascular, pulmonary, gi and gu systems are negative, except as otherwise noted.    Objective   There were no vitals taken for this visit.  There is no height or weight on file to calculate BMI.    Physical Exam  GENERAL: healthy, alert and no distress  RESP: lungs clear to auscultation - no rales, rhonchi or wheezes  CV: regular rate and rhythm, normal S1 S2, no S3 or S4, no murmur, click or rub, no peripheral edema and peripheral pulses strong  NEURO: Normal strength and tone, mentation intact and speech normal  PSYCH: pleasantly demented    Results for orders placed or performed in visit on 02/10/22   Vitamin B12     Status: Normal   Result Value Ref Range    Vitamin B12 379 180 - 914 pg/mL   Vitamin D Total     Status: Abnormal   Result Value Ref Range    Vitamin D, Total (25-Hydroxy) 29 (L) 30 - 100 ug/L                Answers for HPI/ROS submitted by the patient on 2/10/2022  If you checked off any problems, how difficult have these problems made it for you to do your work, take care of things at home, or get along with other people?: Not difficult at all  PHQ9 TOTAL SCORE: 4  GENESIS 7 TOTAL SCORE: 3  Depression/Anxiety: Depression & Anxiety  Status since last visit:: worse  Anxiety since last: : medium  Other associated symptoms of depression:: Yes  Other associated symotome: : Yes  Significant life event: : " No  Anxious:: Yes  Current substance use:: No  How many servings of fruits and vegetables do you eat daily?: 0-1  On average, how many sweetened beverages do you drink each day (Examples: soda, juice, sweet tea, etc.  Do NOT count diet or artificially sweetened beverages)?: 3  How many minutes a day do you exercise enough to make your heart beat faster?: 9 or less  How many days per week do you miss taking your medication?: 0

## 2022-02-10 NOTE — PATIENT INSTRUCTIONS
"Stop effexor  Start new medication for mood, Paxil 10 mg 1/2 tab daily for 2 weeks then increase to 1 tab daily    Try zofran on the next \"hiatal hernia attack\"     Recheck vit d level    See me again in 3-4 week  "

## 2022-02-11 ASSESSMENT — PATIENT HEALTH QUESTIONNAIRE - PHQ9: SUM OF ALL RESPONSES TO PHQ QUESTIONS 1-9: 4

## 2022-02-11 ASSESSMENT — ANXIETY QUESTIONNAIRES: GAD7 TOTAL SCORE: 3

## 2022-03-03 ENCOUNTER — OFFICE VISIT (OUTPATIENT)
Dept: FAMILY MEDICINE | Facility: OTHER | Age: 87
End: 2022-03-03
Attending: FAMILY MEDICINE
Payer: COMMERCIAL

## 2022-03-03 VITALS
HEART RATE: 77 BPM | OXYGEN SATURATION: 98 % | BODY MASS INDEX: 31.34 KG/M2 | DIASTOLIC BLOOD PRESSURE: 66 MMHG | WEIGHT: 168.6 LBS | SYSTOLIC BLOOD PRESSURE: 138 MMHG | RESPIRATION RATE: 18 BRPM | TEMPERATURE: 97.6 F

## 2022-03-03 DIAGNOSIS — E55.9 VITAMIN D DEFICIENCY: ICD-10-CM

## 2022-03-03 DIAGNOSIS — F41.9 ANXIETY: ICD-10-CM

## 2022-03-03 DIAGNOSIS — F03.90 DEMENTIA WITHOUT BEHAVIORAL DISTURBANCE, UNSPECIFIED DEMENTIA TYPE: Primary | ICD-10-CM

## 2022-03-03 PROBLEM — F34.1 DYSTHYMIC DISORDER: Status: RESOLVED | Noted: 2018-02-01 | Resolved: 2022-03-03

## 2022-03-03 PROCEDURE — G0463 HOSPITAL OUTPT CLINIC VISIT: HCPCS

## 2022-03-03 PROCEDURE — 99213 OFFICE O/P EST LOW 20 MIN: CPT | Performed by: FAMILY MEDICINE

## 2022-03-03 RX ORDER — PAROXETINE 10 MG/1
TABLET, FILM COATED ORAL
Qty: 90 TABLET | Refills: 3 | Status: SHIPPED | OUTPATIENT
Start: 2022-03-03 | End: 2022-06-30

## 2022-03-03 RX ORDER — VITAMIN B COMPLEX
2 TABLET ORAL DAILY
Qty: 90 TABLET | Refills: 1 | COMMUNITY
Start: 2022-03-03 | End: 2022-08-02

## 2022-03-03 ASSESSMENT — ANXIETY QUESTIONNAIRES
7. FEELING AFRAID AS IF SOMETHING AWFUL MIGHT HAPPEN: NOT AT ALL
1. FEELING NERVOUS, ANXIOUS, OR ON EDGE: SEVERAL DAYS
GAD7 TOTAL SCORE: 1
3. WORRYING TOO MUCH ABOUT DIFFERENT THINGS: NOT AT ALL
7. FEELING AFRAID AS IF SOMETHING AWFUL MIGHT HAPPEN: NOT AT ALL
2. NOT BEING ABLE TO STOP OR CONTROL WORRYING: NOT AT ALL
6. BECOMING EASILY ANNOYED OR IRRITABLE: NOT AT ALL
5. BEING SO RESTLESS THAT IT IS HARD TO SIT STILL: NOT AT ALL
GAD7 TOTAL SCORE: 1
4. TROUBLE RELAXING: NOT AT ALL
GAD7 TOTAL SCORE: 1

## 2022-03-03 ASSESSMENT — PATIENT HEALTH QUESTIONNAIRE - PHQ9
SUM OF ALL RESPONSES TO PHQ QUESTIONS 1-9: 3
10. IF YOU CHECKED OFF ANY PROBLEMS, HOW DIFFICULT HAVE THESE PROBLEMS MADE IT FOR YOU TO DO YOUR WORK, TAKE CARE OF THINGS AT HOME, OR GET ALONG WITH OTHER PEOPLE: SOMEWHAT DIFFICULT
SUM OF ALL RESPONSES TO PHQ QUESTIONS 1-9: 3

## 2022-03-03 ASSESSMENT — PAIN SCALES - GENERAL: PAINLEVEL: NO PAIN (0)

## 2022-03-03 NOTE — PROGRESS NOTES
Assessment & Plan     Dementia without behavioral disturbance, unspecified dementia type (H)  Stable    Vitamin D deficiency  Recently increased to 2000 units daily  - Vitamin D3 (CHOLECALCIFEROL) 25 mcg (1000 units) tablet; Take 2 tablets (50 mcg) by mouth daily    Anxiety  Improvement since switching to Paxil.  - PARoxetine (PAXIL) 10 MG tablet; 1 tab daily    Patient Instructions   Vit d 3 2000 units daily, recheck lab only in 4 weeks    Stay on Paxil 10 mg 1 tab daily  '       See Patient Instructions    No follow-ups on file.    Skye Mosley MD  [facility]    Subjective     Mervat Lockett is a 89 year old female who presents today for the following   health issues:    Nursing Notes:   Janice Fiore LPN  3/3/2022 11:41 AM  Signed  Patient is here with  to follow up on medications and labs.       No LMP recorded (lmp unknown). Patient has had a hysterectomy.  Medication Reconciliation: complete    FOOD SECURITY SCREENING QUESTIONS  Hunger Vital Signs:  Within the past 12 months we worried whether our food would run out before we got money to buy more. Never  Within the past 12 months the food we bought just didn't last and we didn't have money to get more. Never  Janice Fiore LPN 3/3/2022 11:34 AM       Advance care directive on file? Yes   Advance care directive provided to patient? na       Janice Fiore LPN    Pleasant 89-year-old female presents with her  for recheck on dementia and generalized anxiety disorder.  Last month we switched from Effexor to Paxil.   has noticed an improvement in her mood.  She still complains of being tired.  Her vitamin D level was also low and we increased her vitamin D3 supplement to 2000 units daily    Acid reflux is under better control and she has had no severe episodes.    She denies any side effects from the Paxil.  She occasionally wanders at night, typically ends up in the recliner in the living room early  morning.      Review of Systems  Constitutional, HEENT, cardiovascular, pulmonary, gi and gu systems are negative, except as otherwise noted.    Objective   /66   Pulse 77   Temp 97.6  F (36.4  C) (Tympanic)   Resp 18   Wt 76.5 kg (168 lb 9.6 oz)   LMP  (LMP Unknown)   SpO2 98%   Breastfeeding No   BMI 31.34 kg/m    Body mass index is 31.34 kg/m .    Physical Exam  Neurological:      Mental Status: She is alert.   Psychiatric:         Mood and Affect: Mood normal.         Behavior: Behavior normal.         Thought Content: Thought content normal.         Judgment: Judgment normal.             Office Visit on 02/10/2022   Component Date Value Ref Range Status     Vitamin B12 02/10/2022 379  180 - 914 pg/mL Final     Vitamin D, Total (25-Hydroxy) 02/10/2022 29 (A) 30 - 100 ug/L Final    Deficiency:          <10 ug/L  Insufficiency:       10-29 ug/L  Sufficiency:          ug/L  Possible Toxicity:   >100 ug/L                Answers for HPI/ROS submitted by the patient on 3/3/2022  If you checked off any problems, how difficult have these problems made it for you to do your work, take care of things at home, or get along with other people?: Somewhat difficult  PHQ9 TOTAL SCORE: 3  GENESIS 7 TOTAL SCORE: 1

## 2022-03-03 NOTE — NURSING NOTE
Patient is here with  to follow up on medications and labs.       No LMP recorded (lmp unknown). Patient has had a hysterectomy.  Medication Reconciliation: complete    FOOD SECURITY SCREENING QUESTIONS  Hunger Vital Signs:  Within the past 12 months we worried whether our food would run out before we got money to buy more. Never  Within the past 12 months the food we bought just didn't last and we didn't have money to get more. Never  Janice Fiore LPN 3/3/2022 11:34 AM       Advance care directive on file? Yes   Advance care directive provided to patient? na       Janice Fiore LPN

## 2022-03-04 ASSESSMENT — PATIENT HEALTH QUESTIONNAIRE - PHQ9: SUM OF ALL RESPONSES TO PHQ QUESTIONS 1-9: 3

## 2022-03-04 ASSESSMENT — ANXIETY QUESTIONNAIRES: GAD7 TOTAL SCORE: 1

## 2022-03-24 ENCOUNTER — TELEPHONE (OUTPATIENT)
Dept: LAB | Facility: OTHER | Age: 87
End: 2022-03-24
Payer: COMMERCIAL

## 2022-03-24 DIAGNOSIS — E55.9 VITAMIN D DEFICIENCY: Primary | ICD-10-CM

## 2022-03-31 ENCOUNTER — LAB (OUTPATIENT)
Dept: LAB | Facility: OTHER | Age: 87
End: 2022-03-31
Attending: FAMILY MEDICINE
Payer: MEDICARE

## 2022-03-31 DIAGNOSIS — E55.9 VITAMIN D DEFICIENCY: ICD-10-CM

## 2022-03-31 LAB — DEPRECATED CALCIDIOL+CALCIFEROL SERPL-MC: 36 UG/L (ref 30–100)

## 2022-03-31 PROCEDURE — 36415 COLL VENOUS BLD VENIPUNCTURE: CPT | Mod: ZL

## 2022-03-31 PROCEDURE — 82306 VITAMIN D 25 HYDROXY: CPT | Mod: ZL

## 2022-04-12 DIAGNOSIS — K21.9 GASTROESOPHAGEAL REFLUX DISEASE WITHOUT ESOPHAGITIS: ICD-10-CM

## 2022-04-13 RX ORDER — FAMOTIDINE 20 MG/1
20 TABLET, FILM COATED ORAL 2 TIMES DAILY PRN
Qty: 90 TABLET | Refills: 1 | Status: SHIPPED | OUTPATIENT
Start: 2022-04-13 | End: 2022-08-01

## 2022-04-13 NOTE — TELEPHONE ENCOUNTER
"[id number] sent Rx request for the following:      Requested Prescriptions   Pending Prescriptions Disp Refills     famotidine (PEPCID) 20 MG tablet 90 tablet 1     Sig: Take 1 tablet (20 mg) by mouth 2 times daily as needed (acid reflux)       H2 Blockers Protocol Failed - 4/12/2022  2:57 PM        Failed - Recent (12 mo) or future (30 days) visit within the authorizing provider's specialty     Patient has had an office visit with the authorizing provider or a provider within the authorizing providers department within the previous 12 mos or has a future within next 30 days. See \"Patient Info\" tab in inbasket, or \"Choose Columns\" in Meds & Orders section of the refill encounter.          Passed - Patient is age 12 or older        Passed - Medication is active on med list     Last Prescription Date:   12/30/21  Last Fill Qty/Refills:         90, R-1  Last Office Visit:              03/03/22  Future Office visit:           None      Charmaine Sevilla RN .............. 4/13/2022  9:20 AM       "

## 2022-04-19 DIAGNOSIS — F41.9 ANXIETY: ICD-10-CM

## 2022-04-20 RX ORDER — VENLAFAXINE 37.5 MG/1
TABLET ORAL
Qty: 45 TABLET | Refills: 0 | OUTPATIENT
Start: 2022-04-20

## 2022-04-20 NOTE — TELEPHONE ENCOUNTER
"Effexor noted as discontinued  LOV: 3/3/22  Per OV   \".  Last month we switched from Effexor to Paxil\"    \"Stay on Paxil 10 mg 1 tab daily\"    Kira Rossi RN on 4/20/2022 at 8:04 AM               "

## 2022-05-09 DIAGNOSIS — F41.9 ANXIETY: ICD-10-CM

## 2022-05-10 RX ORDER — MIRTAZAPINE 15 MG/1
TABLET, FILM COATED ORAL
Qty: 45 TABLET | Refills: 2 | Status: SHIPPED | OUTPATIENT
Start: 2022-05-10 | End: 2023-03-07

## 2022-05-10 NOTE — TELEPHONE ENCOUNTER
"[facility] #[id number]  sent Rx request for the following:      Requested Prescriptions   Pending Prescriptions Disp Refills     mirtazapine (REMERON) 15 MG tablet [Pharmacy Med Name: MIRTAZAPINE 15MG TABLETS] 45 tablet 2     Sig: TAKE 1/2 TABLET(7.5 MG) BY MOUTH AT BEDTIME       Atypical Antidepressants Protocol Failed - 5/9/2022  3:55 AM        Failed - Recent (12 mo) or future (30 days) visit within the authorizing provider's specialty     Patient has had an office visit with the authorizing provider or a provider within the authorizing providers department within the previous 12 mos or has a future within next 30 days. See \"Patient Info\" tab in inbasket, or \"Choose Columns\" in Meds & Orders section of the refill encounter.          Passed - Medication active on med list        Passed - Patient is age 18 or older        Passed - No active pregnancy on record        Passed - No positive pregnancy test in past 12 mos     Last Prescription Date:   06/08/21  Last Fill Qty/Refills:         45, R-2  Last Office Visit:              03/03/22   Future Office visit:           None    Charmaine Sevilla RN .............. 5/10/2022  1:29 PM         "

## 2022-05-18 ENCOUNTER — ALLIED HEALTH/NURSE VISIT (OUTPATIENT)
Dept: FAMILY MEDICINE | Facility: OTHER | Age: 87
End: 2022-05-18
Attending: INTERNAL MEDICINE
Payer: MEDICARE

## 2022-05-18 DIAGNOSIS — Z23 HIGH PRIORITY FOR 2019-NCOV VACCINE: Primary | ICD-10-CM

## 2022-05-18 PROCEDURE — 0054A COVID-19,PF,PFIZER (12+ YRS): CPT

## 2022-06-30 ENCOUNTER — OFFICE VISIT (OUTPATIENT)
Dept: FAMILY MEDICINE | Facility: OTHER | Age: 87
End: 2022-06-30
Attending: FAMILY MEDICINE
Payer: COMMERCIAL

## 2022-06-30 VITALS
OXYGEN SATURATION: 96 % | HEART RATE: 78 BPM | SYSTOLIC BLOOD PRESSURE: 148 MMHG | TEMPERATURE: 98.1 F | RESPIRATION RATE: 18 BRPM | BODY MASS INDEX: 31.42 KG/M2 | WEIGHT: 169 LBS | DIASTOLIC BLOOD PRESSURE: 80 MMHG

## 2022-06-30 DIAGNOSIS — R53.83 OTHER FATIGUE: Primary | ICD-10-CM

## 2022-06-30 DIAGNOSIS — F41.9 ANXIETY: ICD-10-CM

## 2022-06-30 DIAGNOSIS — D64.9 NORMOCYTIC ANEMIA: ICD-10-CM

## 2022-06-30 DIAGNOSIS — R21 RASH: ICD-10-CM

## 2022-06-30 LAB
BASOPHILS # BLD AUTO: 0 10E3/UL (ref 0–0.2)
BASOPHILS NFR BLD AUTO: 1 %
EOSINOPHIL # BLD AUTO: 0.4 10E3/UL (ref 0–0.7)
EOSINOPHIL NFR BLD AUTO: 6 %
ERYTHROCYTE [DISTWIDTH] IN BLOOD BY AUTOMATED COUNT: 14.8 % (ref 10–15)
HCT VFR BLD AUTO: 37.6 % (ref 35–47)
HGB BLD-MCNC: 11.3 G/DL (ref 11.7–15.7)
IMM GRANULOCYTES # BLD: 0 10E3/UL
IMM GRANULOCYTES NFR BLD: 0 %
LYMPHOCYTES # BLD AUTO: 1.4 10E3/UL (ref 0.8–5.3)
LYMPHOCYTES NFR BLD AUTO: 25 %
MCH RBC QN AUTO: 27.2 PG (ref 26.5–33)
MCHC RBC AUTO-ENTMCNC: 30.1 G/DL (ref 31.5–36.5)
MCV RBC AUTO: 90 FL (ref 78–100)
MONOCYTES # BLD AUTO: 0.6 10E3/UL (ref 0–1.3)
MONOCYTES NFR BLD AUTO: 9 %
NEUTROPHILS # BLD AUTO: 3.5 10E3/UL (ref 1.6–8.3)
NEUTROPHILS NFR BLD AUTO: 59 %
NRBC # BLD AUTO: 0 10E3/UL
NRBC BLD AUTO-RTO: 0 /100
PLATELET # BLD AUTO: 195 10E3/UL (ref 150–450)
RBC # BLD AUTO: 4.16 10E6/UL (ref 3.8–5.2)
WBC # BLD AUTO: 5.8 10E3/UL (ref 4–11)

## 2022-06-30 PROCEDURE — 36415 COLL VENOUS BLD VENIPUNCTURE: CPT | Mod: ZL | Performed by: FAMILY MEDICINE

## 2022-06-30 PROCEDURE — 85025 COMPLETE CBC W/AUTO DIFF WBC: CPT | Mod: ZL | Performed by: FAMILY MEDICINE

## 2022-06-30 PROCEDURE — 99214 OFFICE O/P EST MOD 30 MIN: CPT | Performed by: FAMILY MEDICINE

## 2022-06-30 PROCEDURE — G0463 HOSPITAL OUTPT CLINIC VISIT: HCPCS

## 2022-06-30 RX ORDER — TRIAMCINOLONE ACETONIDE 1 MG/G
CREAM TOPICAL 2 TIMES DAILY
Qty: 30 G | Refills: 0 | Status: SHIPPED | OUTPATIENT
Start: 2022-06-30 | End: 2023-03-07

## 2022-06-30 RX ORDER — PAROXETINE 10 MG/1
TABLET, FILM COATED ORAL
Qty: 90 TABLET | Refills: 3 | COMMUNITY
Start: 2022-06-30 | End: 2023-03-07

## 2022-06-30 ASSESSMENT — ENCOUNTER SYMPTOMS: FATIGUE: 1

## 2022-06-30 ASSESSMENT — PAIN SCALES - GENERAL: PAINLEVEL: NO PAIN (0)

## 2022-06-30 NOTE — NURSING NOTE
Patient is here for rash on right leg and feeling fatigue.     No LMP recorded (lmp unknown). Patient has had a hysterectomy.  Medication Reconciliation: complete    FOOD SECURITY SCREENING QUESTIONS  Hunger Vital Signs:  Within the past 12 months we worried whether our food would run out before we got money to buy more. Never  Within the past 12 months the food we bought just didn't last and we didn't have money to get more. Never  Janice Fiore LPN 6/30/2022 11:24 AM       Advance care directive on file? yes  Advance care directive provided to patient? na       Janice Fiore LPN

## 2022-06-30 NOTE — PROGRESS NOTES
Assessment & Plan     Other fatigue  Multifactorial.  May be related to recent addition of Paxil.  We will switch to p.m. dosing.  Discourage naps.  Hgb trending down, normocytic. unclear etiology, additional labs ordered today.  - CBC and Differential; Future  - CBC and Differential    Rash  Continue intermittent use of mid pot steroid cream on lower leg  - triamcinolone (KENALOG) 0.1 % external cream; Apply topically 2 times daily    Anxiety  Recently started on Paxil, may be contributing to fatigue. Recommend switching dosage to evening hours  - PARoxetine (PAXIL) 10 MG tablet; 1 tab qpm(5-7 pm)             Patient Instructions   Move paxil to 5-7 pm dosing, if taken in the morning, may contribute to fatigue    Thyroid function has been checked recently and normal    Will check for anemia        No follow-ups on file.    Skye Mosley MD  [facility]    Subjective   Marluis is a 89 year old, presenting for the following health issues:    Pleasant 89-year-old female with dementia presents with her  over concerns of fatigue.   reports she wakes up in the morning around 10 AM and then typically takes a nap again around noon.  She does not have the same energy as she once did.  The patient reports that she is bored and enjoys sleeping.  She does not feel depressed.  At her last visit was started on Paxil.  No known side effects.    Denies hematuria, blood in her stools.     is concerned about hypothyroidism.  This has been checked multiple times in the past most recently within 3 months and was normal.    Previous vitamin D deficiency has been maintained on vitamin D3 2000 units daily and repeat level was normal.    Intermittent rash right lower extremity.  Mildly pruritic.  Derm Problem (Right leg) and Fatigue      Fatigue  Associated symptoms include fatigue.   History of Present Illness       Reason for visit:  Leg   blood    She eats 2-3 servings of fruits and  vegetables daily.She consumes 2 sweetened beverage(s) daily.She exercises with enough effort to increase her heart rate 9 or less minutes per day.  She exercises with enough effort to increase her heart rate 3 or less days per week.   She is taking medications regularly.             Review of Systems   Constitutional: Positive for fatigue.      Constitutional, HEENT, cardiovascular, pulmonary, gi and gu systems are negative, except as otherwise noted.      Objective    BP (!) 148/80   Pulse 78   Temp 98.1  F (36.7  C) (Tympanic)   Resp 18   Wt 76.7 kg (169 lb)   LMP  (LMP Unknown)   SpO2 96%   Breastfeeding No   BMI 31.42 kg/m    Body mass index is 31.42 kg/m .  Physical Exam  Constitutional:       Appearance: Normal appearance.   Cardiovascular:      Rate and Rhythm: Normal rate and regular rhythm.      Heart sounds: No murmur heard.  Musculoskeletal:         General: No swelling.      Cervical back: Normal range of motion. No rigidity.      Left lower leg: No edema.   Lymphadenopathy:      Cervical: No cervical adenopathy.   Skin:     Findings: Pustular rash: cbc.      Comments: Few scattered scaly macules right lower extremity no vesicles.   Neurological:      Mental Status: She is alert.   Psychiatric:         Mood and Affect: Mood normal.         Behavior: Behavior normal.         Thought Content: Thought content normal.         Judgment: Judgment normal.            Results for orders placed or performed in visit on 06/30/22   CBC with platelets and differential     Status: Abnormal   Result Value Ref Range    WBC Count 5.8 4.0 - 11.0 10e3/uL    RBC Count 4.16 3.80 - 5.20 10e6/uL    Hemoglobin 11.3 (L) 11.7 - 15.7 g/dL    Hematocrit 37.6 35.0 - 47.0 %    MCV 90 78 - 100 fL    MCH 27.2 26.5 - 33.0 pg    MCHC 30.1 (L) 31.5 - 36.5 g/dL    RDW 14.8 10.0 - 15.0 %    Platelet Count 195 150 - 450 10e3/uL    % Neutrophils 59 %    % Lymphocytes 25 %    % Monocytes 9 %    % Eosinophils 6 %    % Basophils 1 %    %  Immature Granulocytes 0 %    NRBCs per 100 WBC 0 <1 /100    Absolute Neutrophils 3.5 1.6 - 8.3 10e3/uL    Absolute Lymphocytes 1.4 0.8 - 5.3 10e3/uL    Absolute Monocytes 0.6 0.0 - 1.3 10e3/uL    Absolute Eosinophils 0.4 0.0 - 0.7 10e3/uL    Absolute Basophils 0.0 0.0 - 0.2 10e3/uL    Absolute Immature Granulocytes 0.0 <=0.4 10e3/uL    Absolute NRBCs 0.0 10e3/uL   CBC and Differential     Status: Abnormal    Narrative    The following orders were created for panel order CBC and Differential.  Procedure                               Abnormality         Status                     ---------                               -----------         ------                     CBC with platelets and d...[[id number]]  Abnormal            Final result                 Please view results for these tests on the individual orders.                   .  ..

## 2022-06-30 NOTE — PATIENT INSTRUCTIONS
Move paxil to 5-7 pm dosing, if taken in the morning, may contribute to fatigue    Thyroid function has been checked recently and normal    Will check for anemia

## 2022-07-08 ENCOUNTER — LAB (OUTPATIENT)
Dept: LAB | Facility: OTHER | Age: 87
End: 2022-07-08
Attending: INTERNAL MEDICINE
Payer: MEDICARE

## 2022-07-08 DIAGNOSIS — D64.9 NORMOCYTIC ANEMIA: ICD-10-CM

## 2022-07-08 LAB
BASOPHILS # BLD AUTO: 0 10E3/UL (ref 0–0.2)
BASOPHILS NFR BLD AUTO: 1 %
EOSINOPHIL # BLD AUTO: 0.3 10E3/UL (ref 0–0.7)
EOSINOPHIL NFR BLD AUTO: 5 %
ERYTHROCYTE [DISTWIDTH] IN BLOOD BY AUTOMATED COUNT: 14.6 % (ref 10–15)
FOLATE SERPL-MCNC: 19.8 NG/ML
HCT VFR BLD AUTO: 39.2 % (ref 35–47)
HGB BLD-MCNC: 11.9 G/DL (ref 11.7–15.7)
IMM GRANULOCYTES # BLD: 0 10E3/UL
IMM GRANULOCYTES NFR BLD: 0 %
IRON SERPL-MCNC: 56 UG/DL (ref 50–212)
LYMPHOCYTES # BLD AUTO: 1.4 10E3/UL (ref 0.8–5.3)
LYMPHOCYTES NFR BLD AUTO: 21 %
MCH RBC QN AUTO: 27 PG (ref 26.5–33)
MCHC RBC AUTO-ENTMCNC: 30.4 G/DL (ref 31.5–36.5)
MCV RBC AUTO: 89 FL (ref 78–100)
MONOCYTES # BLD AUTO: 0.5 10E3/UL (ref 0–1.3)
MONOCYTES NFR BLD AUTO: 7 %
NEUTROPHILS # BLD AUTO: 4.4 10E3/UL (ref 1.6–8.3)
NEUTROPHILS NFR BLD AUTO: 66 %
NRBC # BLD AUTO: 0 10E3/UL
NRBC BLD AUTO-RTO: 0 /100
PLATELET # BLD AUTO: 218 10E3/UL (ref 150–450)
RBC # BLD AUTO: 4.41 10E6/UL (ref 3.8–5.2)
VIT B12 SERPL-MCNC: 273 PG/ML (ref 180–914)
WBC # BLD AUTO: 6.7 10E3/UL (ref 4–11)

## 2022-07-08 PROCEDURE — 83540 ASSAY OF IRON: CPT | Mod: ZL

## 2022-07-08 PROCEDURE — 85014 HEMATOCRIT: CPT | Mod: ZL

## 2022-07-08 PROCEDURE — 82746 ASSAY OF FOLIC ACID SERUM: CPT | Mod: ZL

## 2022-07-08 PROCEDURE — 36415 COLL VENOUS BLD VENIPUNCTURE: CPT | Mod: ZL

## 2022-07-08 PROCEDURE — 82607 VITAMIN B-12: CPT | Mod: ZL

## 2022-07-30 DIAGNOSIS — K21.9 GASTROESOPHAGEAL REFLUX DISEASE WITHOUT ESOPHAGITIS: ICD-10-CM

## 2022-08-01 RX ORDER — FAMOTIDINE 20 MG/1
TABLET, FILM COATED ORAL
Qty: 90 TABLET | Refills: 1 | Status: SHIPPED | OUTPATIENT
Start: 2022-08-01 | End: 2022-10-14

## 2022-08-01 NOTE — TELEPHONE ENCOUNTER
"Routing to unit provider, pcp out of office  Skylar Sauceda RN on 8/1/2022 at 10:49 AM    Last Prescription Date: 4/13/22  Last Qty/Refills: 90 / 1  Last Office Visit: 6/30/22  Future Office Visit: None     Requested Prescriptions   Pending Prescriptions Disp Refills     famotidine (PEPCID) 20 MG tablet [Pharmacy Med Name: FAMOTIDINE 20MG TABLETS] 90 tablet 1     Sig: TAKE 1 TABLET(20 MG) BY MOUTH TWICE DAILY AS NEEDED FOR ACID REFLUX       H2 Blockers Protocol Failed - 7/30/2022  1:16 PM        Failed - Recent (12 mo) or future (30 days) visit within the authorizing provider's specialty     Patient has had an office visit with the authorizing provider or a provider within the authorizing providers department within the previous 12 mos or has a future within next 30 days. See \"Patient Info\" tab in inbasket, or \"Choose Columns\" in Meds & Orders section of the refill encounter.              Passed - Patient is age 12 or older        Passed - Medication is active on med list             "

## 2022-08-02 DIAGNOSIS — E55.9 VITAMIN D DEFICIENCY: ICD-10-CM

## 2022-08-02 NOTE — TELEPHONE ENCOUNTER
"Routing to unit provider, pcp out of office  Skylar Sauceda RN on 8/2/2022 at 4:26 PM    Last Prescription Date: 3/3/22  Last Qty/Refills: 90 / 1  Last Office Visit: 6/30/22  Future Office Visit: None     Requested Prescriptions   Pending Prescriptions Disp Refills     Vitamin D3 (CHOLECALCIFEROL) 25 mcg (1000 units) tablet 90 tablet 1     Sig: Take 2 tablets (50 mcg) by mouth daily       Vitamin Supplements (Adult) Protocol Passed - 8/2/2022  4:24 PM        Passed - High dose Vitamin D not ordered        Passed - Recent (12 mo) or future (30 days) visit within the authorizing provider's specialty     Patient has had an office visit with the authorizing provider or a provider within the authorizing providers department within the previous 12 mos or has a future within next 30 days. See \"Patient Info\" tab in inbasket, or \"Choose Columns\" in Meds & Orders section of the refill encounter.              Passed - Medication is active on med list             "

## 2022-08-04 RX ORDER — VITAMIN B COMPLEX
2 TABLET ORAL DAILY
Qty: 90 TABLET | Refills: 1 | Status: SHIPPED | OUTPATIENT
Start: 2022-08-04 | End: 2022-11-23

## 2022-08-27 ENCOUNTER — OFFICE VISIT (OUTPATIENT)
Dept: FAMILY MEDICINE | Facility: OTHER | Age: 87
End: 2022-08-27
Attending: NURSE PRACTITIONER
Payer: MEDICARE

## 2022-08-27 ENCOUNTER — HOSPITAL ENCOUNTER (OUTPATIENT)
Dept: GENERAL RADIOLOGY | Facility: OTHER | Age: 87
Discharge: HOME | End: 2022-08-27
Attending: NURSE PRACTITIONER
Payer: MEDICARE

## 2022-08-27 VITALS
BODY MASS INDEX: 31.25 KG/M2 | WEIGHT: 168.1 LBS | OXYGEN SATURATION: 96 % | HEART RATE: 74 BPM | TEMPERATURE: 98.2 F | RESPIRATION RATE: 18 BRPM | DIASTOLIC BLOOD PRESSURE: 84 MMHG | SYSTOLIC BLOOD PRESSURE: 152 MMHG

## 2022-08-27 DIAGNOSIS — M54.50 ACUTE RIGHT-SIDED LOW BACK PAIN WITHOUT SCIATICA: ICD-10-CM

## 2022-08-27 DIAGNOSIS — R10.31 RIGHT GROIN PAIN: Primary | ICD-10-CM

## 2022-08-27 DIAGNOSIS — R10.2 SUPRAPUBIC ABDOMINAL PAIN: ICD-10-CM

## 2022-08-27 DIAGNOSIS — R10.31 RIGHT GROIN PAIN: ICD-10-CM

## 2022-08-27 PROCEDURE — 72100 X-RAY EXAM L-S SPINE 2/3 VWS: CPT

## 2022-08-27 PROCEDURE — 73522 X-RAY EXAM HIPS BI 3-4 VIEWS: CPT

## 2022-08-27 PROCEDURE — 99214 OFFICE O/P EST MOD 30 MIN: CPT | Performed by: NURSE PRACTITIONER

## 2022-08-27 PROCEDURE — G0463 HOSPITAL OUTPT CLINIC VISIT: HCPCS

## 2022-08-27 PROCEDURE — G0463 HOSPITAL OUTPT CLINIC VISIT: HCPCS | Mod: 25

## 2022-08-27 RX ORDER — SENNOSIDES 8.6 MG
1300 CAPSULE ORAL EVERY 8 HOURS PRN
Qty: 180 TABLET | Refills: 3 | Status: SHIPPED | OUTPATIENT
Start: 2022-08-27

## 2022-08-27 ASSESSMENT — PAIN SCALES - GENERAL: PAINLEVEL: SEVERE PAIN (7)

## 2022-08-27 NOTE — RESULT ENCOUNTER NOTE
Please call the patient at 's cell phone with the results. No acute fractures or changes.    ALANA Pisano, AGNP-C  Internal Medicine

## 2022-08-27 NOTE — PATIENT INSTRUCTIONS
You are due for Medicare wellness visit with annual recommended screenings.  You are also due for diabetic foot exam.  You are also due for a tetanus vaccination.    You will be due for an influenza vaccine in the next month or 2.    You are also overdue for an eye exam.  If you have had 1 in the recent year, please contact your provider and have them send us a copy.

## 2022-08-27 NOTE — NURSING NOTE
"Chief Complaint   Patient presents with     Groin Pain     Back Pain     Lower      Patient is here for low back pain and groin pain. She states she has had it for years but it is worsening.     Initial BP (!) 168/86   Pulse 74   Temp 98.2  F (36.8  C) (Tympanic)   Resp 18   Wt 76.2 kg (168 lb 1.6 oz)   LMP  (LMP Unknown)   SpO2 96%   Breastfeeding No   BMI 31.25 kg/m   Estimated body mass index is 31.25 kg/m  as calculated from the following:    Height as of 12/30/21: 1.562 m (5' 1.5\").    Weight as of this encounter: 76.2 kg (168 lb 1.6 oz).  Medication Reconciliation: complete    Sharon Frank MA       FOOD SECURITY SCREENING QUESTIONS:    The next two questions are to help us understand your food security.  If you are feeling you need any assistance in this area, we have resources available to support you today.    Hunger Vital Signs:  Within the past 12 months we worried whether our food would run out before we got money to buy more. Never  Within the past 12 months the food we bought just didn't last and we didn't have money to get more. Never  Sharon Frank MA,LPN on 8/27/2022 at 4:45 PM      "

## 2022-08-27 NOTE — PROGRESS NOTES
HPI:    Mervat Lockett is a 89 year old female who presents to [facility] today accompanied by her  for severe right groin pain and right-sided lower back pain which started 2 days ago.   reports no falls.   reports he gave her to acetaminophen due to her discomfort. They have not used any ice or heat. Patient has dementia.    ROS:  Refer to HPI    BP (!) 152/84   Pulse 74   Temp 98.2  F (36.8  C) (Tympanic)   Resp 18   Wt 76.2 kg (168 lb 1.6 oz)   LMP  (LMP Unknown)   SpO2 96%   Breastfeeding No   BMI 31.25 kg/m      EXAM:  General Appearance: Well appearing female, appropriate appearance for age. No acute distress  Respiratory: normal chest wall and respirations.  Normal effort.  Clear to auscultation bilaterally, no wheezing, crackles or rhonchi.  No increased work of breathing.  No cough appreciated.  Cardiac: RRR with no murmurs  Musculoskeletal:  Equal movement of bilateral upper extremities.  Equal movement of bilateral lower extremities.  Slow antalgic gait.  Dermatological: no rashes noted of exposed skin  Psychological: normal affect, alert, oriented, and pleasant.     Diagnostics:  Results for orders placed or performed during the hospital encounter of 08/27/22   XR Pelvis and Hip Bilateral 2 Views     Status: None    Narrative    XR PELVIS AND HIP BILATERAL 2 VIEWS    HISTORY: Right groin pain; Acute right-sided low back pain without  sciatica .    COMPARISON: 6/14/2018.    TECHNIQUE: 5 views of the pelvis and hips.    FINDINGS:    Postoperative changes of prior bilateral total hip arthroplasty are  seen. No periprosthetic loosening, fracture or dislocation is  identified. Degenerative changes are seen at the SI joints.        Impression    IMPRESSION:     No evidence of acute pelvic or proximal femoral fracture.      MEL SULLIVAN MD         SYSTEM ID:  [id number]   Results for orders placed or performed during the hospital encounter of 08/27/22   XR Lumbar Spine  2/3 Views     Status: None    Narrative    XR LUMBAR SPINE 2/3 VIEWS    HISTORY: Acute right-sided low back pain without sciatica .    COMPARISON: 7/27/2020.    TECHNIQUE: 3 views lumbosacral spine.    FINDINGS:    Dextroscoliosis. Chronic diffuse facet and endplate degenerative  changes. SI joint degeneration bilaterally.        Impression    IMPRESSION:     No evidence of acute lumbar spine fracture.      MEL SULLIVAN MD         SYSTEM ID:  [id number]       ASSESSMENT/PLAN:  Mervat was seen today for groin pain and back pain.    Diagnoses and all orders for this visit:    Right groin pain  We will obtain imaging today in clinic and also rule out any urinary issues.  -     XR Pelvis and Hip Bilateral 2 Views; Imaging obtained in clinic today.   -     acetaminophen (TYLENOL) 650 MG CR tablet; Take 2 tablets (1,300 mg) by mouth every 8 hours as needed for mild pain (Not to exceed 4000 mg from all sources in 24 hour period)  -     UA with Microscopic reflex to Culture - patient unable to void in clinic. Supplies sent home for patient to return.    Acute right-sided low back pain without sciatica  We will obtain imaging today in clinic and also rule out any urinary issues.  -     XR Pelvis and Hip Bilateral 2 Views; Imaging obtained in clinic today.   -     XR Lumbar Spine 2/3 Views; Imaging obtained in clinic today.   -     acetaminophen (TYLENOL) 650 MG CR tablet; Take 2 tablets (1,300 mg) by mouth every 8 hours as needed for mild pain (Not to exceed 4000 mg from all sources in 24 hour period)  -     UA with Microscopic reflex to Culture - patient unable to void in clinic. Supplies sent home for patient to return.    Suprapubic abdominal pain  We will obtain imaging today in clinic and also rule out any urinary issues.  -     UA reflex to Microscopic and Culture - patient unable to void in clinic. Supplies sent home for patient to return.    Discussed warning signs/symptoms indicative of need to  follow-up.    Follow up with PCP or ED if symptoms persist or worsen or concerns.    I explained my diagnostic considerations and recommendations to the patient, who voiced understanding and agreement with the treatment plan. All questions were answered. We discussed potential side effects of any prescribed or recommended therapies, as well as expectations for response to treatments.    ALANA Pisano, FLACO  [facility]  08/27/2022 5:30 PM    Total time spent with this patient was 30  minutes which included chart review, visualization and interpretation of labs/images, time spent with the patient and documentation.    Past Medical History:   Diagnosis Date     ACP (advance care planning) 12/12/2013     Anemia, iron deficiency      Bilateral carpal tunnel syndrome 2/23/2018     Closed Colles' fracture of right radius with routine healing 04/13/2017     Cognitive decline      Depression with anxiety 02/01/2018    Overview:  Chronic anxiety     Dysrhythmia     With hospitalization     Hypercholesterolemia 2/1/2018     Hypertension 11/18/2010     Osteopenia      Spinal stenosis, lumbar 2/1/2018    Overview:  Degenerative lumbar disc disease with lumbar spinal stenosis     Past Surgical History:   Procedure Laterality Date     ARTHROPLASTY HIP      3/9/09,Left, by Ashish Ivory M.D.     BLEPHAROPLASTY  04/18/2016    by Dr White Left brow lift     CERVICAL POLYPECTOMY      Endocervical polyps     CYSTOCELE REPAIR      Uterine prolapse and vaginal vault prolapse with cystocele     EXTRACAPSULAR CATARACT EXTRATION WITH INTRAOCULAR LENS IMPLANT Bilateral 2010     HIP SURGERY Left 2012    Times 2     HIP SURGERY Right      HYSTERECTOMY TOTAL ABDOMINAL      Rectocele     OPEN REDUCTION INTERNAL FIXATION WRIST Right 04/21/2017    Right,Synthes stainless steel variable angle volar radial plate with T8 screw heads.     RELEASE CARPAL TUNNEL Right 4/25/2018    Procedure: RELEASE CARPAL TUNNEL;  Right Open Carpal  Tunnel Release;  Surgeon: Gerald Martínez DO;  Location: [facility] OR     SALPINGO-OOPHORECTOMY BILATERAL      4/16/98,Bilateral salpingo oophorectomy and vaginal vault suspension     Social History     Tobacco Use     Smoking status: Former Smoker     Smokeless tobacco: Never Used   Substance Use Topics     Alcohol use: Yes     Alcohol/week: 2.0 standard drinks     Comment: Alcoholic Drinks/day: 1 a night     Current Outpatient Medications   Medication Sig Dispense Refill     acetaminophen (TYLENOL) 650 MG CR tablet Take 2 tablets (1,300 mg) by mouth every 8 hours as needed for mild pain (Not to exceed 4000 mg from all sources in 24 hour period) 180 tablet 3     famotidine (PEPCID) 20 MG tablet TAKE 1 TABLET(20 MG) BY MOUTH TWICE DAILY AS NEEDED FOR ACID REFLUX 90 tablet 1     MAGNESIUM PO        memantine (NAMENDA) 10 MG tablet TAKE 1 TABLET(10 MG) BY MOUTH TWICE DAILY 180 tablet 3     metoprolol succinate ER (TOPROL-XL) 50 MG 24 hr tablet TAKE 1/2 TABLET(25 MG) BY MOUTH TWICE DAILY 90 tablet 3     mirtazapine (REMERON) 15 MG tablet TAKE 1/2 TABLET(7.5 MG) BY MOUTH AT BEDTIME 45 tablet 2     multivitamins w/minerals tablet Take 1 tablet by mouth daily       omeprazole (PRILOSEC) 20 MG DR capsule TAKE 1 CAPSULE(20 MG) BY MOUTH DAILY 90 capsule 3     ondansetron (ZOFRAN-ODT) 4 MG ODT tab Take 1 tablet (4 mg) by mouth every 8 hours as needed for nausea 20 tablet 0     PARoxetine (PAXIL) 10 MG tablet 1 tab qpm(5-7 pm) 90 tablet 3     triamcinolone (KENALOG) 0.1 % external cream Apply topically 2 times daily 30 g 0     triamcinolone (KENALOG) 0.1 % external lotion Apply topically 3 times daily 60 mL 2     Vitamin D3 (CHOLECALCIFEROL) 25 mcg (1000 units) tablet Take 2 tablets (50 mcg) by mouth daily 90 tablet 1     Allergies   Allergen Reactions     Aricept [Donepezil]      Paroxetine Anxiety       Past medical history, past surgical history, current medications and allergies reviewed and accurate to the best of my  "knowledge.      Nursing Notes:   Sharon Frank MA  8/27/2022  4:52 PM  Signed  Chief Complaint   Patient presents with     Groin Pain     Back Pain     Lower      Patient is here for low back pain and groin pain. She states she has had it for years but it is worsening.     Initial BP (!) 168/86   Pulse 74   Temp 98.2  F (36.8  C) (Tympanic)   Resp 18   Wt 76.2 kg (168 lb 1.6 oz)   LMP  (LMP Unknown)   SpO2 96%   Breastfeeding No   BMI 31.25 kg/m   Estimated body mass index is 31.25 kg/m  as calculated from the following:    Height as of 12/30/21: 1.562 m (5' 1.5\").    Weight as of this encounter: 76.2 kg (168 lb 1.6 oz).  Medication Reconciliation: complete    Sharon Frank MA       FOOD SECURITY SCREENING QUESTIONS:    The next two questions are to help us understand your food security.  If you are feeling you need any assistance in this area, we have resources available to support you today.    Hunger Vital Signs:  Within the past 12 months we worried whether our food would run out before we got money to buy more. Never  Within the past 12 months the food we bought just didn't last and we didn't have money to get more. Never  Sharon Frank MA,LPN on 8/27/2022 at 4:45 PM        "

## 2022-09-21 ENCOUNTER — LAB (OUTPATIENT)
Dept: LAB | Facility: OTHER | Age: 87
End: 2022-09-21
Payer: MEDICARE

## 2022-09-21 ENCOUNTER — TELEPHONE (OUTPATIENT)
Dept: INTERNAL MEDICINE | Facility: OTHER | Age: 87
End: 2022-09-21

## 2022-09-21 DIAGNOSIS — R39.89 URINARY PROBLEM: ICD-10-CM

## 2022-09-21 DIAGNOSIS — R39.89 URINARY PROBLEM: Primary | ICD-10-CM

## 2022-09-21 LAB
ALBUMIN UR-MCNC: 50 MG/DL
APPEARANCE UR: ABNORMAL
BILIRUB UR QL STRIP: NEGATIVE
COLOR UR AUTO: YELLOW
GLUCOSE UR STRIP-MCNC: NEGATIVE MG/DL
HGB UR QL STRIP: ABNORMAL
KETONES UR STRIP-MCNC: NEGATIVE MG/DL
LEUKOCYTE ESTERASE UR QL STRIP: ABNORMAL
NITRATE UR QL: NEGATIVE
PH UR STRIP: 7 [PH] (ref 5–9)
RBC URINE: 17 /HPF
SP GR UR STRIP: 1.01 (ref 1–1.03)
UROBILINOGEN UR STRIP-MCNC: NORMAL MG/DL
WBC URINE: >182 /HPF
YEAST #/AREA URNS HPF: ABNORMAL /HPF

## 2022-09-21 PROCEDURE — 87086 URINE CULTURE/COLONY COUNT: CPT | Mod: ZL

## 2022-09-21 PROCEDURE — 81001 URINALYSIS AUTO W/SCOPE: CPT | Mod: ZL

## 2022-09-21 RX ORDER — CIPROFLOXACIN 250 MG/1
250 TABLET, FILM COATED ORAL 2 TIMES DAILY
Qty: 10 TABLET | Refills: 0 | Status: SHIPPED | OUTPATIENT
Start: 2022-09-21 | End: 2022-09-26

## 2022-09-21 NOTE — TELEPHONE ENCOUNTER
Patient's  dropped off UA sample for possible UTI from a F/U visit from [facility]..  New orders needed for UA.   Orders pended.    Rosalba Jamil LPN on 9/21/2022 at 3:51 PM

## 2022-09-22 ENCOUNTER — TELEPHONE (OUTPATIENT)
Dept: INTERNAL MEDICINE | Facility: OTHER | Age: 87
End: 2022-09-22

## 2022-09-23 LAB — BACTERIA UR CULT: NORMAL

## 2022-09-27 NOTE — ED TRIAGE NOTES
Patient states she has had ongoing back pain, and today at 1630 she felt a sudden chest compression around her and her jaw hurt.  
Patient to room for EKG.  
n/a

## 2022-10-10 ENCOUNTER — IMMUNIZATION (OUTPATIENT)
Dept: FAMILY MEDICINE | Facility: OTHER | Age: 87
End: 2022-10-10
Attending: INTERNAL MEDICINE
Payer: MEDICARE

## 2022-10-10 DIAGNOSIS — Z23 NEED FOR PROPHYLACTIC VACCINATION AND INOCULATION AGAINST INFLUENZA: ICD-10-CM

## 2022-10-10 DIAGNOSIS — Z23 HIGH PRIORITY FOR 2019-NCOV VACCINE: ICD-10-CM

## 2022-10-10 PROCEDURE — G0008 ADMIN INFLUENZA VIRUS VAC: HCPCS

## 2022-10-10 PROCEDURE — 91312 COVID-19,PF,PFIZER BOOSTER BIVALENT: CPT

## 2022-10-10 PROCEDURE — 0124A COVID-19,PF,PFIZER BOOSTER BIVALENT: CPT

## 2022-10-14 ENCOUNTER — OFFICE VISIT (OUTPATIENT)
Dept: FAMILY MEDICINE | Facility: OTHER | Age: 87
End: 2022-10-14
Attending: FAMILY MEDICINE
Payer: MEDICARE

## 2022-10-14 ENCOUNTER — LAB (OUTPATIENT)
Dept: LAB | Facility: OTHER | Age: 87
End: 2022-10-14
Attending: FAMILY MEDICINE
Payer: COMMERCIAL

## 2022-10-14 VITALS
RESPIRATION RATE: 16 BRPM | SYSTOLIC BLOOD PRESSURE: 138 MMHG | HEART RATE: 90 BPM | OXYGEN SATURATION: 95 % | WEIGHT: 167 LBS | DIASTOLIC BLOOD PRESSURE: 70 MMHG | TEMPERATURE: 97.6 F | BODY MASS INDEX: 31.04 KG/M2

## 2022-10-14 DIAGNOSIS — R79.89 LOW VITAMIN B12 LEVEL: ICD-10-CM

## 2022-10-14 DIAGNOSIS — E11.8 TYPE 2 DIABETES MELLITUS WITH COMPLICATION, WITHOUT LONG-TERM CURRENT USE OF INSULIN (H): ICD-10-CM

## 2022-10-14 DIAGNOSIS — N93.9 VAGINAL SPOTTING: Primary | ICD-10-CM

## 2022-10-14 DIAGNOSIS — K21.9 GASTROESOPHAGEAL REFLUX DISEASE WITHOUT ESOPHAGITIS: ICD-10-CM

## 2022-10-14 DIAGNOSIS — N93.9 VAGINAL SPOTTING: ICD-10-CM

## 2022-10-14 LAB
ALBUMIN UR-MCNC: 10 MG/DL
APPEARANCE UR: CLEAR
BILIRUB UR QL STRIP: NEGATIVE
COLOR UR AUTO: YELLOW
GLUCOSE UR STRIP-MCNC: NEGATIVE MG/DL
HGB UR QL STRIP: NEGATIVE
KETONES UR STRIP-MCNC: NEGATIVE MG/DL
LEUKOCYTE ESTERASE UR QL STRIP: ABNORMAL
MUCOUS THREADS #/AREA URNS LPF: PRESENT /LPF
NITRATE UR QL: NEGATIVE
PH UR STRIP: 5.5 [PH] (ref 5–9)
RBC URINE: 2 /HPF
SP GR UR STRIP: 1.02 (ref 1–1.03)
SQUAMOUS EPITHELIAL: 12 /HPF
UROBILINOGEN UR STRIP-MCNC: NORMAL MG/DL
WBC URINE: 65 /HPF

## 2022-10-14 PROCEDURE — 81001 URINALYSIS AUTO W/SCOPE: CPT | Mod: ZL

## 2022-10-14 PROCEDURE — 87086 URINE CULTURE/COLONY COUNT: CPT | Mod: ZL

## 2022-10-14 PROCEDURE — 99213 OFFICE O/P EST LOW 20 MIN: CPT | Performed by: FAMILY MEDICINE

## 2022-10-14 PROCEDURE — G0463 HOSPITAL OUTPT CLINIC VISIT: HCPCS

## 2022-10-14 RX ORDER — LANOLIN ALCOHOL/MO/W.PET/CERES
1000 CREAM (GRAM) TOPICAL DAILY
Qty: 90 TABLET | Refills: 3 | Status: SHIPPED | OUTPATIENT
Start: 2022-10-14 | End: 2023-11-21

## 2022-10-14 RX ORDER — FAMOTIDINE 20 MG/1
TABLET, FILM COATED ORAL
Qty: 180 TABLET | Refills: 3 | Status: SHIPPED | OUTPATIENT
Start: 2022-10-14 | End: 2023-11-21

## 2022-10-14 ASSESSMENT — ANXIETY QUESTIONNAIRES
1. FEELING NERVOUS, ANXIOUS, OR ON EDGE: NOT AT ALL
8. IF YOU CHECKED OFF ANY PROBLEMS, HOW DIFFICULT HAVE THESE MADE IT FOR YOU TO DO YOUR WORK, TAKE CARE OF THINGS AT HOME, OR GET ALONG WITH OTHER PEOPLE?: NOT DIFFICULT AT ALL
7. FEELING AFRAID AS IF SOMETHING AWFUL MIGHT HAPPEN: NOT AT ALL
3. WORRYING TOO MUCH ABOUT DIFFERENT THINGS: NOT AT ALL
7. FEELING AFRAID AS IF SOMETHING AWFUL MIGHT HAPPEN: NOT AT ALL
GAD7 TOTAL SCORE: 0
GAD7 TOTAL SCORE: 0
IF YOU CHECKED OFF ANY PROBLEMS ON THIS QUESTIONNAIRE, HOW DIFFICULT HAVE THESE PROBLEMS MADE IT FOR YOU TO DO YOUR WORK, TAKE CARE OF THINGS AT HOME, OR GET ALONG WITH OTHER PEOPLE: NOT DIFFICULT AT ALL
5. BEING SO RESTLESS THAT IT IS HARD TO SIT STILL: NOT AT ALL
4. TROUBLE RELAXING: NOT AT ALL
2. NOT BEING ABLE TO STOP OR CONTROL WORRYING: NOT AT ALL
6. BECOMING EASILY ANNOYED OR IRRITABLE: NOT AT ALL
GAD7 TOTAL SCORE: 0

## 2022-10-14 ASSESSMENT — PATIENT HEALTH QUESTIONNAIRE - PHQ9
SUM OF ALL RESPONSES TO PHQ QUESTIONS 1-9: 0
SUM OF ALL RESPONSES TO PHQ QUESTIONS 1-9: 0
10. IF YOU CHECKED OFF ANY PROBLEMS, HOW DIFFICULT HAVE THESE PROBLEMS MADE IT FOR YOU TO DO YOUR WORK, TAKE CARE OF THINGS AT HOME, OR GET ALONG WITH OTHER PEOPLE: NOT DIFFICULT AT ALL

## 2022-10-14 ASSESSMENT — PAIN SCALES - GENERAL: PAINLEVEL: NO PAIN (0)

## 2022-10-14 NOTE — PROGRESS NOTES
"  Assessment & Plan     Vaginal spotting  Repeat UA was negative.  Recommend proceeding with pelvic ultrasound.  - UA reflex to Microscopic and Culture; Future  - US Pelvis Complete without Transvaginal; Future    Gastroesophageal reflux disease without esophagitis  Continue Pepcid  - famotidine (PEPCID) 20 MG tablet; TAKE 1 TABLET(20 MG) BY MOUTH TWICE DAILY    Low vitamin B12 level  Previous vitamin B12 deficiency.  Continue on vitamin B12 1000 mcg daily.  - cyanocobalamin (VITAMIN B-12) 1000 MCG tablet; Take 1 tablet (1,000 mcg) by mouth daily      Dementia, Alzheimer's type: Gradual decline over the last few years.  Currently on Namenda 10 mg twice daily, Remeron at bedtime which has helped significantly with sleep and Paxil 10 mg daily.       BMI:   Estimated body mass index is 31.04 kg/m  as calculated from the following:    Height as of 12/30/21: 1.562 m (5' 1.5\").    Weight as of this encounter: 75.8 kg (167 lb).       Patient Instructions   Recheck urine test to make sure blood has cleared    Consider pelvic ultrasound to check size/shape uterus    Start vit b12 1000 mcg daily, sent new prescription    Sent new prescription for pepcid       No follow-ups on file.    Skye Mosley MD  [facility]    Subjective   Marluis is a 89 year old accompanied by her spouse, presenting for the following health issues:  RECHECK      HPI     89-year-old female presents for recheck after recent visit to the [facility].  She had presented with pain in the right groin.  Apparently also had a few days of spotting that was thought to be vaginal.  Urinalysis showed moderate amount of blood.  Urine culture was negative    She has had no recurrent spotting.    Memory has declined.  She is still performing all of her own ADLs including dressing and bathing.  Her short-term memory is minimal.          Review of Systems   Constitutional, HEENT, cardiovascular, pulmonary, gi and gu systems are negative, " except as otherwise noted.      Objective    /70   Pulse 90   Temp 97.6  F (36.4  C) (Tympanic)   Resp 16   Wt 75.8 kg (167 lb)   LMP  (LMP Unknown)   SpO2 95%   BMI 31.04 kg/m    Body mass index is 31.04 kg/m .  Physical Exam  Cardiovascular:      Rate and Rhythm: Normal rate.   Pulmonary:      Effort: Pulmonary effort is normal. No respiratory distress.   Abdominal:      General: Abdomen is flat. Bowel sounds are normal. There is no distension.      Tenderness: There is no abdominal tenderness.   Neurological:      Mental Status: She is alert.   Psychiatric:      Comments: Pleasant, repeats            Results for orders placed or performed in visit on 10/14/22   UA reflex to Microscopic and Culture     Status: Abnormal    Specimen: Urine, Midstream   Result Value Ref Range    Color Urine Yellow Colorless, Straw, Light Yellow, Yellow    Appearance Urine Clear Clear    Glucose Urine Negative Negative mg/dL    Bilirubin Urine Negative Negative    Ketones Urine Negative Negative mg/dL    Specific Gravity Urine 1.022 1.000 - 1.030    Blood Urine Negative Negative    pH Urine 5.5 5.0 - 9.0    Protein Albumin Urine 10 (A) Negative mg/dL    Urobilinogen Urine Normal Normal, 2.0 mg/dL    Nitrite Urine Negative Negative    Leukocyte Esterase Urine Large (A) Negative    Mucus Urine Present (A) None Seen /LPF    RBC Urine 2 <=2 /HPF    WBC Urine 65 (H) <=5 /HPF    Squamous Epithelials Urine 12 (H) <=1 /HPF    Narrative    Urine Culture ordered based on laboratory criteria   Urine Culture     Status: None    Specimen: Urine, Midstream   Result Value Ref Range    Culture Mixed Urogenital Nevin                    Answers for HPI/ROS submitted by the patient on 10/14/2022  If you checked off any problems, how difficult have these problems made it for you to do your work, take care of things at home, or get along with other people?: Not difficult at all  PHQ9 TOTAL SCORE: 0  GENESIS 7 TOTAL SCORE: 0

## 2022-10-14 NOTE — PATIENT INSTRUCTIONS
Recheck urine test to make sure blood has cleared    Consider pelvic ultrasound to check size/shape uterus    Start vit b12 1000 mcg daily, sent new prescription    Sent new prescription for pepcid

## 2022-10-14 NOTE — NURSING NOTE
"Chief Complaint   Patient presents with     RECHECK   Patient states she is here for a routine follow up.  said she had a UTI around 2 weeks ago.    Initial /70   Pulse 90   Temp 97.6  F (36.4  C) (Tympanic)   Resp 16   Wt 75.8 kg (167 lb)   LMP  (LMP Unknown)   SpO2 95%   BMI 31.04 kg/m   Estimated body mass index is 31.04 kg/m  as calculated from the following:    Height as of 12/30/21: 1.562 m (5' 1.5\").    Weight as of this encounter: 75.8 kg (167 lb).    FOOD SECURITY SCREENING QUESTIONS:    The next two questions are to help us understand your food security.  If you are feeling you need any assistance in this area, we have resources available to support you today.    Hunger Vital Signs:  Within the past 12 months we worried whether our food would run out before we got money to buy more. Never  Within the past 12 months the food we bought just didn't last and we didn't have money to get more. Never    Advance care directive on file? yes    Medication Reconciliation: complete    Nani Rainey LPN   10/14/2022  2:12 PM  "

## 2022-10-16 LAB — BACTERIA UR CULT: NORMAL

## 2022-11-03 ENCOUNTER — HOSPITAL ENCOUNTER (OUTPATIENT)
Dept: ULTRASOUND IMAGING | Facility: OTHER | Age: 87
Discharge: HOME | End: 2022-11-03
Attending: FAMILY MEDICINE | Admitting: FAMILY MEDICINE
Payer: MEDICARE

## 2022-11-03 DIAGNOSIS — N93.9 VAGINAL SPOTTING: ICD-10-CM

## 2022-11-03 PROCEDURE — 76856 US EXAM PELVIC COMPLETE: CPT

## 2022-11-21 DIAGNOSIS — E55.9 VITAMIN D DEFICIENCY: ICD-10-CM

## 2022-11-23 RX ORDER — CHOLECALCIFEROL (VITAMIN D3) 25 MCG
TABLET ORAL
Qty: 90 TABLET | Refills: 1 | Status: SHIPPED | OUTPATIENT
Start: 2022-11-23 | End: 2023-03-11

## 2022-11-23 NOTE — TELEPHONE ENCOUNTER
[id number] - [place], [place] - [address]    Requested Prescriptions   Pending Prescriptions Disp Refills     VITAMIN D3 25 MCG (1000 UT) tablet [Pharmacy Med Name: VITAMIN D3 1,000 UNIT TABLETS] 90 tablet 1     Sig: TAKE 2 TABLETS BY MOUTH DAILY     Last Written Prescription Date:  8/4/22  Last Fill Quantity: 90,   # refills: 1  Last Office Visit: 10/14/22 with Kanu Mosley MD  Future Office visit:   none    Routing refill request to provider for review/approval because:  Unable to complete prescription refill per RN Medication Refill Policy.   Destiny Alejandro RN ....................  11/23/2022   11:46 AM    
no

## 2022-12-03 NOTE — TELEPHONE ENCOUNTER
Patient Information     Patient Name MRN Mervat Leon [id number] Female [id number]      Telephone Encounter by Shazia Martínez RN at 3/28/2017  3:45 PM     Author:  Shazia Martínez RN Service:  (none) Author Type:  NURS- Registered Nurse     Filed:  3/28/2017  3:48 PM Encounter Date:  3/28/2017 Status:  Signed     :  Shazia Martínez RN (NURS- Registered Nurse)            Redundant Refill Request refused;  Medication:venlafaxine (EFFEXOR XR) 37.5 mg Extended-Release capsule    Qty:180 capsu*  Ref:4  Start:3/6/2017   Unable to complete prescription refill per RN Medication Refill Policy.................... Shazia Martínez RN ....................  3/28/2017   3:46 PM                 Pharmacy Consult-Vancomycin Dosing  Melchro Hardwick III is a  57 y.o. male receiving vancomycin therapy.     Indication: Bacteremia, bone/joint infection, endocarditis  Consulting Provider: Dr Euceda  ID Consult: Yes    Goal AUC: 400 - 600 mg/L*hr    Current Antimicrobial Therapy  Anti-Infectives (From admission, onward)      Ordered     Dose/Rate Route Frequency Start Stop    12/02/22 1159  ceftaroline (TEFLARO) 600 mg/100 mL 0.9% NS (mbp)        Ordering Provider: Kushal Balderrama, RPH    600 mg Intravenous Every 12 Hours Scheduled 12/02/22 1500 12/09/22 2059    12/02/22 1159  vancomycin 1250 mg/250 mL 0.9% NS IVPB ([facility])        Ordering Provider: Kushal Balderrama RPH    1,250 mg  over 90 Minutes Intravenous Every 12 Hours Scheduled 12/02/22 1245 12/09/22 0859    12/01/22 0933  vancomycin 1750 mg/500 mL 0.9% NS IVPB ([facility])        Ordering Provider: Bryce Euceda MD    20 mg/kg × 88.9 kg  over 120 Minutes Intravenous Once 12/01/22 1030 12/01/22 1310    12/01/22 0919  Pharmacy to dose vancomycin        Ordering Provider: Bryce Euceda MD     Does not apply Continuous PRN 12/01/22 0919 12/29/22 0918    11/30/22 1449  ceFAZolin in dextrose (ANCEF) IVPB solution 2 g        Ordering Provider: Brain Bentley MD    2 g  over 30 Minutes Intravenous Every 8 Hours 11/30/22 1800 12/01/22 0151    11/30/22 0815  ceFAZolin in dextrose (ANCEF) IVPB solution 2 g        Ordering Provider: Brain Bentley MD    2 g  over 30 Minutes Intravenous Once 11/30/22 0817 11/30/22 1045    11/30/22 0815  vancomycin 1250 mg/250 mL 0.9% NS IVPB ([facility])        Ordering Provider: Brain Bentley MD    15 mg/kg × 88.9 kg Intravenous Once 11/30/22 0817 11/30/22 0902            Allergies  Allergies as of 11/23/2022    (No Known Allergies)       Labs    Results from last 7 days   Lab Units 12/02/22  0431 12/01/22  1509 12/01/22  0824   BUN mg/dL 13 18 16   CREATININE mg/dL 0.63* 0.70* 0.71*       Results from last 7 days   Lab Units  "12/01/22  0824 11/28/22  1256 11/26/22  0139   WBC 10*3/mm3 9.88 5.90 4.20       Evaluation of Dosing     Last Dose Received in the ED/Outside Facility: 1250mg received as ppx on 11/30 @0902  Is Patient on Dialysis or Renal Replacement: no    Ht - 177.8 cm (70\")  Wt - 88.9 kg (196 lb)    Estimated Creatinine Clearance: 145.3 mL/min (A) (by C-G formula based on SCr of 0.63 mg/dL (L)).    Intake & Output (last 3 days)         11/29 0701  11/30 0700 11/30 0701  12/01 0700 12/01 0701 12/02 0700 12/02 0701 12/03 0700    P.O.  320 757 485    I.V. (mL/kg)  1400      Total Intake(mL/kg)  1720 757 (8.5) 485 (5.5)    Urine (mL/kg/hr)  1700 2025 (0.9) 850 (1.4)    Drains  30 55     Total Output  1730 2080 850    Net  -10 -1323 -365            Urine Unmeasured Occurrence   1 x             Microbiology and Radiology  Microbiology Results (last 10 days)       Procedure Component Value - Date/Time    Tissue / Bone Culture - Tissue, Knee, Right [[id number]] Collected: 11/30/22 1151    Lab Status: Preliminary result Specimen: Tissue from Knee, Right Updated: 12/02/22 0730     Tissue Culture No growth at 2 days     Gram Stain Few (2+) WBCs seen      No organisms seen    Tissue / Bone Culture - Tissue, Knee, Right [[id number]] Collected: 11/30/22 1149    Lab Status: Preliminary result Specimen: Tissue from Knee, Right Updated: 12/02/22 0839     Tissue Culture No growth at 2 days     Gram Stain Few (2+) WBCs seen      No organisms seen    AFB Culture - Tissue, Knee, Right [[id number]] Collected: 11/30/22 1149    Lab Status: Preliminary result Specimen: Tissue from Knee, Right Updated: 12/01/22 1222     AFB Stain No acid fast bacilli seen on concentrated smear    Tissue / Bone Culture - Synovium, Knee, Right [[id number]] Collected: 11/30/22 1140    Lab Status: Preliminary result Specimen: Synovium from Knee, Right Updated: 12/02/22 0730     Tissue Culture No growth at 2 days     Gram Stain Few (2+) WBCs seen      No organisms seen    AFB " Culture - Synovium, Knee, Right [[id number]] Collected: 11/30/22 1140    Lab Status: Preliminary result Specimen: Synovium from Knee, Right Updated: 12/01/22 1221     AFB Stain No acid fast bacilli seen on concentrated smear    MRSA Screen, PCR (Inpatient) - Swab, Nares [[id number]]  (Normal) Collected: 11/28/22 1256    Lab Status: Final result Specimen: Swab from Nares Updated: 11/28/22 1516     MRSA PCR Negative    Narrative:      The negative predictive value of this diagnostic test is high and should only be used to consider de-escalating anti-MRSA therapy. A positive result may indicate colonization with MRSA and must be correlated clinically.  MRSA Negative    Blood Culture - Blood, Arm, Right [[id number]]  (Normal) Collected: 11/23/22 1054    Lab Status: Final result Specimen: Blood from Arm, Right Updated: 11/28/22 1115     Blood Culture No growth at 5 days    Blood Culture - Blood, Hand, Right [[id number]]  (Normal) Collected: 11/23/22 1054    Lab Status: Final result Specimen: Blood from Hand, Right Updated: 11/28/22 1115     Blood Culture No growth at 5 days            Reported Vancomycin Levels                         InsightRX AUC Calculation:    Current AUC:   -- mg/L*hr    Predicted Steady State AUC on Current Dose: 500 mg/L*hr  _________________________________    Predicted Steady State AUC on New Dose:   -- mg/L*hr    Assessment/Plan:    Pharmacy to dose vancomycin for bacteremia. Goal AUC: 400-600 mg/L*hr.  Patient received loading dose of 1750mg on 12/1 at 1132.  Initiated maintenance dose of 1250mg every 12 hours.   Patient is afebrile and voiding today, with more than 1500ml UOP documented. UOP stable. SCr is stable at 0.57 today.  8 hour vancomycin level= 20 mcg/mL this AM, true trough likely lower. This level correlates with a predicted AUC of 500 and is within goal  Based on evaluation, continue current maintenance regimen of 1250mg every 12 hours.  Will obtain next level on Monday with AM labs.    Pharmacy will continue to monitor cultures, sensitivities, renal function, and clinical status, and will adjust regimen as necessary.      Thank you for this consult,    Aime Pepe RPH  12/3/2022  07:11 EST

## 2023-01-14 DIAGNOSIS — K21.9 GASTROESOPHAGEAL REFLUX DISEASE WITHOUT ESOPHAGITIS: ICD-10-CM

## 2023-01-18 NOTE — TELEPHONE ENCOUNTER
"Patient hasn't seen Dr. Ko since 02/22/21. Contacted patient who requested to be transferred to scheduling to set up a visit with PCP. Will route refill request to PCP.      Disp Refills Start End DANK   omeprazole (PRILOSEC) 20 MG DR capsule 90 capsule 3 1/20/2022  No   Sig: TAKE 1 CAPSULE(20 MG) BY MOUTH DAILY   Sent to pharmacy as: Omeprazole 20 MG Oral Capsule Delayed Release (priLOSEC)   Class: E-Prescribe       Last Office Visit:10/14/2022 with Dr. Mosley  Future Office visit:         Requested Prescriptions   Pending Prescriptions Disp Refills     omeprazole (PRILOSEC) 20 MG DR capsule [Pharmacy Med Name: OMEPRAZOLE 20MG CAPSULES] 90 capsule 3     Sig: TAKE 1 CAPSULE(20 MG) BY MOUTH DAILY       PPI Protocol Passed - 1/14/2023  5:22 AM        Passed - Not on Clopidogrel (unless Pantoprazole ordered)        Passed - No diagnosis of osteoporosis on record        Passed - Recent (12 mo) or future (30 days) visit within the authorizing provider's specialty     Patient has had an office visit with the authorizing provider or a provider within the authorizing providers department within the previous 12 mos or has a future within next 30 days. See \"Patient Info\" tab in inbasket, or \"Choose Columns\" in Meds & Orders section of the refill encounter.              Passed - Medication is active on med list        Passed - Patient is age 18 or older        Passed - No active pregnacy on record        Passed - No positive pregnancy test in past 12 months             Routing refill request to provider for review/approval.     Unable to complete prescription refill per RNMedication Refill Policy.................... Adrianna Rollins RN ....................  1/18/2023   8:27 AM          "

## 2023-03-07 ENCOUNTER — OFFICE VISIT (OUTPATIENT)
Dept: FAMILY MEDICINE | Facility: OTHER | Age: 88
End: 2023-03-07
Attending: FAMILY MEDICINE
Payer: MEDICARE

## 2023-03-07 VITALS
TEMPERATURE: 98.2 F | WEIGHT: 171.4 LBS | SYSTOLIC BLOOD PRESSURE: 126 MMHG | RESPIRATION RATE: 20 BRPM | BODY MASS INDEX: 32.36 KG/M2 | OXYGEN SATURATION: 95 % | DIASTOLIC BLOOD PRESSURE: 64 MMHG | HEIGHT: 61 IN | HEART RATE: 96 BPM

## 2023-03-07 DIAGNOSIS — R21 RASH AND NONSPECIFIC SKIN ERUPTION: ICD-10-CM

## 2023-03-07 DIAGNOSIS — K21.9 GASTROESOPHAGEAL REFLUX DISEASE WITHOUT ESOPHAGITIS: ICD-10-CM

## 2023-03-07 DIAGNOSIS — R21 RASH: ICD-10-CM

## 2023-03-07 DIAGNOSIS — I10 ESSENTIAL HYPERTENSION: ICD-10-CM

## 2023-03-07 DIAGNOSIS — E11.8 TYPE 2 DIABETES MELLITUS WITH COMPLICATION, WITHOUT LONG-TERM CURRENT USE OF INSULIN (H): ICD-10-CM

## 2023-03-07 DIAGNOSIS — F41.9 ANXIETY: ICD-10-CM

## 2023-03-07 DIAGNOSIS — E53.8 VITAMIN B12 DEFICIENCY (NON ANEMIC): ICD-10-CM

## 2023-03-07 DIAGNOSIS — Z00.00 MEDICARE ANNUAL WELLNESS VISIT, SUBSEQUENT: Primary | ICD-10-CM

## 2023-03-07 DIAGNOSIS — F03.90 DEMENTIA WITHOUT BEHAVIORAL DISTURBANCE (H): ICD-10-CM

## 2023-03-07 LAB
ALBUMIN SERPL BCG-MCNC: 4.1 G/DL (ref 3.5–5.2)
ALP SERPL-CCNC: 84 U/L (ref 35–104)
ALT SERPL W P-5'-P-CCNC: 14 U/L (ref 10–35)
ANION GAP SERPL CALCULATED.3IONS-SCNC: 8 MMOL/L (ref 7–15)
AST SERPL W P-5'-P-CCNC: 16 U/L (ref 10–35)
BILIRUB SERPL-MCNC: 0.3 MG/DL
BUN SERPL-MCNC: 26 MG/DL (ref 8–23)
CALCIUM SERPL-MCNC: 10 MG/DL (ref 8.2–9.6)
CHLORIDE SERPL-SCNC: 106 MMOL/L (ref 98–107)
CHOLEST SERPL-MCNC: 286 MG/DL
CREAT SERPL-MCNC: 1.24 MG/DL (ref 0.51–0.95)
DEPRECATED HCO3 PLAS-SCNC: 31 MMOL/L (ref 22–29)
GFR SERPL CREATININE-BSD FRML MDRD: 41 ML/MIN/1.73M2
GLUCOSE SERPL-MCNC: 120 MG/DL (ref 70–99)
HDLC SERPL-MCNC: 43 MG/DL
HOLD SPECIMEN: NORMAL
LDLC SERPL CALC-MCNC: 180 MG/DL
NONHDLC SERPL-MCNC: 243 MG/DL
POTASSIUM SERPL-SCNC: 4.4 MMOL/L (ref 3.4–5.3)
PROT SERPL-MCNC: 7.2 G/DL (ref 6.4–8.3)
SODIUM SERPL-SCNC: 145 MMOL/L (ref 136–145)
TRIGL SERPL-MCNC: 313 MG/DL
VIT B12 SERPL-MCNC: 1071 PG/ML (ref 232–1245)

## 2023-03-07 PROCEDURE — 82607 VITAMIN B-12: CPT | Mod: ZL | Performed by: FAMILY MEDICINE

## 2023-03-07 PROCEDURE — 36415 COLL VENOUS BLD VENIPUNCTURE: CPT | Mod: ZL | Performed by: FAMILY MEDICINE

## 2023-03-07 PROCEDURE — G0463 HOSPITAL OUTPT CLINIC VISIT: HCPCS

## 2023-03-07 PROCEDURE — 80053 COMPREHEN METABOLIC PANEL: CPT | Mod: ZL | Performed by: FAMILY MEDICINE

## 2023-03-07 PROCEDURE — 99213 OFFICE O/P EST LOW 20 MIN: CPT | Performed by: FAMILY MEDICINE

## 2023-03-07 PROCEDURE — 80061 LIPID PANEL: CPT | Mod: ZL | Performed by: FAMILY MEDICINE

## 2023-03-07 RX ORDER — PAROXETINE 10 MG/1
TABLET, FILM COATED ORAL
Qty: 90 TABLET | Refills: 3 | Status: SHIPPED | OUTPATIENT
Start: 2023-03-07 | End: 2024-04-05

## 2023-03-07 RX ORDER — TRIAMCINOLONE ACETONIDE 1 MG/ML
LOTION TOPICAL 3 TIMES DAILY
Qty: 60 ML | Refills: 2 | Status: SHIPPED | OUTPATIENT
Start: 2023-03-07

## 2023-03-07 RX ORDER — MEMANTINE HYDROCHLORIDE 10 MG/1
10 TABLET ORAL 2 TIMES DAILY
Qty: 180 TABLET | Refills: 3 | Status: SHIPPED | OUTPATIENT
Start: 2023-03-07 | End: 2024-03-19

## 2023-03-07 RX ORDER — TRIAMCINOLONE ACETONIDE 1 MG/G
CREAM TOPICAL 2 TIMES DAILY
Qty: 30 G | Refills: 1 | Status: SHIPPED | OUTPATIENT
Start: 2023-03-07 | End: 2024-07-09

## 2023-03-07 RX ORDER — MIRTAZAPINE 15 MG/1
TABLET, FILM COATED ORAL
Qty: 45 TABLET | Refills: 2 | Status: SHIPPED | OUTPATIENT
Start: 2023-03-07 | End: 2024-03-19

## 2023-03-07 RX ORDER — METOPROLOL SUCCINATE 50 MG/1
TABLET, EXTENDED RELEASE ORAL
Qty: 90 TABLET | Refills: 3 | Status: SHIPPED | OUTPATIENT
Start: 2023-03-07 | End: 2024-03-19

## 2023-03-07 ASSESSMENT — ANXIETY QUESTIONNAIRES
2. NOT BEING ABLE TO STOP OR CONTROL WORRYING: SEVERAL DAYS
3. WORRYING TOO MUCH ABOUT DIFFERENT THINGS: SEVERAL DAYS
7. FEELING AFRAID AS IF SOMETHING AWFUL MIGHT HAPPEN: NOT AT ALL
GAD7 TOTAL SCORE: 5
IF YOU CHECKED OFF ANY PROBLEMS ON THIS QUESTIONNAIRE, HOW DIFFICULT HAVE THESE PROBLEMS MADE IT FOR YOU TO DO YOUR WORK, TAKE CARE OF THINGS AT HOME, OR GET ALONG WITH OTHER PEOPLE: NOT DIFFICULT AT ALL
GAD7 TOTAL SCORE: 5
7. FEELING AFRAID AS IF SOMETHING AWFUL MIGHT HAPPEN: NOT AT ALL
GAD7 TOTAL SCORE: 5
8. IF YOU CHECKED OFF ANY PROBLEMS, HOW DIFFICULT HAVE THESE MADE IT FOR YOU TO DO YOUR WORK, TAKE CARE OF THINGS AT HOME, OR GET ALONG WITH OTHER PEOPLE?: NOT DIFFICULT AT ALL
5. BEING SO RESTLESS THAT IT IS HARD TO SIT STILL: NOT AT ALL
4. TROUBLE RELAXING: SEVERAL DAYS
6. BECOMING EASILY ANNOYED OR IRRITABLE: SEVERAL DAYS
1. FEELING NERVOUS, ANXIOUS, OR ON EDGE: SEVERAL DAYS

## 2023-03-07 ASSESSMENT — ENCOUNTER SYMPTOMS
WEAKNESS: 0
SORE THROAT: 0
PALPITATIONS: 0
HEMATOCHEZIA: 0
HEMATURIA: 0
BREAST MASS: 0
DIARRHEA: 0
FREQUENCY: 0
MYALGIAS: 0
EYE PAIN: 0
ARTHRALGIAS: 1
NAUSEA: 0
CHILLS: 1
NERVOUS/ANXIOUS: 1
DIZZINESS: 0
HEADACHES: 0
COUGH: 0
PARESTHESIAS: 0
DYSURIA: 0
ABDOMINAL PAIN: 0
FEVER: 0
SHORTNESS OF BREATH: 1
CONSTIPATION: 0
HEARTBURN: 1

## 2023-03-07 ASSESSMENT — PATIENT HEALTH QUESTIONNAIRE - PHQ9
SUM OF ALL RESPONSES TO PHQ QUESTIONS 1-9: 6
SUM OF ALL RESPONSES TO PHQ QUESTIONS 1-9: 6
10. IF YOU CHECKED OFF ANY PROBLEMS, HOW DIFFICULT HAVE THESE PROBLEMS MADE IT FOR YOU TO DO YOUR WORK, TAKE CARE OF THINGS AT HOME, OR GET ALONG WITH OTHER PEOPLE: NOT DIFFICULT AT ALL

## 2023-03-07 ASSESSMENT — ACTIVITIES OF DAILY LIVING (ADL): CURRENT_FUNCTION: NO ASSISTANCE NEEDED

## 2023-03-07 ASSESSMENT — PAIN SCALES - GENERAL: PAINLEVEL: MILD PAIN (3)

## 2023-03-07 NOTE — PROGRESS NOTES
"Review current opioid prescriptions    For a patient with a current opioid prescription:    Reviewed potential Opioid Use Disorder (OUD) risk factors: Yes     Evaluate their pain severity and current treatment plan:     Provide information on non-opioid treatment options:    Refer to a specialist, as appropriate:    Get more information on pain management in the HHS Pain Management Best Practices Inter-agency Task Force Report    Screen for potential Substance Use Disorders (SUDs)    Reviewed the patient s potential risk factors for SUDs: Yes     Refer to treatment or specialist, as appropriate:     A screening tool isn t required but you may use one:  Find more information in the National Jet on Drug Abuse Screening and Assessment Tools Chart  SUBJECTIVE:   Jose Martin is a 89 year old who presents for Preventive Visit.    89 yo female presents for medicare wellness.    Short-term memory continues to decline.  She is still performing most of her ADLs.  She dressed days and feeds herself.  She is just \"forgetful\".   manages her medications and finances.  She no longer drives.    Episode of vomiting last night.  She has these episodes a few times a month.   typically gives her dose of Zofran and it seems to work.  Suspect there is a component of regurgitation as well.    Patient has been advised of split billing requirements and indicates understanding: Yes  Are you in the first 12 months of your Medicare coverage?  No    Healthy Habits:     In general, how would you rate your overall health?  Fair    Frequency of exercise:  None    Do you usually eat at least 4 servings of fruit and vegetables a day, include whole grains    & fiber and avoid regularly eating high fat or \"junk\" foods?  No    Taking medications regularly:  Yes    Medication side effects:  None    Ability to successfully perform activities of daily living:  No assistance needed    Home Safety:  No safety concerns identified    Hearing " Impairment:  No hearing concerns    In the past 6 months, have you been bothered by leaking of urine?  No    In general, how would you rate your overall mental or emotional health?  Fair      PHQ-2 Total Score: 2    Additional concerns today:  Yes      Have you ever done Advance Care Planning? (For example, a Health Directive, POLST, or a discussion with a medical provider or your loved ones about your wishes): Yes, advance care planning is on file.       Fall risk  Fallen 2 or more times in the past year?: No  Any fall with injury in the past year?: No    Cognitive Screening   1) Repeat 3 items (Leader, Season, Table)    2) Clock draw: ABNORMAL time   3) 3 item recall: Recalls NO objects   Results: 0 items recalled: PROBABLE COGNITIVE IMPAIRMENT, **INFORM PROVIDER**    Mini-CogTM Copyright S Joon. Licensed by the author for use in [facility]; reprinted with permission (jesus@[facility]). All rights reserved.      Do you have sleep apnea, excessive snoring or daytime drowsiness?: no    Reviewed and updated as needed this visit by clinical staff   Tobacco  Allergies  Meds      Soc Hx        Reviewed and updated as needed this visit by Provider                 Social History     Tobacco Use     Smoking status: Former     Smokeless tobacco: Never   Substance Use Topics     Alcohol use: Yes     Alcohol/week: 2.0 standard drinks     Comment: Alcoholic Drinks/day: 1 a night         Alcohol Use 3/7/2023   Prescreen: >3 drinks/day or >7 drinks/week? No               Current providers sharing in care for this patient include:   Patient Care Team:  Skye Colon MD as PCP - General (Family Medicine)  Guru Misha Laboy MD as MD (Gastroenterology)  Skye Colon MD as Assigned PCP    The following health maintenance items are reviewed in Epic and correct as of today:  Health Maintenance   Topic Date Due     LIPID  Never done     MICROALBUMIN  Never done      DIABETIC FOOT EXAM  Never done     EYE EXAM  Never done     ZOSTER IMMUNIZATION (1 of 2) Never done     MEDICARE ANNUAL WELLNESS VISIT  Never done     A1C  01/27/2021     BMP  07/27/2021     DTAP/TDAP/TD IMMUNIZATION (3 - Td or Tdap) 03/09/2022     FALL RISK ASSESSMENT  03/07/2024     ADVANCE CARE PLANNING  05/22/2024     PHQ-2 (once per calendar year)  Completed     INFLUENZA VACCINE  Completed     Pneumococcal Vaccine: 65+ Years  Completed     COVID-19 Vaccine  Completed     IPV IMMUNIZATION  Aged Out     MENINGITIS IMMUNIZATION  Aged Out     BP Readings from Last 3 Encounters:   03/07/23 126/64   10/14/22 138/70   08/27/22 (!) 152/84    Wt Readings from Last 3 Encounters:   03/07/23 77.7 kg (171 lb 6.4 oz)   10/14/22 75.8 kg (167 lb)   08/27/22 76.2 kg (168 lb 1.6 oz)                  Patient Active Problem List   Diagnosis     Hypercholesterolemia     Hypertension     Iron deficiency anemia     Spinal stenosis, lumbar     Osteoarthrosis, unspecified whether generalized or localized, pelvic region and thigh     Pancreatic lesion     Gastroesophageal reflux disease without esophagitis     Folic acid deficiency     Type 2 diabetes mellitus with complication, without long-term current use of insulin (H)     Dementia without behavioral disturbance (H)     Hiatal hernia     Past Surgical History:   Procedure Laterality Date     ARTHROPLASTY HIP      3/9/09,Left, by Ashish Ivory M.D.     BLEPHAROPLASTY  04/18/2016    by Dr White Left brow lift     CERVICAL POLYPECTOMY      Endocervical polyps     CYSTOCELE REPAIR      Uterine prolapse and vaginal vault prolapse with cystocele     EXTRACAPSULAR CATARACT EXTRATION WITH INTRAOCULAR LENS IMPLANT Bilateral 2010     HIP SURGERY Left 2012    Times 2     HIP SURGERY Right      HYSTERECTOMY TOTAL ABDOMINAL      Rectocele     OPEN REDUCTION INTERNAL FIXATION WRIST Right 04/21/2017    Right,Synthes stainless steel variable angle volar radial plate with T8 screw heads.      RELEASE CARPAL TUNNEL Right 2018    Procedure: RELEASE CARPAL TUNNEL;  Right Open Carpal Tunnel Release;  Surgeon: Gerald Martínez DO;  Location: [facility] OR     SALPINGO-OOPHORECTOMY BILATERAL      89,Bilateral salpingo oophorectomy and vaginal vault suspension       Social History     Tobacco Use     Smoking status: Former     Smokeless tobacco: Never   Substance Use Topics     Alcohol use: Yes     Alcohol/week: 2.0 standard drinks     Comment: Alcoholic Drinks/day: 1 a night     Family History   Problem Relation Age of Onset     Other - See Comments Mother          of pneumonia     Heart Disease Father         CHF     Thyroid Disease Daughter         Thyroid Disease,Hypothyroid.     Blood Disease Daughter         Blood Disease,severe anemia with a hgb of less than 5     Family History Negative Son         Good Health     Family History Negative Daughter         Good Health     Thyroid Disease Daughter         Thyroid Disease,Hypothyroid.     Arthritis Other         Arthritis,Father's side has rheumatoid arthritis         Pneumonia Vaccine:utd  Mammogram Screening: Mammogram Screening - Patient over age 75, has elected to discontinue screenings.      Pertinent mammograms are reviewed under the imaging tab.    Review of Systems   Constitutional: Positive for chills. Negative for fever.   HENT: Negative for congestion, ear pain, hearing loss and sore throat.    Eyes: Negative for pain and visual disturbance.   Respiratory: Positive for shortness of breath. Negative for cough.    Cardiovascular: Negative for chest pain, palpitations and peripheral edema.   Gastrointestinal: Positive for heartburn. Negative for abdominal pain, constipation, diarrhea, hematochezia and nausea.   Breasts:  Negative for tenderness, breast mass and discharge.   Genitourinary: Negative for dysuria, frequency, genital sores, hematuria, pelvic pain, urgency, vaginal bleeding and vaginal discharge.   Musculoskeletal: Positive for  "arthralgias. Negative for myalgias.   Skin: Negative for rash.   Neurological: Negative for dizziness, weakness, headaches and paresthesias.   Psychiatric/Behavioral: Positive for mood changes. The patient is nervous/anxious.          OBJECTIVE:   /64   Pulse 96   Temp 98.2  F (36.8  C) (Tympanic)   Resp 20   Ht 1.549 m (5' 1\")   Wt 77.7 kg (171 lb 6.4 oz)   LMP  (LMP Unknown)   SpO2 95%   Breastfeeding No   BMI 32.39 kg/m   Estimated body mass index is 32.39 kg/m  as calculated from the following:    Height as of this encounter: 1.549 m (5' 1\").    Weight as of this encounter: 77.7 kg (171 lb 6.4 oz).  Physical Exam  HENT:      Head: Normocephalic.      Right Ear: Tympanic membrane normal.      Left Ear: Tympanic membrane normal.   Cardiovascular:      Rate and Rhythm: Normal rate and regular rhythm.      Heart sounds: No murmur heard.  Pulmonary:      Effort: Pulmonary effort is normal. No respiratory distress.   Musculoskeletal:         General: No swelling. Normal range of motion.      Cervical back: Normal range of motion.   Skin:     General: Skin is warm.   Neurological:      Mental Status: She is alert.   Psychiatric:      Comments: Pleasant.  Frequent repeating of the same question.           Diagnostic Test Results:  Labs reviewed in Epic  Results for orders placed or performed in visit on 03/07/23   Vitamin B12     Status: Normal   Result Value Ref Range    Vitamin B12 1,071 232 - 1,245 pg/mL   Lipid Panel     Status: Abnormal   Result Value Ref Range    Cholesterol 286 (H) <200 mg/dL    Triglycerides 313 (H) <150 mg/dL    Direct Measure HDL 43 (L) >=50 mg/dL    LDL Cholesterol Calculated 180 (H) <=100 mg/dL    Non HDL Cholesterol 243 (H) <130 mg/dL    Narrative    Cholesterol  Desirable:  <200 mg/dL    Triglycerides  Normal:  Less than 150 mg/dL  Borderline High:  150-199 mg/dL  High:  200-499 mg/dL  Very High:  Greater than or equal to 500 mg/dL    Direct Measure HDL  Female:  Greater " than or equal to 50 mg/dL   Male:  Greater than or equal to 40 mg/dL    LDL Cholesterol  Desirable:  <100mg/dL  Above Desirable:  100-129 mg/dL   Borderline High:  130-159 mg/dL   High:  160-189 mg/dL   Very High:  >= 190 mg/dL    Non HDL Cholesterol  Desirable:  130 mg/dL  Above Desirable:  130-159 mg/dL  Borderline High:  160-189 mg/dL  High:  190-219 mg/dL  Very High:  Greater than or equal to 220 mg/dL   Comprehensive Metabolic Panel     Status: Abnormal   Result Value Ref Range    Sodium 145 136 - 145 mmol/L    Potassium 4.4 3.4 - 5.3 mmol/L    Chloride 106 98 - 107 mmol/L    Carbon Dioxide (CO2) 31 (H) 22 - 29 mmol/L    Anion Gap 8 7 - 15 mmol/L    Urea Nitrogen 26.0 (H) 8.0 - 23.0 mg/dL    Creatinine 1.24 (H) 0.51 - 0.95 mg/dL    Calcium 10.0 (H) 8.2 - 9.6 mg/dL    Glucose 120 (H) 70 - 99 mg/dL    Alkaline Phosphatase 84 35 - 104 U/L    AST 16 10 - 35 U/L    ALT 14 10 - 35 U/L    Protein Total 7.2 6.4 - 8.3 g/dL    Albumin 4.1 3.5 - 5.2 g/dL    Bilirubin Total 0.3 <=1.2 mg/dL    GFR Estimate 41 (L) >60 mL/min/1.73m2   Extra Tube     Status: None    Narrative    The following orders were created for panel order Extra Tube.  Procedure                               Abnormality         Status                     ---------                               -----------         ------                     Extra Purple Top Tube[[id number]]                            Final result                 Please view results for these tests on the individual orders.   Extra Purple Top Tube     Status: None   Result Value Ref Range    Hold Specimen x        ASSESSMENT / PLAN:       ICD-10-CM    1. Medicare annual wellness visit, subsequent  Z00.00       2. Essential hypertension  I10 metoprolol succinate ER (TOPROL XL) 50 MG 24 hr tablet      3. Anxiety  F41.9 mirtazapine (REMERON) 15 MG tablet     PARoxetine (PAXIL) 10 MG tablet      4. Gastroesophageal reflux disease without esophagitis  K21.9 omeprazole (PRILOSEC) 20 MG DR capsule  "     5. Rash  R21 triamcinolone (KENALOG) 0.1 % external cream      6. Rash and nonspecific skin eruption  R21 triamcinolone (KENALOG) 0.1 % external lotion      7. Type 2 diabetes mellitus with complication, without long-term current use of insulin (H)  E11.8 Lipid Panel     Comprehensive Metabolic Panel     FOOT EXAM     Lipid Panel     Comprehensive Metabolic Panel      8. Dementia without behavioral disturbance (H)  F03.90       9. Vitamin B12 deficiency (non anemic)  E53.8 Vitamin B12     Vitamin B12        Reviewed preventive cares.  She is up-to-date.  She will plan to get a COVID and flu vaccine again in the fall.  Foot exam was performed today and is normal.  Repeat vitamin B12 level is within normal limits and she will continue on her current vitamin B12 supplement.  Memory continues to decline but she is performing most of her ADLs on her own.   is supportive.  Recommend updating advance care directive.  Discussed increasing Remeron to help with sleep.    Patient Instructions   May try voltaren gel on back  Start tylenol 650 mg at bedtime x 2 weeks, so if this helps with sleep/pain  Could also consider increasing remeron to 1 full tablet at bedtime    Lab tests      Patient has been advised of split billing requirements and indicates understanding: Yes      COUNSELING:  Reviewed preventive health counseling, as reflected in patient instructions  Special attention given to:       Regular exercise       Healthy diet/nutrition       Vision screening       Hearing screening       Dental care       Bladder control       Fall risk prevention       Folic Acid       Osteoporosis prevention/bone health       Hepatitis C screening       Advanced Planning       BMI:   Estimated body mass index is 32.39 kg/m  as calculated from the following:    Height as of this encounter: 1.549 m (5' 1\").    Weight as of this encounter: 77.7 kg (171 lb 6.4 oz).   Weight management plan: Discussed healthy diet and exercise " guidelines      She reports that she has quit smoking. She has never used smokeless tobacco.      Appropriate preventive services were discussed with this patient, including applicable screening as appropriate for cardiovascular disease, diabetes, osteopenia/osteoporosis, and glaucoma.  As appropriate for age/gender, discussed screening for colorectal cancer, prostate cancer, breast cancer, and cervical cancer. Checklist reviewing preventive services available has been given to the patient.    Reviewed patients plan of care and provided an AVS. The Basic Care Plan (routine screening as documented in Health Maintenance) for Mervat meets the Care Plan requirement. This Care Plan has been established and reviewed with the Patient.      Skye Mosley MD  [facility]    Identified Health Risks:  Answers for HPI/ROS submitted by the patient on 3/7/2023  If you checked off any problems, how difficult have these problems made it for you to do your work, take care of things at home, or get along with other people?: Not difficult at all  PHQ9 TOTAL SCORE: 6  GENESIS 7 TOTAL SCORE: 5

## 2023-03-07 NOTE — NURSING NOTE
Patient is here with  for medicare wellness.     No LMP recorded (lmp unknown). Patient has had a hysterectomy.  Medication Reconciliation: complete      Janice Fiore LPN 3/7/2023 2:00 PM       Advance care directive on file? yes  Advance care directive provided to patient? na       Janice Fiore LPN

## 2023-03-07 NOTE — PATIENT INSTRUCTIONS
May try voltaren gel on back  Start tylenol 650 mg at bedtime x 2 weeks, so if this helps with sleep/pain  Could also consider increasing remeron to 1 full tablet at bedtime    Lab tests

## 2023-03-09 DIAGNOSIS — E55.9 VITAMIN D DEFICIENCY: ICD-10-CM

## 2023-03-11 DIAGNOSIS — E55.9 VITAMIN D DEFICIENCY: ICD-10-CM

## 2023-03-11 RX ORDER — CHOLECALCIFEROL (VITAMIN D3) 25 MCG
TABLET ORAL
Qty: 90 TABLET | Refills: 1 | Status: SHIPPED | OUTPATIENT
Start: 2023-03-11 | End: 2023-03-11

## 2023-03-11 RX ORDER — VITAMIN B COMPLEX
2 TABLET ORAL DAILY
Qty: 90 TABLET | Refills: 4 | Status: SHIPPED | OUTPATIENT
Start: 2023-03-11 | End: 2023-03-12

## 2023-03-12 DIAGNOSIS — E55.9 VITAMIN D DEFICIENCY: ICD-10-CM

## 2023-03-12 RX ORDER — VITAMIN B COMPLEX
2 TABLET ORAL DAILY
Qty: 90 TABLET | Refills: 4 | Status: SHIPPED | OUTPATIENT
Start: 2023-03-12 | End: 2024-03-18

## 2023-10-10 ENCOUNTER — ALLIED HEALTH/NURSE VISIT (OUTPATIENT)
Dept: FAMILY MEDICINE | Facility: OTHER | Age: 88
End: 2023-10-10
Attending: FAMILY MEDICINE
Payer: MEDICARE

## 2023-10-10 DIAGNOSIS — Z23 NEED FOR PROPHYLACTIC VACCINATION AND INOCULATION AGAINST INFLUENZA: Primary | ICD-10-CM

## 2023-10-10 DIAGNOSIS — Z23 HIGH PRIORITY FOR 2019-NCOV VACCINE: ICD-10-CM

## 2023-10-10 PROCEDURE — G0008 ADMIN INFLUENZA VIRUS VAC: HCPCS

## 2023-10-10 PROCEDURE — 91320 SARSCV2 VAC 30MCG TRS-SUC IM: CPT

## 2023-11-17 DIAGNOSIS — K21.9 GASTROESOPHAGEAL REFLUX DISEASE WITHOUT ESOPHAGITIS: ICD-10-CM

## 2023-11-17 DIAGNOSIS — R79.89 LOW VITAMIN B12 LEVEL: ICD-10-CM

## 2023-11-21 RX ORDER — LANOLIN ALCOHOL/MO/W.PET/CERES
1000 CREAM (GRAM) TOPICAL DAILY
Qty: 90 TABLET | Refills: 1 | Status: SHIPPED | OUTPATIENT
Start: 2023-11-21 | End: 2024-06-28

## 2023-11-21 RX ORDER — FAMOTIDINE 20 MG/1
TABLET, FILM COATED ORAL
Qty: 180 TABLET | Refills: 2 | Status: SHIPPED | OUTPATIENT
Start: 2023-11-21 | End: 2024-07-09

## 2023-11-21 NOTE — TELEPHONE ENCOUNTER
Last Prescription Date: 10/14/2022  Last Qty/Refills: 180 / R-3  Last Office Visit: 03/07/2023  Future Office Visit: None     Requested Prescriptions   Pending Prescriptions Disp Refills    famotidine (PEPCID) 20 MG tablet [Pharmacy Med Name: FAMOTIDINE 20MG TABLETS] 180 tablet 3     Sig: TAKE 1 TABLET(20 MG) BY MOUTH TWICE DAILY       H2 Blockers Protocol Passed - 11/21/2023  8:23 AM     Last Prescription Date: 10/14/2022  Last Qty/Refills: 90 / R-3  Last Office Visit: 03/07/2023  Future Office Visit: None      cyanocobalamin (VITAMIN B-12) 1000 MCG tablet [Pharmacy Med Name: VITAMIN B-12 1000MCG TABLETS N/B] 90 tablet 3     Sig: TAKE 1 TABLET(1000 MCG) BY MOUTH DAILY       Vitamin Supplements (Adult) Protocol Passed - 11/21/2023  8:23 AM       Prescription approved per [facility] Refill Protocol.      Allison Mullins RN on 11/21/2023 at 8:30 AM

## 2024-03-14 DIAGNOSIS — F03.90 DEMENTIA WITHOUT BEHAVIORAL DISTURBANCE (H): Primary | ICD-10-CM

## 2024-03-14 DIAGNOSIS — I10 ESSENTIAL HYPERTENSION: ICD-10-CM

## 2024-03-14 DIAGNOSIS — F41.9 ANXIETY: ICD-10-CM

## 2024-03-18 DIAGNOSIS — E55.9 VITAMIN D DEFICIENCY: ICD-10-CM

## 2024-03-18 NOTE — LETTER
[facility]  [address]  [place] [id number] [phone]      March 20, 2024    Mervat Lockett  [address] [facility] [address]    Dear Mervat Lockett,     We recently received a refill request(s) prescribed by Skye Colon.      We have refilled your medication for one time only.    In order to get any additional refills, call our scheduling center at [phone]  to request a visit with your primary care provider for an annual physical.    Thank you,         Grand Jenni Armstrong RN

## 2024-03-19 RX ORDER — MIRTAZAPINE 15 MG/1
TABLET, FILM COATED ORAL
Qty: 45 TABLET | Refills: 0 | Status: SHIPPED | OUTPATIENT
Start: 2024-03-19 | End: 2024-03-25

## 2024-03-19 RX ORDER — METOPROLOL SUCCINATE 50 MG/1
TABLET, EXTENDED RELEASE ORAL
Qty: 90 TABLET | Refills: 0 | Status: SHIPPED | OUTPATIENT
Start: 2024-03-19 | End: 2024-03-25

## 2024-03-19 RX ORDER — MEMANTINE HYDROCHLORIDE 10 MG/1
10 TABLET ORAL 2 TIMES DAILY
Qty: 180 TABLET | Refills: 0 | Status: SHIPPED | OUTPATIENT
Start: 2024-03-19 | End: 2024-03-25

## 2024-03-19 NOTE — TELEPHONE ENCOUNTER
[facility] sent Rx request for the following:      Requested Prescriptions   Pending Prescriptions Disp Refills    memantine (NAMENDA) 10 MG tablet [Pharmacy Med Name: MEMANTINE 10MG TABLETS] 180 tablet 3     Sig: TAKE 1 TABLET(10 MG) BY MOUTH TWICE DAILY   Last Prescription Date:   3/7/23  Last Fill Qty/Refills:         180, R-3      Miscellaneous Dementia Agents Failed - 3/19/2024  4:35 PM        Failed - Recent (12 mo) or future (30 days) visit within the authorizing provider's specialty       mirtazapine (REMERON) 15 MG tablet [Pharmacy Med Name: MIRTAZAPINE 15MG TABLETS] 45 tablet 2     Sig: TAKE 1/2 TABLET BY MOUTH AT BEDTIME   Last Prescription Date:   3/7/23  Last Fill Qty/Refills:         45, R-2      Atypical Antidepressants Protocol Failed - 3/19/2024  4:35 PM        Failed - Recent (12 mo) or future (30 days) visit within the authorizing provider's specialty       metoprolol succinate ER (TOPROL XL) 50 MG 24 hr tablet [Pharmacy Med Name: METOPROLOL ER SUCCINATE 50MG TABS] 90 tablet 3     Sig: TAKE 1/2 TABLET(25 MG) BY MOUTH TWICE DAILY   Last Prescription Date:   3/7/23  Last Fill Qty/Refills:         90, R-3      Beta-Blockers Protocol Failed - 3/19/2024  4:35 PM        Failed - Blood pressure under 140/90 in past 12 months     BP Readings from Last 3 Encounters:   03/07/23 126/64   10/14/22 138/70   08/27/22 (!) 152/84           Failed - Recent (12 mo) or future (90 days) visit within the authorizing provider's specialty     Last Office Visit:              3/7/23   Future Office visit:           None    Pt due for annual exam. Routing to provider for refill consideration. Routing to Unit scheduling pool, to assist Pt in scheduling appointment.     Unable to complete prescription refill per RN Medication Refill Policy.     Routing to covering provider for refill consideration, as PCP/provider is out of clinic >48 hours or Pt is completely out of medication and provider is out of  the clinic today.    Kristen Jackson RN .............. 3/19/2024  4:38 PM

## 2024-03-20 RX ORDER — VITAMIN B COMPLEX
2 TABLET ORAL DAILY
Qty: 90 TABLET | Refills: 0 | Status: SHIPPED | OUTPATIENT
Start: 2024-03-20 | End: 2024-03-25

## 2024-03-20 NOTE — TELEPHONE ENCOUNTER
Requested Prescriptions   Pending Prescriptions Disp Refills    vitamin D3 25 mcg (1000 units) tablet 90 tablet 4     Sig: Take 2 tablets (50 mcg) by mouth daily       Vitamin Supplements Protocol Failed - 3/18/2024  4:00 PM        Failed - Recent (12 mo) or future (90 days) visit within the authorizing provider's specialty     The patient must have completed an in-person or virtual visit within the past 12 months or has a future visit scheduled within the next 90 days with the authorizing provider s specialty.  Urgent care and e-visits do not quality as an office visit for this protocol.          Passed - The patient does not have an order for high-dose vitamin D        Passed - Medication is active on med list        Passed - Medication indicated for associated diagnosis     The medication is prescribed for one or more of the following conditions:     Vitamin deficiency            Passed - The patient is 2 years of age or older          LOV-3/7/23  Reminder letter sent and will refill for 90 days only.  Prescription refilled per RN Medication RefillPolicy.................... Angeles Quigley RN ....................  3/20/2024   5:15 PM

## 2024-03-25 DIAGNOSIS — I10 ESSENTIAL HYPERTENSION: ICD-10-CM

## 2024-03-25 DIAGNOSIS — E55.9 VITAMIN D DEFICIENCY: ICD-10-CM

## 2024-03-25 DIAGNOSIS — F41.9 ANXIETY: ICD-10-CM

## 2024-03-25 DIAGNOSIS — F03.90 DEMENTIA WITHOUT BEHAVIORAL DISTURBANCE (H): ICD-10-CM

## 2024-03-25 RX ORDER — VITAMIN B COMPLEX
2 TABLET ORAL DAILY
Qty: 90 TABLET | Refills: 1 | Status: CANCELLED | OUTPATIENT
Start: 2024-03-25

## 2024-03-25 RX ORDER — MEMANTINE HYDROCHLORIDE 10 MG/1
10 TABLET ORAL 2 TIMES DAILY
Qty: 180 TABLET | Refills: 1 | Status: CANCELLED | OUTPATIENT
Start: 2024-03-25

## 2024-03-25 RX ORDER — MIRTAZAPINE 15 MG/1
TABLET, FILM COATED ORAL
Qty: 45 TABLET | Refills: 1 | Status: CANCELLED | OUTPATIENT
Start: 2024-03-25

## 2024-03-25 RX ORDER — MIRTAZAPINE 15 MG/1
TABLET, FILM COATED ORAL
Qty: 45 TABLET | Refills: 1 | Status: SHIPPED | OUTPATIENT
Start: 2024-03-25 | End: 2024-07-09

## 2024-03-25 RX ORDER — METOPROLOL SUCCINATE 50 MG/1
TABLET, EXTENDED RELEASE ORAL
Qty: 90 TABLET | Refills: 1 | Status: SHIPPED | OUTPATIENT
Start: 2024-03-25 | End: 2024-07-09

## 2024-03-25 RX ORDER — VITAMIN B COMPLEX
2 TABLET ORAL DAILY
Qty: 90 TABLET | Refills: 1 | Status: SHIPPED | OUTPATIENT
Start: 2024-03-25 | End: 2024-07-09

## 2024-03-25 RX ORDER — METOPROLOL SUCCINATE 50 MG/1
TABLET, EXTENDED RELEASE ORAL
Qty: 90 TABLET | Refills: 1 | Status: CANCELLED | OUTPATIENT
Start: 2024-03-25

## 2024-03-25 RX ORDER — MEMANTINE HYDROCHLORIDE 10 MG/1
10 TABLET ORAL 2 TIMES DAILY
Qty: 180 TABLET | Refills: 1 | Status: SHIPPED | OUTPATIENT
Start: 2024-03-25 | End: 2024-07-09

## 2024-03-25 NOTE — TELEPHONE ENCOUNTER
Per chart review, prescriptions were approved 3/19/24 for 90 day supply. Pt will run about 1 month short. Pending for 90 day supply with 1 refill, as new prescriptions will void old prescriptions.     Routing to covering provider for refill consideration, as PCP/provider is out of clinic >48 hours or Pt is completely out of medication and provider is out of the clinic today.    Kristen Jackson RN .............. 3/25/2024  3:10 PM

## 2024-03-25 NOTE — TELEPHONE ENCOUNTER
Reason for call: Medication or medication refill    Name of medication requested: memantine, mirtazapine, metoprolol succinate ER, Vit. D3,     How many days of medication do you have left? One week    What pharmacy do you use? [facility]    Preferred method for responding to this message: Telephone Call    Phone number patient can be reached at: Home number on file [phone] (home)    If we cannot reach you directly, may we leave a detailed response at the number you provided? Yes      Patients spouse, Angelo, called on behalf of the patient and requested refills until the patients upcoming Annual / Medicare Wellness on 07/09/2024.            Lucretia Carlton on 3/25/2024 at 3:02 PM

## 2024-04-04 DIAGNOSIS — K21.9 GASTROESOPHAGEAL REFLUX DISEASE WITHOUT ESOPHAGITIS: ICD-10-CM

## 2024-04-04 DIAGNOSIS — F41.9 ANXIETY: ICD-10-CM

## 2024-04-05 RX ORDER — PAROXETINE 10 MG/1
TABLET, FILM COATED ORAL
Qty: 90 TABLET | Refills: 0 | Status: SHIPPED | OUTPATIENT
Start: 2024-04-05 | End: 2024-07-09

## 2024-04-05 NOTE — TELEPHONE ENCOUNTER
[facility] sent Rx request for the following:      Requested Prescriptions   Pending Prescriptions Disp Refills    PARoxetine (PAXIL) 10 MG tablet [Pharmacy Med Name: PAROXETINE 10MG TABLETS] 90 tablet 3     Sig: TAKE 1 TABLET BY MOUTH EVERY EVENING(5 TO 7 PM)   Last Prescription Date:   3/7/23  Last Fill Qty/Refills:         90, R-3      SSRIs Protocol Failed - 4/5/2024 12:36 PM        Failed - Recent (12 mo) or future (30 days) visit within the authorizing provider's specialty       omeprazole (PRILOSEC) 20 MG DR capsule [Pharmacy Med Name: OMEPRAZOLE 20MG CAPSULES] 90 capsule 3     Sig: TAKE 1 CAPSULE(20 MG) BY MOUTH DAILY   Last Prescription Date:   3/7/23  Last Fill Qty/Refills:         90, R-3      PPI Protocol Failed - 4/5/2024 12:36 PM        Failed - Recent (12 mo) or future (90 days) visit within the authorizing provider's specialty     Last Office Visit:              3/7/23   Future Office visit:           7/9/24    Unable to complete prescription refill per RN Medication Refill Policy.     Kristne Jackson RN .............. 4/5/2024  12:40 PM

## 2024-06-24 DIAGNOSIS — R79.89 LOW VITAMIN B12 LEVEL: ICD-10-CM

## 2024-06-28 RX ORDER — LANOLIN ALCOHOL/MO/W.PET/CERES
1000 CREAM (GRAM) TOPICAL DAILY
Qty: 90 TABLET | Refills: 1 | Status: SHIPPED | OUTPATIENT
Start: 2024-06-28 | End: 2024-07-09

## 2024-06-28 NOTE — TELEPHONE ENCOUNTER
[facility] sent Rx request for the following:      Requested Prescriptions   Pending Prescriptions Disp Refills    cyanocobalamin (VITAMIN B-12) 1000 MCG tablet [Pharmacy Med Name: VITAMIN B-12 1000MCG TABLETS N/B] 90 tablet 1     Sig: TAKE 1 TABLET(1000 MCG) BY MOUTH DAILY       Vitamin Supplements Protocol Failed - 6/28/2024  9:39 AM        Failed - Medication indicated for associated diagnosis     The medication is prescribed for one or more of the following conditions:     Vitamin deficiency           Last Prescription Date:   11/21/23  Last Fill Qty/Refills:         90, R-1    Last Office Visit:              3/7/23   Future Office visit:           7/9/24    Routing refill request to provider for review/approval because:  Patient needs to be seen because it has been more than 1 year since last office visit.      Judy Piedra RN on 6/28/2024 at 9:41 AM

## 2024-07-09 ENCOUNTER — OFFICE VISIT (OUTPATIENT)
Dept: FAMILY MEDICINE | Facility: OTHER | Age: 89
End: 2024-07-09
Attending: FAMILY MEDICINE
Payer: COMMERCIAL

## 2024-07-09 VITALS
HEIGHT: 61 IN | DIASTOLIC BLOOD PRESSURE: 70 MMHG | OXYGEN SATURATION: 97 % | WEIGHT: 160.8 LBS | SYSTOLIC BLOOD PRESSURE: 164 MMHG | HEART RATE: 87 BPM | TEMPERATURE: 98.1 F | RESPIRATION RATE: 20 BRPM | BODY MASS INDEX: 30.36 KG/M2

## 2024-07-09 DIAGNOSIS — R79.89 LOW VITAMIN B12 LEVEL: ICD-10-CM

## 2024-07-09 DIAGNOSIS — E78.5 DYSLIPIDEMIA: ICD-10-CM

## 2024-07-09 DIAGNOSIS — F03.90 DEMENTIA WITHOUT BEHAVIORAL DISTURBANCE (H): ICD-10-CM

## 2024-07-09 DIAGNOSIS — E55.9 VITAMIN D DEFICIENCY: ICD-10-CM

## 2024-07-09 DIAGNOSIS — Z00.00 MEDICARE ANNUAL WELLNESS VISIT, SUBSEQUENT: Primary | ICD-10-CM

## 2024-07-09 DIAGNOSIS — F41.9 ANXIETY: ICD-10-CM

## 2024-07-09 DIAGNOSIS — K21.9 GASTROESOPHAGEAL REFLUX DISEASE WITHOUT ESOPHAGITIS: ICD-10-CM

## 2024-07-09 DIAGNOSIS — E11.8 TYPE 2 DIABETES MELLITUS WITH COMPLICATION, WITHOUT LONG-TERM CURRENT USE OF INSULIN (H): ICD-10-CM

## 2024-07-09 DIAGNOSIS — I10 ESSENTIAL HYPERTENSION: ICD-10-CM

## 2024-07-09 LAB
ALBUMIN SERPL BCG-MCNC: 4.1 G/DL (ref 3.5–5.2)
ALP SERPL-CCNC: 82 U/L (ref 40–150)
ALT SERPL W P-5'-P-CCNC: 15 U/L (ref 0–50)
ANION GAP SERPL CALCULATED.3IONS-SCNC: 11 MMOL/L (ref 7–15)
AST SERPL W P-5'-P-CCNC: 21 U/L (ref 0–45)
BASOPHILS # BLD AUTO: 0 10E3/UL (ref 0–0.2)
BASOPHILS NFR BLD AUTO: 0 %
BILIRUB SERPL-MCNC: 0.2 MG/DL
BUN SERPL-MCNC: 25 MG/DL (ref 8–23)
CALCIUM SERPL-MCNC: 9.6 MG/DL (ref 8.2–9.6)
CHLORIDE SERPL-SCNC: 107 MMOL/L (ref 98–107)
CHOLEST SERPL-MCNC: 256 MG/DL
CREAT SERPL-MCNC: 1.07 MG/DL (ref 0.51–0.95)
DEPRECATED HCO3 PLAS-SCNC: 23 MMOL/L (ref 22–29)
EGFRCR SERPLBLD CKD-EPI 2021: 48 ML/MIN/1.73M2
EOSINOPHIL # BLD AUTO: 0.3 10E3/UL (ref 0–0.7)
EOSINOPHIL NFR BLD AUTO: 4 %
ERYTHROCYTE [DISTWIDTH] IN BLOOD BY AUTOMATED COUNT: 17.4 % (ref 10–15)
FASTING STATUS PATIENT QL REPORTED: ABNORMAL
FASTING STATUS PATIENT QL REPORTED: ABNORMAL
GLUCOSE SERPL-MCNC: 109 MG/DL (ref 70–99)
HBA1C MFR BLD: 6.6 % (ref 4–6.2)
HCT VFR BLD AUTO: 34.6 % (ref 35–47)
HDLC SERPL-MCNC: 44 MG/DL
HGB BLD-MCNC: 10 G/DL (ref 11.7–15.7)
IMM GRANULOCYTES # BLD: 0 10E3/UL
IMM GRANULOCYTES NFR BLD: 0 %
LDLC SERPL CALC-MCNC: 148 MG/DL
LYMPHOCYTES # BLD AUTO: 1.8 10E3/UL (ref 0.8–5.3)
LYMPHOCYTES NFR BLD AUTO: 22 %
MCH RBC QN AUTO: 23 PG (ref 26.5–33)
MCHC RBC AUTO-ENTMCNC: 28.9 G/DL (ref 31.5–36.5)
MCV RBC AUTO: 80 FL (ref 78–100)
MONOCYTES # BLD AUTO: 0.8 10E3/UL (ref 0–1.3)
MONOCYTES NFR BLD AUTO: 10 %
NEUTROPHILS # BLD AUTO: 5.1 10E3/UL (ref 1.6–8.3)
NEUTROPHILS NFR BLD AUTO: 63 %
NONHDLC SERPL-MCNC: 212 MG/DL
NRBC # BLD AUTO: 0 10E3/UL
NRBC BLD AUTO-RTO: 0 /100
PLATELET # BLD AUTO: 249 10E3/UL (ref 150–450)
POTASSIUM SERPL-SCNC: 4.3 MMOL/L (ref 3.4–5.3)
PROT SERPL-MCNC: 7.1 G/DL (ref 6.4–8.3)
RBC # BLD AUTO: 4.35 10E6/UL (ref 3.8–5.2)
SODIUM SERPL-SCNC: 141 MMOL/L (ref 135–145)
TRIGL SERPL-MCNC: 320 MG/DL
VIT B12 SERPL-MCNC: 514 PG/ML (ref 232–1245)
WBC # BLD AUTO: 8 10E3/UL (ref 4–11)

## 2024-07-09 PROCEDURE — 82607 VITAMIN B-12: CPT | Mod: ZL | Performed by: FAMILY MEDICINE

## 2024-07-09 PROCEDURE — 84295 ASSAY OF SERUM SODIUM: CPT | Mod: ZL | Performed by: FAMILY MEDICINE

## 2024-07-09 PROCEDURE — 80061 LIPID PANEL: CPT | Mod: ZL | Performed by: FAMILY MEDICINE

## 2024-07-09 PROCEDURE — 99207 PR FOOT EXAM NO CHARGE: CPT | Performed by: FAMILY MEDICINE

## 2024-07-09 PROCEDURE — 85025 COMPLETE CBC W/AUTO DIFF WBC: CPT | Mod: ZL | Performed by: FAMILY MEDICINE

## 2024-07-09 PROCEDURE — G0439 PPPS, SUBSEQ VISIT: HCPCS | Performed by: FAMILY MEDICINE

## 2024-07-09 PROCEDURE — 99213 OFFICE O/P EST LOW 20 MIN: CPT | Mod: 25 | Performed by: FAMILY MEDICINE

## 2024-07-09 PROCEDURE — 83036 HEMOGLOBIN GLYCOSYLATED A1C: CPT | Mod: ZL | Performed by: FAMILY MEDICINE

## 2024-07-09 PROCEDURE — 36415 COLL VENOUS BLD VENIPUNCTURE: CPT | Mod: ZL | Performed by: FAMILY MEDICINE

## 2024-07-09 PROCEDURE — G0463 HOSPITAL OUTPT CLINIC VISIT: HCPCS

## 2024-07-09 RX ORDER — MIRTAZAPINE 15 MG/1
TABLET, FILM COATED ORAL
Qty: 45 TABLET | Refills: 3 | Status: SHIPPED | OUTPATIENT
Start: 2024-07-09

## 2024-07-09 RX ORDER — MEMANTINE HYDROCHLORIDE 10 MG/1
10 TABLET ORAL 2 TIMES DAILY
Qty: 180 TABLET | Refills: 3 | Status: SHIPPED | OUTPATIENT
Start: 2024-07-09

## 2024-07-09 RX ORDER — METOPROLOL SUCCINATE 50 MG/1
TABLET, EXTENDED RELEASE ORAL
Qty: 90 TABLET | Refills: 3 | Status: SHIPPED | OUTPATIENT
Start: 2024-07-09

## 2024-07-09 RX ORDER — FAMOTIDINE 20 MG/1
TABLET, FILM COATED ORAL
Qty: 180 TABLET | Refills: 3 | Status: SHIPPED | OUTPATIENT
Start: 2024-07-09

## 2024-07-09 RX ORDER — VITAMIN B COMPLEX
2 TABLET ORAL DAILY
Qty: 90 TABLET | Refills: 3 | Status: SHIPPED | OUTPATIENT
Start: 2024-07-09

## 2024-07-09 RX ORDER — LANOLIN ALCOHOL/MO/W.PET/CERES
1000 CREAM (GRAM) TOPICAL DAILY
Qty: 90 TABLET | Refills: 3 | Status: SHIPPED | OUTPATIENT
Start: 2024-07-09

## 2024-07-09 RX ORDER — PAROXETINE 10 MG/1
TABLET, FILM COATED ORAL
Qty: 90 TABLET | Refills: 3 | Status: SHIPPED | OUTPATIENT
Start: 2024-07-09

## 2024-07-09 SDOH — HEALTH STABILITY: PHYSICAL HEALTH: ON AVERAGE, HOW MANY MINUTES DO YOU ENGAGE IN EXERCISE AT THIS LEVEL?: 0 MIN

## 2024-07-09 SDOH — HEALTH STABILITY: PHYSICAL HEALTH: ON AVERAGE, HOW MANY DAYS PER WEEK DO YOU ENGAGE IN MODERATE TO STRENUOUS EXERCISE (LIKE A BRISK WALK)?: 2 DAYS

## 2024-07-09 ASSESSMENT — PAIN SCALES - GENERAL: PAINLEVEL: NO PAIN (0)

## 2024-07-09 ASSESSMENT — SOCIAL DETERMINANTS OF HEALTH (SDOH): HOW OFTEN DO YOU GET TOGETHER WITH FRIENDS OR RELATIVES?: ONCE A WEEK

## 2024-07-09 NOTE — PROGRESS NOTES
Preventive Care Visit  [facility]  Skye Mosley MD, Family Medicine  Jul 9, 2024      Assessment & Plan     Medicare annual wellness visit, subsequent  Reviewed preventive cares.  She no other wishes to have mammograms.  Encouraged Tdap.  Recommend COVID and flu shot in the fall.    Low vitamin B12 level  Repeat vitamin B12 levels pending.  Continue vitamin B12 1000 mcg daily.  - cyanocobalamin (VITAMIN B-12) 1000 MCG tablet; Take 1 tablet (1,000 mcg) by mouth daily  - Vitamin B12; Future  - Vitamin B12    Gastroesophageal reflux disease without esophagitis  Has required double treatment with PPI and Pepcid.  - famotidine (PEPCID) 20 MG tablet; TAKE 1 TABLET(20 MG) BY MOUTH TWICE DAILY  - omeprazole (PRILOSEC) 20 MG DR capsule; Take 1 capsule (20 mg) by mouth daily    Dementia without behavioral disturbance (H)  Continues to have decline in short-term memory.  She is a bit more agitated today with her .  I was able to talk to him independently and he feels things are going well at home and that she is safe.  Continue on Namenda.  Did not tolerate Aricept.  - memantine (NAMENDA) 10 MG tablet; Take 1 tablet (10 mg) by mouth 2 times daily  - CBC with Platelets & Differential; Future  - CBC with Platelets & Differential    Essential hypertension  Well-controlled.  Continue on Toprol  - metoprolol succinate ER (TOPROL XL) 50 MG 24 hr tablet; TAKE 1/2 TABLET(25 MG) BY MOUTH TWICE DAILY  - Comprehensive Metabolic Panel; Future  - Comprehensive Metabolic Panel    Anxiety  History of generalized anxiety disorder with poor sleep.  Well-controlled with Paxil.  Sleeping much better since starting Remeron at bedtime  - mirtazapine (REMERON) 15 MG tablet; TAKE 1/2 TABLET BY MOUTH AT BEDTIME  - PARoxetine (PAXIL) 10 MG tablet; TAKE 1 TABLET BY MOUTH EVERY EVENING(5 TO 7 PM)    Vitamin D deficiency  Repeat vitamin D level is pending  - vitamin D3 25 mcg (1000 units) tablet; Take 2 tablets (50  "mcg) by mouth daily    Dyslipidemia  Repeat lipid panel pending  - Lipid Panel; Future  - Lipid Panel    Type 2 diabetes mellitus with complication, without long-term current use of insulin (H)  Diet-controlled diabetes.  - FOOT EXAM  - Hemoglobin A1c; Future  - Albumin Random Urine Quantitative with Creat Ratio; Future  - Hemoglobin A1c  - Albumin Random Urine Quantitative with Creat Ratio    Patient has been advised of split billing requirements and indicates understanding: Yes        BMI  Estimated body mass index is 30.38 kg/m  as calculated from the following:    Height as of this encounter: 1.549 m (5' 1\").    Weight as of this encounter: 72.9 kg (160 lb 12.8 oz).       Counseling  Appropriate preventive services were discussed with this patient, including applicable screening as appropriate for fall prevention, nutrition, physical activity, Tobacco-use cessation, weight loss and cognition.  Checklist reviewing preventive services available has been given to the patient.  Reviewed patient's diet, addressing concerns and/or questions.   She is at risk for lack of exercise and has been provided with information to increase physical activity for the benefit of her well-being.   She is at risk for psychosocial distress and has been provided with information to reduce risk.   Discussed possible causes of fatigue.     There are no Patient Instructions on file for this visit.    No follow-ups on file.    Roseanna Philippe is a 89 year old, presenting for the following:  Medicare Visit        7/9/2024     3:13 PM   Additional Questions   Roomed by Janice su   Accompanied by spouse Angelo         Health Care Directive  Patient has a Health Care Directive on file  Advance care planning document is on file and is current.    HPI      89-year-old female presents for Medicare wellness exam and follow-up of chronic medical problems.  Her  is here with her today.  They continue to live independently in her own home.  " They spend her summers on a cabin and take them out.   feels he can continue caring for her.  She is a bit agitated today that her  is needing to contribute to the history.  She does not think that she takes meds but her  does dispense meds daily.  She struggles with low back pain.  She feels she is not bothered by this but her  thinks it is becoming more of an issue.  There is been no recent falls.  No numbness tingling or weakness.      Diabetes Follow-up    How often are you checking your blood sugar? Not at all  What concerns do you have today about your diabetes? None   Do you have any of these symptoms? (Select all that apply)  No numbness or tingling in feet.  No redness, sores or blisters on feet.  No complaints of excessive thirst.  No reports of blurry vision.  No significant changes to weight.  Have you had a diabetic eye exam in the last 12 months? No            Hyperlipidemia Follow-Up    Are you regularly taking any medication or supplement to lower your cholesterol?   Yes-    Are you having muscle aches or other side effects that you think could be caused by your cholesterol lowering medication?  No    Hypertension Follow-up    Do you check your blood pressure regularly outside of the clinic? Yes   Are you following a low salt diet? Yes  Are your blood pressures ever more than 140 on the top number (systolic) OR more   than 90 on the bottom number (diastolic), for example 140/90? No    BP Readings from Last 2 Encounters:   07/09/24 (!) 164/70   03/07/23 126/64     Hemoglobin A1C (%)   Date Value   07/27/2020 6.4 (H)   11/26/2019 6.3 (H)     LDL Cholesterol Calculated (mg/dL)   Date Value   03/07/2023 180 (H)         7/9/2024   General Health   How would you rate your overall physical health? Excellent   Feel stress (tense, anxious, or unable to sleep) Only a little      (!) STRESS CONCERN      7/9/2024   Nutrition   Diet: Regular (no restrictions)    Low salt       Multiple  values from one day are sorted in reverse-chronological order         7/9/2024   Exercise   Days per week of moderate/strenous exercise 2 days   Average minutes spent exercising at this level 0 min      (!) EXERCISE CONCERN      7/9/2024   Social Factors   Frequency of gathering with friends or relatives Once a week   Worry food won't last until get money to buy more No   Food not last or not have enough money for food? No   Do you have housing? (Housing is defined as stable permanent housing and does not include staying ouside in a car, in a tent, in an abandoned building, in an overnight shelter, or couch-surfing.) Yes   Are you worried about losing your housing? No   Lack of transportation? No   Unable to get utilities (heat,electricity)? No            7/9/2024   Fall Risk   Fallen 2 or more times in the past year? No    No   Trouble with walking or balance? No    Yes       Multiple values from one day are sorted in reverse-chronological order          7/9/2024   Activities of Daily Living- Home Safety   Needs help with the following daily activites None of the above   Safety concerns in the home None of the above            7/9/2024   Dental   Dentist two times every year? Yes            7/9/2024   Hearing Screening   Hearing concerns? None of the above            7/9/2024   Driving Risk Screening   Patient/family members have concerns about driving No            7/9/2024   General Alertness/Fatigue Screening   Have you been more tired than usual lately? (!) YES            7/9/2024   Urinary Incontinence Screening   Bothered by leaking urine in past 6 months No            7/9/2024   TB Screening   Were you born outside of the [place]? No            Today's PHQ-2 Score:       7/9/2024     3:16 PM   PHQ-2 ( 1999 [employer])   Q1: Little interest or pleasure in doing things 0   Q2: Feeling down, depressed or hopeless 0   PHQ-2 Score 0   Q1: Little interest or pleasure in doing things Not at all   Q2: Feeling down, depressed  or hopeless Not at all   PHQ-2 Score 0           7/9/2024   Substance Use   Alcohol more than 3/day or more than 7/wk No   Do you have a current opioid prescription? No   How severe/bad is pain from 1 to 10? 3/10   Do you use any other substances recreationally? (!) ALCOHOL        Social History     Tobacco Use    Smoking status: Former     Passive exposure: Never    Smokeless tobacco: Never   Vaping Use    Vaping status: Never Used   Substance Use Topics    Alcohol use: Yes     Alcohol/week: 2.0 standard drinks of alcohol     Comment: Alcoholic Drinks/day: 1 a night    Drug use: No          Mammogram Screening - After age 74- determine frequency with patient based on health status, life expectancy and patient goals          Reviewed and updated as needed this visit by Provider                    Past Medical History:   Diagnosis Date    ACP (advance care planning) 12/12/2013    Anemia, iron deficiency     Bilateral carpal tunnel syndrome 2/23/2018    Closed Colles' fracture of right radius with routine healing 04/13/2017    Cognitive decline     Depression with anxiety 02/01/2018    Overview:  Chronic anxiety    Dysrhythmia     With hospitalization    Hypercholesterolemia 2/1/2018    Hypertension 11/18/2010    Osteopenia     Spinal stenosis, lumbar 2/1/2018    Overview:  Degenerative lumbar disc disease with lumbar spinal stenosis     Past Surgical History:   Procedure Laterality Date    ARTHROPLASTY HIP      3/9/09,Left, by Ashish Ivory M.D.    BLEPHAROPLASTY  04/18/2016    by Dr White Left brow lift    CERVICAL POLYPECTOMY      Endocervical polyps    CYSTOCELE REPAIR      Uterine prolapse and vaginal vault prolapse with cystocele    EXTRACAPSULAR CATARACT EXTRATION WITH INTRAOCULAR LENS IMPLANT Bilateral 2010    HIP SURGERY Left 2012    Times 2    HIP SURGERY Right     HYSTERECTOMY TOTAL ABDOMINAL      Rectocele    OPEN REDUCTION INTERNAL FIXATION WRIST Right 04/21/2017    Right,Synthes stainless steel  "variable angle volar radial plate with T8 screw heads.    RELEASE CARPAL TUNNEL Right 4/25/2018    Procedure: RELEASE CARPAL TUNNEL;  Right Open Carpal Tunnel Release;  Surgeon: Gerald Martínez DO;  Location: [facility] OR    SALPINGO-OOPHORECTOMY BILATERAL      4/16/98,Bilateral salpingo oophorectomy and vaginal vault suspension     Current providers sharing in care for this patient include:  Patient Care Team:  Skye Colon MD as PCP - General (Family Medicine)  Guru Misha Laboy MD as MD (Gastroenterology)  Skye Colon MD as Assigned PCP    The following health maintenance items are reviewed in Epic and correct as of today:  Health Maintenance   Topic Date Due    MICROALBUMIN  Never done    EYE EXAM  Never done    A1C  01/27/2021    DTAP/TDAP/TD IMMUNIZATION (3 - Td or Tdap) 03/09/2022    COVID-19 Vaccine (7 - 2023-24 season) 02/10/2024    MEDICARE ANNUAL WELLNESS VISIT  03/07/2024    BMP  03/07/2024    LIPID  03/07/2024    DIABETIC FOOT EXAM  03/07/2024    INFLUENZA VACCINE (1) 09/01/2024    FALL RISK ASSESSMENT  07/09/2025    ADVANCE CARE PLANNING  03/09/2028    PHQ-2 (once per calendar year)  Completed    Pneumococcal Vaccine: 65+ Years  Completed    ZOSTER IMMUNIZATION  Completed    RSV VACCINE (Pregnancy & 60+)  Completed    IPV IMMUNIZATION  Aged Out    HPV IMMUNIZATION  Aged Out    MENINGITIS IMMUNIZATION  Aged Out    RSV MONOCLONAL ANTIBODY  Aged Out         Review of Systems  Constitutional, HEENT, cardiovascular, pulmonary, gi and gu systems are negative, except as otherwise noted.     Objective    Exam  BP (!) 164/70   Pulse 87   Temp 98.1  F (36.7  C) (Tympanic)   Resp 20   Ht 1.549 m (5' 1\")   Wt 72.9 kg (160 lb 12.8 oz)   LMP  (LMP Unknown)   SpO2 97%   Breastfeeding No   BMI 30.38 kg/m     Estimated body mass index is 30.38 kg/m  as calculated from the following:    Height as of this encounter: 1.549 m (5' 1\").    Weight as of this " encounter: 72.9 kg (160 lb 12.8 oz).    Physical Exam  GENERAL: alert and no distress  NECK: no adenopathy, no asymmetry, masses, or scars  RESP: lungs clear to auscultation - no rales, rhonchi or wheezes  CV: regular rate and rhythm, normal S1 S2, no S3 or S4, no murmur, click or rub, no peripheral edema  MS: no gross musculoskeletal defects noted, no edema  SKIN: no suspicious lesions or rashes  NEURO: Normal strength and tone, mentation intact and speech normal  PSYCH: mentation appears normal, affect normal/bright  She is able to rise from a seated position and walk across the room.  She appears to have good balance.  Gait is normal.  Once again a bit agitated with her  today.        7/9/2024   Mini Cog   Clock Draw Score 0 Abnormal   3 Item Recall 0 objects recalled   Mini Cog Total Score 0                 Signed Electronically by: Skye Mosley MD

## 2024-07-09 NOTE — LETTER
July 11, 2024      [place]  [address] [address]        Dear ,    We are writing to inform you of your test results.      Hemoglobin was low indicating anemia.  This is new when compared to your labs done 1 year ago.  This could indicate blood loss from the GI tract.  Could consider proceeding with upper and lower endoscopy to determine potential source of blood loss.  Please let me know if you wish to proceed with this.  Another option would be to repeat the hemoglobin in 2 months and ensure that it remains stable.    Resulted Orders   Comprehensive Metabolic Panel   Result Value Ref Range    Sodium 141 135 - 145 mmol/L    Potassium 4.3 3.4 - 5.3 mmol/L    Carbon Dioxide (CO2) 23 22 - 29 mmol/L    Anion Gap 11 7 - 15 mmol/L    Urea Nitrogen 25.0 (H) 8.0 - 23.0 mg/dL    Creatinine 1.07 (H) 0.51 - 0.95 mg/dL    GFR Estimate 48 (L) >60 mL/min/1.73m2      Comment:      eGFR calculated using 2021 CKD-EPI equation.    Calcium 9.6 8.2 - 9.6 mg/dL    Chloride 107 98 - 107 mmol/L    Glucose 109 (H) 70 - 99 mg/dL    Alkaline Phosphatase 82 40 - 150 U/L    AST 21 0 - 45 U/L      Comment:      Reference intervals for this test were updated on 6/12/2023 to more accurately reflect our healthy population. There may be differences in the flagging of prior results with similar values performed with this method. Interpretation of those prior results can be made in the context of the updated reference intervals.    ALT 15 0 - 50 U/L      Comment:      Reference intervals for this test were updated on 6/12/2023 to more accurately reflect our healthy population. There may be differences in the flagging of prior results with similar values performed with this method. Interpretation of those prior results can be made in the context of the updated reference intervals.      Protein Total 7.1 6.4 - 8.3 g/dL    Albumin 4.1 3.5 - 5.2 g/dL    Bilirubin Total 0.2 <=1.2 mg/dL    Patient Fasting > 8hrs?  Unknown    Vitamin B12   Result Value Ref Range    Vitamin B12 514 232 - 1,245 pg/mL   Lipid Panel   Result Value Ref Range    Cholesterol 256 (H) <200 mg/dL    Triglycerides 320 (H) <150 mg/dL    Direct Measure HDL 44 (L) >=50 mg/dL    LDL Cholesterol Calculated 148 (H) <=100 mg/dL    Non HDL Cholesterol 212 (H) <130 mg/dL    Patient Fasting > 8hrs? Unknown     Narrative    Cholesterol  Desirable:  <200 mg/dL    Triglycerides  Normal:  Less than 150 mg/dL  Borderline High:  150-199 mg/dL  High:  200-499 mg/dL  Very High:  Greater than or equal to 500 mg/dL    Direct Measure HDL  Female:  Greater than or equal to 50 mg/dL   Male:  Greater than or equal to 40 mg/dL    LDL Cholesterol  Desirable:  <100mg/dL  Above Desirable:  100-129 mg/dL   Borderline High:  130-159 mg/dL   High:  160-189 mg/dL   Very High:  >= 190 mg/dL    Non HDL Cholesterol  Desirable:  130 mg/dL  Above Desirable:  130-159 mg/dL  Borderline High:  160-189 mg/dL  High:  190-219 mg/dL  Very High:  Greater than or equal to 220 mg/dL   Hemoglobin A1c   Result Value Ref Range    Hemoglobin A1C 6.6 (H) 4.0 - 6.2 %   CBC with platelets and differential   Result Value Ref Range    WBC Count 8.0 4.0 - 11.0 10e3/uL    RBC Count 4.35 3.80 - 5.20 10e6/uL    Hemoglobin 10.0 (L) 11.7 - 15.7 g/dL    Hematocrit 34.6 (L) 35.0 - 47.0 %    MCV 80 78 - 100 fL    MCH 23.0 (L) 26.5 - 33.0 pg    MCHC 28.9 (L) 31.5 - 36.5 g/dL    RDW 17.4 (H) 10.0 - 15.0 %    Platelet Count 249 150 - 450 10e3/uL    % Neutrophils 63 %    % Lymphocytes 22 %    % Monocytes 10 %    % Eosinophils 4 %    % Basophils 0 %    % Immature Granulocytes 0 %    NRBCs per 100 WBC 0 <1 /100    Absolute Neutrophils 5.1 1.6 - 8.3 10e3/uL    Absolute Lymphocytes 1.8 0.8 - 5.3 10e3/uL    Absolute Monocytes 0.8 0.0 - 1.3 10e3/uL    Absolute Eosinophils 0.3 0.0 - 0.7 10e3/uL    Absolute Basophils 0.0 0.0 - 0.2 10e3/uL    Absolute Immature Granulocytes 0.0 <=0.4 10e3/uL    Absolute NRBCs 0.0 10e3/uL       If  you have any questions or concerns, please call the clinic at the number listed above.       Sincerely,      Skye Arriaga MD

## 2024-07-09 NOTE — NURSING NOTE
Patient is here for annual medicare wellness, no concerns at this time.     No LMP recorded (lmp unknown). Patient has had a hysterectomy.  Medication Reconciliation: complete    Janice Fiore LPN 7/9/2024 3:23 PM       Advance care directive on file? yes  Advance care directive provided to patient? na       Janice Fiore LPN

## 2024-10-09 ENCOUNTER — IMMUNIZATION (OUTPATIENT)
Dept: FAMILY MEDICINE | Facility: OTHER | Age: 89
End: 2024-10-09
Attending: FAMILY MEDICINE
Payer: MEDICARE

## 2024-10-09 DIAGNOSIS — Z23 HIGH PRIORITY FOR 2019-NCOV VACCINE: ICD-10-CM

## 2024-10-09 DIAGNOSIS — Z23 NEED FOR PROPHYLACTIC VACCINATION AND INOCULATION AGAINST INFLUENZA: Primary | ICD-10-CM

## 2024-10-09 PROCEDURE — 91320 SARSCV2 VAC 30MCG TRS-SUC IM: CPT

## 2024-10-09 PROCEDURE — 90480 ADMN SARSCOV2 VAC 1/ONLY CMP: CPT

## 2024-10-09 PROCEDURE — G0008 ADMIN INFLUENZA VIRUS VAC: HCPCS

## 2025-01-12 ENCOUNTER — HEALTH MAINTENANCE LETTER (OUTPATIENT)
Age: OVER 89
End: 2025-01-12

## 2025-01-14 ENCOUNTER — OFFICE VISIT (OUTPATIENT)
Dept: FAMILY MEDICINE | Facility: OTHER | Age: OVER 89
End: 2025-01-14
Attending: FAMILY MEDICINE
Payer: MEDICARE

## 2025-01-14 VITALS
DIASTOLIC BLOOD PRESSURE: 80 MMHG | BODY MASS INDEX: 30.62 KG/M2 | HEART RATE: 85 BPM | SYSTOLIC BLOOD PRESSURE: 138 MMHG | WEIGHT: 162.2 LBS | RESPIRATION RATE: 18 BRPM | TEMPERATURE: 97.6 F | HEIGHT: 61 IN | OXYGEN SATURATION: 96 %

## 2025-01-14 DIAGNOSIS — F03.90 DEMENTIA WITHOUT BEHAVIORAL DISTURBANCE (H): Primary | ICD-10-CM

## 2025-01-14 DIAGNOSIS — M54.50 ACUTE RIGHT-SIDED LOW BACK PAIN WITHOUT SCIATICA: ICD-10-CM

## 2025-01-14 DIAGNOSIS — D64.9 ANEMIA, UNSPECIFIED TYPE: ICD-10-CM

## 2025-01-14 DIAGNOSIS — E11.9 TYPE 2 DIABETES MELLITUS WITHOUT COMPLICATION, WITHOUT LONG-TERM CURRENT USE OF INSULIN (H): ICD-10-CM

## 2025-01-14 LAB
ALBUMIN SERPL BCG-MCNC: 4 G/DL (ref 3.5–5.2)
ALP SERPL-CCNC: 73 U/L (ref 40–150)
ALT SERPL W P-5'-P-CCNC: 18 U/L (ref 0–50)
ANION GAP SERPL CALCULATED.3IONS-SCNC: 11 MMOL/L (ref 7–15)
AST SERPL W P-5'-P-CCNC: 23 U/L (ref 0–45)
BASOPHILS # BLD AUTO: 0 10E3/UL (ref 0–0.2)
BASOPHILS NFR BLD AUTO: 1 %
BILIRUB SERPL-MCNC: 0.2 MG/DL
BUN SERPL-MCNC: 24.1 MG/DL (ref 8–23)
CALCIUM SERPL-MCNC: 9.4 MG/DL (ref 8.8–10.4)
CHLORIDE SERPL-SCNC: 108 MMOL/L (ref 98–107)
CREAT SERPL-MCNC: 1.04 MG/DL (ref 0.51–0.95)
EGFRCR SERPLBLD CKD-EPI 2021: 50 ML/MIN/1.73M2
EOSINOPHIL # BLD AUTO: 0.3 10E3/UL (ref 0–0.7)
EOSINOPHIL NFR BLD AUTO: 4 %
ERYTHROCYTE [DISTWIDTH] IN BLOOD BY AUTOMATED COUNT: 15.2 % (ref 10–15)
EST. AVERAGE GLUCOSE BLD GHB EST-MCNC: 131 MG/DL
FERRITIN SERPL-MCNC: 41 NG/ML (ref 11–328)
GLUCOSE SERPL-MCNC: 130 MG/DL (ref 70–99)
HBA1C MFR BLD: 6.2 %
HCO3 SERPL-SCNC: 25 MMOL/L (ref 22–29)
HCT VFR BLD AUTO: 41.9 % (ref 35–47)
HGB BLD-MCNC: 13.5 G/DL (ref 11.7–15.7)
IMM GRANULOCYTES # BLD: 0 10E3/UL
IMM GRANULOCYTES NFR BLD: 1 %
LYMPHOCYTES # BLD AUTO: 1.4 10E3/UL (ref 0.8–5.3)
LYMPHOCYTES NFR BLD AUTO: 23 %
MCH RBC QN AUTO: 31.1 PG (ref 26.5–33)
MCHC RBC AUTO-ENTMCNC: 32.2 G/DL (ref 31.5–36.5)
MCV RBC AUTO: 97 FL (ref 78–100)
MONOCYTES # BLD AUTO: 0.5 10E3/UL (ref 0–1.3)
MONOCYTES NFR BLD AUTO: 8 %
NEUTROPHILS # BLD AUTO: 3.8 10E3/UL (ref 1.6–8.3)
NEUTROPHILS NFR BLD AUTO: 64 %
NRBC # BLD AUTO: 0 10E3/UL
NRBC BLD AUTO-RTO: 0 /100
PLATELET # BLD AUTO: 177 10E3/UL (ref 150–450)
POTASSIUM SERPL-SCNC: 4.4 MMOL/L (ref 3.4–5.3)
PROT SERPL-MCNC: 6.8 G/DL (ref 6.4–8.3)
RBC # BLD AUTO: 4.34 10E6/UL (ref 3.8–5.2)
SODIUM SERPL-SCNC: 144 MMOL/L (ref 135–145)
WBC # BLD AUTO: 5.9 10E3/UL (ref 4–11)

## 2025-01-14 PROCEDURE — 85025 COMPLETE CBC W/AUTO DIFF WBC: CPT | Mod: ZL | Performed by: FAMILY MEDICINE

## 2025-01-14 PROCEDURE — 82247 BILIRUBIN TOTAL: CPT | Mod: ZL | Performed by: FAMILY MEDICINE

## 2025-01-14 PROCEDURE — 82728 ASSAY OF FERRITIN: CPT | Mod: ZL | Performed by: FAMILY MEDICINE

## 2025-01-14 PROCEDURE — 36415 COLL VENOUS BLD VENIPUNCTURE: CPT | Mod: ZL | Performed by: FAMILY MEDICINE

## 2025-01-14 PROCEDURE — 82310 ASSAY OF CALCIUM: CPT | Mod: ZL | Performed by: FAMILY MEDICINE

## 2025-01-14 PROCEDURE — 83036 HEMOGLOBIN GLYCOSYLATED A1C: CPT | Mod: ZL | Performed by: FAMILY MEDICINE

## 2025-01-14 RX ORDER — SENNOSIDES 8.6 MG
650 CAPSULE ORAL 2 TIMES DAILY
Qty: 180 TABLET | Refills: 3 | Status: SHIPPED | OUTPATIENT
Start: 2025-01-14

## 2025-01-14 ASSESSMENT — ANXIETY QUESTIONNAIRES
2. NOT BEING ABLE TO STOP OR CONTROL WORRYING: NOT AT ALL
GAD7 TOTAL SCORE: 0
7. FEELING AFRAID AS IF SOMETHING AWFUL MIGHT HAPPEN: NOT AT ALL
7. FEELING AFRAID AS IF SOMETHING AWFUL MIGHT HAPPEN: NOT AT ALL
5. BEING SO RESTLESS THAT IT IS HARD TO SIT STILL: NOT AT ALL
GAD7 TOTAL SCORE: 0
GAD7 TOTAL SCORE: 0
1. FEELING NERVOUS, ANXIOUS, OR ON EDGE: NOT AT ALL
4. TROUBLE RELAXING: NOT AT ALL
3. WORRYING TOO MUCH ABOUT DIFFERENT THINGS: NOT AT ALL
6. BECOMING EASILY ANNOYED OR IRRITABLE: NOT AT ALL

## 2025-01-14 ASSESSMENT — PAIN SCALES - GENERAL: PAINLEVEL_OUTOF10: NO PAIN (0)

## 2025-01-14 ASSESSMENT — PATIENT HEALTH QUESTIONNAIRE - PHQ9
SUM OF ALL RESPONSES TO PHQ QUESTIONS 1-9: 0
10. IF YOU CHECKED OFF ANY PROBLEMS, HOW DIFFICULT HAVE THESE PROBLEMS MADE IT FOR YOU TO DO YOUR WORK, TAKE CARE OF THINGS AT HOME, OR GET ALONG WITH OTHER PEOPLE: NOT DIFFICULT AT ALL
SUM OF ALL RESPONSES TO PHQ QUESTIONS 1-9: 0

## 2025-01-14 NOTE — NURSING NOTE
"Patient is here to follow up, is unsure if any specific concerns. States she does not think she take any medications.     Previous A1C is at goal of <8  Lab Results   Component Value Date    A1C 6.6 07/09/2024    A1C 6.4 07/27/2020    A1C 6.3 11/26/2019    A1C 6.2 03/25/2019    A1C 6.5 11/28/2018     Urine Microalbumin/Creat:    No results found for: \"MICROL\"  No results found for: \"UMALCR\"  No results found for: \"UCRR\"  Foot exam 7/2024  Eye exam due    Tobacco User no  Patient is on a daily aspirin  Patient is not on a Statin.  Blood pressure today of:     BP Readings from Last 1 Encounters:   01/14/25 138/80      is at the goal of <139/89 for diabetics.    Janice Fiore LPN on 1/14/2025 at 1:26 PM      "

## 2025-01-14 NOTE — PROGRESS NOTES
"  {PROVIDER CHARTING PREFERENCE:997344}    Roseanna Philippe is a 89 year old, presenting for the following health issues:  Follow Up and Diabetes    89 yo female presents for recheck on chronic medical problems.        History of Present Illness       Back Pain:  She presents for follow up of back pain. Patient's back pain is a chronic problem.  Location of back pain:  Left buttock  Description of back pain: dull ache  Back pain spreads: nowhere    Since patient first noticed back pain, pain is: unchanged  Does back pain interfere with her job:  Not applicable       Reason for visit:  H    She eats 0-1 servings of fruits and vegetables daily.She consumes 3 sweetened beverage(s) daily.She exercises with enough effort to increase her heart rate 9 or less minutes per day.  She exercises with enough effort to increase her heart rate 3 or less days per week. She is missing 2 dose(s) of medications per week.       {MA/LPN/RN Pre-Provider Visit Orders- hCG/UA/Strep (Optional):394545}  {SUPERLIST (Optional):347789}  {additonal problems for provider to add (Optional):744730}    {ROS Picklists (Optional):922154}      Objective    /80   Pulse 85   Temp 97.6  F (36.4  C) (Tympanic)   Resp 18   Ht 1.549 m (5' 1\")   Wt 73.6 kg (162 lb 3.2 oz)   LMP  (LMP Unknown)   SpO2 96%   Breastfeeding No   BMI 30.65 kg/m    Body mass index is 30.65 kg/m .  Physical Exam   {Exam List (Optional):555788}    {Diagnostic Test Results (Optional):634596}        Signed Electronically by: Skye Mosley MD  {Email feedback regarding this note to [email or web address]   :[id number]}  "

## 2025-01-29 ENCOUNTER — APPOINTMENT (OUTPATIENT)
Dept: GENERAL RADIOLOGY | Facility: OTHER | Age: OVER 89
End: 2025-01-29
Payer: MEDICARE

## 2025-01-29 ENCOUNTER — HOSPITAL ENCOUNTER (EMERGENCY)
Facility: OTHER | Age: OVER 89
Discharge: HOME | End: 2025-01-29
Payer: MEDICARE

## 2025-01-29 ENCOUNTER — NURSE TRIAGE (OUTPATIENT)
Dept: FAMILY MEDICINE | Facility: OTHER | Age: OVER 89
End: 2025-01-29
Payer: COMMERCIAL

## 2025-01-29 VITALS
TEMPERATURE: 97.8 F | OXYGEN SATURATION: 96 % | RESPIRATION RATE: 20 BRPM | DIASTOLIC BLOOD PRESSURE: 89 MMHG | SYSTOLIC BLOOD PRESSURE: 155 MMHG | HEART RATE: 78 BPM | WEIGHT: 162 LBS | HEIGHT: 61 IN | BODY MASS INDEX: 30.58 KG/M2

## 2025-01-29 DIAGNOSIS — R11.10 VOMITING: ICD-10-CM

## 2025-01-29 LAB
ALBUMIN SERPL BCG-MCNC: 4.2 G/DL (ref 3.5–5.2)
ALBUMIN UR-MCNC: NEGATIVE MG/DL
ALP SERPL-CCNC: 70 U/L (ref 40–150)
ALT SERPL W P-5'-P-CCNC: 19 U/L (ref 0–50)
ANION GAP SERPL CALCULATED.3IONS-SCNC: 14 MMOL/L (ref 7–15)
APPEARANCE UR: CLEAR
AST SERPL W P-5'-P-CCNC: 23 U/L (ref 0–45)
BACTERIA #/AREA URNS HPF: ABNORMAL /HPF
BASOPHILS # BLD AUTO: 0.1 10E3/UL (ref 0–0.2)
BASOPHILS NFR BLD AUTO: 1 %
BILIRUB SERPL-MCNC: 0.4 MG/DL
BILIRUB UR QL STRIP: NEGATIVE
BUN SERPL-MCNC: 34.8 MG/DL (ref 8–23)
CALCIUM SERPL-MCNC: 10.2 MG/DL (ref 8.8–10.4)
CHLORIDE SERPL-SCNC: 102 MMOL/L (ref 98–107)
COLOR UR AUTO: ABNORMAL
CREAT SERPL-MCNC: 1.14 MG/DL (ref 0.51–0.95)
EGFRCR SERPLBLD CKD-EPI 2021: 45 ML/MIN/1.73M2
EOSINOPHIL # BLD AUTO: 0.3 10E3/UL (ref 0–0.7)
EOSINOPHIL NFR BLD AUTO: 3 %
ERYTHROCYTE [DISTWIDTH] IN BLOOD BY AUTOMATED COUNT: 14.9 % (ref 10–15)
FLUAV RNA SPEC QL NAA+PROBE: NEGATIVE
FLUBV RNA RESP QL NAA+PROBE: NEGATIVE
GLUCOSE BLDC GLUCOMTR-MCNC: 133 MG/DL (ref 70–99)
GLUCOSE SERPL-MCNC: 139 MG/DL (ref 70–99)
GLUCOSE UR STRIP-MCNC: NEGATIVE MG/DL
HCO3 SERPL-SCNC: 24 MMOL/L (ref 22–29)
HCT VFR BLD AUTO: 44.3 % (ref 35–47)
HGB BLD-MCNC: 14.2 G/DL (ref 11.7–15.7)
HGB UR QL STRIP: NEGATIVE
HOLD SPECIMEN: NORMAL
HOLD SPECIMEN: NORMAL
HYALINE CASTS: 2 /LPF
IMM GRANULOCYTES # BLD: 0 10E3/UL
IMM GRANULOCYTES NFR BLD: 0 %
KETONES UR STRIP-MCNC: NEGATIVE MG/DL
LEUKOCYTE ESTERASE UR QL STRIP: ABNORMAL
LIPASE SERPL-CCNC: 50 U/L (ref 13–60)
LYMPHOCYTES # BLD AUTO: 1.9 10E3/UL (ref 0.8–5.3)
LYMPHOCYTES NFR BLD AUTO: 21 %
MAGNESIUM SERPL-MCNC: 2.1 MG/DL (ref 1.7–2.3)
MCH RBC QN AUTO: 31.1 PG (ref 26.5–33)
MCHC RBC AUTO-ENTMCNC: 32.1 G/DL (ref 31.5–36.5)
MCV RBC AUTO: 97 FL (ref 78–100)
MONOCYTES # BLD AUTO: 0.7 10E3/UL (ref 0–1.3)
MONOCYTES NFR BLD AUTO: 8 %
MUCOUS THREADS #/AREA URNS LPF: PRESENT /LPF
NEUTROPHILS # BLD AUTO: 6 10E3/UL (ref 1.6–8.3)
NEUTROPHILS NFR BLD AUTO: 67 %
NITRATE UR QL: NEGATIVE
NRBC # BLD AUTO: 0 10E3/UL
NRBC BLD AUTO-RTO: 0 /100
PH UR STRIP: 6 [PH] (ref 5–9)
PLATELET # BLD AUTO: 141 10E3/UL (ref 150–450)
POTASSIUM SERPL-SCNC: 4.1 MMOL/L (ref 3.4–5.3)
PROT SERPL-MCNC: 7.5 G/DL (ref 6.4–8.3)
RBC # BLD AUTO: 4.57 10E6/UL (ref 3.8–5.2)
RBC URINE: 6 /HPF
RSV RNA SPEC NAA+PROBE: NEGATIVE
SARS-COV-2 RNA RESP QL NAA+PROBE: NEGATIVE
SODIUM SERPL-SCNC: 140 MMOL/L (ref 135–145)
SP GR UR STRIP: 1.02 (ref 1–1.03)
SQUAMOUS EPITHELIAL: 4 /HPF
TROPONIN T SERPL HS-MCNC: 11 NG/L
UROBILINOGEN UR STRIP-MCNC: NORMAL MG/DL
WBC # BLD AUTO: 9 10E3/UL (ref 4–11)
WBC URINE: 13 /HPF

## 2025-01-29 PROCEDURE — 96374 THER/PROPH/DIAG INJ IV PUSH: CPT

## 2025-01-29 PROCEDURE — 80053 COMPREHEN METABOLIC PANEL: CPT

## 2025-01-29 PROCEDURE — 85004 AUTOMATED DIFF WBC COUNT: CPT

## 2025-01-29 PROCEDURE — 36415 COLL VENOUS BLD VENIPUNCTURE: CPT

## 2025-01-29 PROCEDURE — 71046 X-RAY EXAM CHEST 2 VIEWS: CPT

## 2025-01-29 PROCEDURE — 83690 ASSAY OF LIPASE: CPT

## 2025-01-29 PROCEDURE — 99285 EMERGENCY DEPT VISIT HI MDM: CPT | Mod: 25

## 2025-01-29 PROCEDURE — 83735 ASSAY OF MAGNESIUM: CPT

## 2025-01-29 PROCEDURE — 81003 URINALYSIS AUTO W/O SCOPE: CPT

## 2025-01-29 PROCEDURE — 93005 ELECTROCARDIOGRAM TRACING: CPT

## 2025-01-29 PROCEDURE — 87086 URINE CULTURE/COLONY COUNT: CPT

## 2025-01-29 PROCEDURE — 250N000011 HC RX IP 250 OP 636

## 2025-01-29 PROCEDURE — 87637 SARSCOV2&INF A&B&RSV AMP PRB: CPT

## 2025-01-29 PROCEDURE — 93010 ELECTROCARDIOGRAM REPORT: CPT | Performed by: INTERNAL MEDICINE

## 2025-01-29 PROCEDURE — 84484 ASSAY OF TROPONIN QUANT: CPT

## 2025-01-29 PROCEDURE — 96375 TX/PRO/DX INJ NEW DRUG ADDON: CPT

## 2025-01-29 PROCEDURE — 82962 GLUCOSE BLOOD TEST: CPT

## 2025-01-29 PROCEDURE — 99284 EMERGENCY DEPT VISIT MOD MDM: CPT

## 2025-01-29 RX ORDER — ONDANSETRON 2 MG/ML
4 INJECTION INTRAMUSCULAR; INTRAVENOUS ONCE
Status: COMPLETED | OUTPATIENT
Start: 2025-01-29 | End: 2025-01-29

## 2025-01-29 RX ADMIN — FAMOTIDINE 20 MG: 10 INJECTION, SOLUTION INTRAVENOUS at 18:45

## 2025-01-29 RX ADMIN — ONDANSETRON 4 MG: 2 INJECTION INTRAMUSCULAR; INTRAVENOUS at 18:38

## 2025-01-29 ASSESSMENT — ACTIVITIES OF DAILY LIVING (ADL)
ADLS_ACUITY_SCORE: 41

## 2025-01-29 ASSESSMENT — COLUMBIA-SUICIDE SEVERITY RATING SCALE - C-SSRS
6. HAVE YOU EVER DONE ANYTHING, STARTED TO DO ANYTHING, OR PREPARED TO DO ANYTHING TO END YOUR LIFE?: NO
1. IN THE PAST MONTH, HAVE YOU WISHED YOU WERE DEAD OR WISHED YOU COULD GO TO SLEEP AND NOT WAKE UP?: NO
2. HAVE YOU ACTUALLY HAD ANY THOUGHTS OF KILLING YOURSELF IN THE PAST MONTH?: NO

## 2025-01-29 ASSESSMENT — ENCOUNTER SYMPTOMS
APPETITE CHANGE: 1
DIAPHORESIS: 1
CHILLS: 1
WEAKNESS: 1
VOMITING: 1

## 2025-01-29 NOTE — TELEPHONE ENCOUNTER
of patient called wondering what medically can be done as the patient has not been able to keep anything down for the past 3 days. She does not have a fever. She is having acid reflux as well. Please call patient's husbands.    Nani Rodriguez on 1/29/2025 at 11:36 AM

## 2025-01-29 NOTE — ED PROVIDER NOTES
"  History     Chief Complaint   Patient presents with    Abdominal Pain    Nausea & Vomiting     The history is provided by the patient, the spouse and medical records.     Mervat Lockett is a 89 year old female who presents to the ER today with her . History is provided by , as patient does not have much for a short term memory. Hx Dementia.  She has been vomiting since Saturday.  She has had decreased intake.  Sometimes when she has had a vomiting episode she is complaining of chest pain.  At one point in time she got sweaty and had chills.  She has been experiencing dizziness with standing. Vomit is sometimes liquid, sometimes food, per .  Tells me that she has chest \"discomfort\" and that she feels hungry and tired. She is not sure if she vomited today.   Denies fever, diarrhea, abdominal pain, urinary symptoms. Denies shortness of breath.   She tried Zofran, 2 doses without much relief of symptoms. Hasn't been able to take her home medications the last two days due to vomiting.     Allergies:  Allergies   Allergen Reactions    Aricept [Donepezil]     Paroxetine Anxiety       Problem List:    Patient Active Problem List    Diagnosis Date Noted    Hiatal hernia 09/14/2020     Priority: Medium    Dementia without behavioral disturbance (H)      Priority: Medium    Folic acid deficiency 12/14/2018     Priority: Medium    Type 2 diabetes mellitus with complication, without long-term current use of insulin (H) 12/14/2018     Priority: Medium    Pancreatic lesion 11/28/2018     Priority: Medium    Gastroesophageal reflux disease without esophagitis 11/28/2018     Priority: Medium    Hypercholesterolemia 02/01/2018     Priority: Medium    Spinal stenosis, lumbar 02/01/2018     Priority: Medium     Overview:   Degenerative lumbar disc disease with lumbar spinal stenosis      Osteoarthrosis, unspecified whether generalized or localized, pelvic region and thigh 07/09/2012     Priority: Medium    " Hypertension 2010     Priority: Medium        Past Medical History:    Past Medical History:   Diagnosis Date    ACP (advance care planning) 2013    Anemia, iron deficiency     Bilateral carpal tunnel syndrome 2018    Closed Colles' fracture of right radius with routine healing 2017    Cognitive decline     Depression with anxiety 2018    Dysrhythmia     Hypercholesterolemia 2018    Hypertension 2010    Osteopenia     Spinal stenosis, lumbar 2018       Past Surgical History:    Past Surgical History:   Procedure Laterality Date    ARTHROPLASTY HIP      3/9/09,Left, by Ashish Ivory M.D.    BLEPHAROPLASTY  2016    by Dr White Left brow lift    CERVICAL POLYPECTOMY      Endocervical polyps    CYSTOCELE REPAIR      Uterine prolapse and vaginal vault prolapse with cystocele    EXTRACAPSULAR CATARACT EXTRATION WITH INTRAOCULAR LENS IMPLANT Bilateral     HIP SURGERY Left 2012    Times 2    HIP SURGERY Right     HYSTERECTOMY TOTAL ABDOMINAL      Rectocele    OPEN REDUCTION INTERNAL FIXATION WRIST Right 2017    Right,Synthes stainless steel variable angle volar radial plate with T8 screw heads.    RELEASE CARPAL TUNNEL Right 2018    Procedure: RELEASE CARPAL TUNNEL;  Right Open Carpal Tunnel Release;  Surgeon: Gerald Martínez DO;  Location: [facility]    SALPINGO-OOPHORECTOMY BILATERAL      98,Bilateral salpingo oophorectomy and vaginal vault suspension       Family History:    Family History   Problem Relation Age of Onset    Other - See Comments Mother          of pneumonia    Heart Disease Father         CHF    Thyroid Disease Daughter         Thyroid Disease,Hypothyroid.    Blood Disease Daughter         Blood Disease,severe anemia with a hgb of less than 5    Family History Negative Son         Good Health    Family History Negative Daughter         Good Health    Thyroid Disease Daughter         Thyroid Disease,Hypothyroid.    Arthritis Other       "   Arthritis,Father's side has rheumatoid arthritis       Social History:  Marital Status:   [2]  Social History     Tobacco Use    Smoking status: Former     Passive exposure: Never    Smokeless tobacco: Never   Vaping Use    Vaping status: Never Used   Substance Use Topics    Alcohol use: Yes     Alcohol/week: 2.0 standard drinks of alcohol     Comment: Alcoholic Drinks/day: 1 a night    Drug use: No        Medications:    cyanocobalamin (VITAMIN B-12) 1000 MCG tablet  famotidine (PEPCID) 20 MG tablet  memantine (NAMENDA) 10 MG tablet  metoprolol succinate ER (TOPROL XL) 50 MG 24 hr tablet  multivitamins w/minerals tablet  ondansetron (ZOFRAN-ODT) 4 MG ODT tab  vitamin D3 25 mcg (1000 units) tablet  acetaminophen (TYLENOL) 650 MG CR tablet  mirtazapine (REMERON) 15 MG tablet  PARoxetine (PAXIL) 10 MG tablet  triamcinolone (KENALOG) 0.1 % external lotion          Review of Systems   Constitutional:  Positive for appetite change, chills and diaphoresis.   Cardiovascular:  Positive for chest pain.   Gastrointestinal:  Positive for vomiting.   Neurological:  Positive for weakness.   All other systems reviewed and are negative.  See HPI    Physical Exam   BP: (!) 142/81  Pulse: 108  Temp: 97.8  F (36.6  C)  Resp: 20  Height: 154.9 cm (5' 1\")  Weight: 73.5 kg (162 lb)  SpO2: 95 %      Physical Exam  Vitals and nursing note reviewed.   Constitutional:       General: She is not in acute distress.     Appearance: Normal appearance. She is not ill-appearing or toxic-appearing.   HENT:      Head: Normocephalic.      Nose: Nose normal.      Mouth/Throat:      Mouth: Mucous membranes are moist.   Eyes:      Pupils: Pupils are equal, round, and reactive to light.   Cardiovascular:      Rate and Rhythm: Normal rate and regular rhythm.      Pulses: Normal pulses.      Heart sounds: Normal heart sounds.   Pulmonary:      Effort: Pulmonary effort is normal.      Breath sounds: Normal breath sounds.   Abdominal:      General: " Abdomen is flat.      Palpations: Abdomen is soft.      Tenderness: There is no abdominal tenderness. There is no right CVA tenderness or left CVA tenderness.   Musculoskeletal:         General: Normal range of motion.      Cervical back: Neck supple.   Skin:     General: Skin is warm.      Capillary Refill: Capillary refill takes less than 2 seconds.   Neurological:      General: No focal deficit present.      Mental Status: She is alert. Mental status is at baseline.   Psychiatric:         Attention and Perception: Attention normal.         Mood and Affect: Mood normal.         Behavior: Behavior normal. Behavior is cooperative.         ED Course            EKG Interpretation:      Interpreted by ALANA Camarena CNP  Time reviewed: 1707  Symptoms at time of EKG: vomiting, chest pain   Rhythm: normal sinus   Rate: Normal  Ectopy: none  Conduction: normal  ST Segments/ T Waves: no STEMI, acute changes  Comparison to prior: similar   Clinical Impression: as above, stable EKG  Pending  EKG over read by internal medicine       Results for orders placed or performed during the hospital encounter of 01/29/25 (from the past 24 hours)   CBC with platelets differential    Narrative    The following orders were created for panel order CBC with platelets differential.  Procedure                               Abnormality         Status                     ---------                               -----------         ------                     CBC with platelets and d...[[id number]]  Abnormal            Final result               RBC and Platelet Morphology[[id number]]                                                   Please view results for these tests on the individual orders.   Comprehensive metabolic panel   Result Value Ref Range    Sodium 140 135 - 145 mmol/L    Potassium 4.1 3.4 - 5.3 mmol/L    Carbon Dioxide (CO2) 24 22 - 29 mmol/L    Anion Gap 14 7 - 15 mmol/L    Urea Nitrogen 34.8 (H) 8.0 - 23.0 mg/dL    Creatinine  1.14 (H) 0.51 - 0.95 mg/dL    GFR Estimate 45 (L) >60 mL/min/1.73m2    Calcium 10.2 8.8 - 10.4 mg/dL    Chloride 102 98 - 107 mmol/L    Glucose 139 (H) 70 - 99 mg/dL    Alkaline Phosphatase 70 40 - 150 U/L    AST 23 0 - 45 U/L    ALT 19 0 - 50 U/L    Protein Total 7.5 6.4 - 8.3 g/dL    Albumin 4.2 3.5 - 5.2 g/dL    Bilirubin Total 0.4 <=1.2 mg/dL   Lipase   Result Value Ref Range    Lipase 50 13 - 60 U/L   Troponin T, High Sensitivity   Result Value Ref Range    Troponin T, High Sensitivity 11 <=14 ng/L   Magnesium   Result Value Ref Range    Magnesium 2.1 1.7 - 2.3 mg/dL   [place] Draw    Narrative    The following orders were created for panel order [place] Draw.  Procedure                               Abnormality         Status                     ---------                               -----------         ------                     Extra Blue Top Tube[[id number]]                              Final result               Extra Red Top Tube[[id number]]                               Final result                 Please view results for these tests on the individual orders.   CBC with platelets and differential   Result Value Ref Range    WBC Count 9.0 4.0 - 11.0 10e3/uL    RBC Count 4.57 3.80 - 5.20 10e6/uL    Hemoglobin 14.2 11.7 - 15.7 g/dL    Hematocrit 44.3 35.0 - 47.0 %    MCV 97 78 - 100 fL    MCH 31.1 26.5 - 33.0 pg    MCHC 32.1 31.5 - 36.5 g/dL    RDW 14.9 10.0 - 15.0 %    Platelet Count 141 (L) 150 - 450 10e3/uL    % Neutrophils 67 %    % Lymphocytes 21 %    % Monocytes 8 %    % Eosinophils 3 %    % Basophils 1 %    % Immature Granulocytes 0 %    NRBCs per 100 WBC 0 <1 /100    Absolute Neutrophils 6.0 1.6 - 8.3 10e3/uL    Absolute Lymphocytes 1.9 0.8 - 5.3 10e3/uL    Absolute Monocytes 0.7 0.0 - 1.3 10e3/uL    Absolute Eosinophils 0.3 0.0 - 0.7 10e3/uL    Absolute Basophils 0.1 0.0 - 0.2 10e3/uL    Absolute Immature Granulocytes 0.0 <=0.4 10e3/uL    Absolute NRBCs 0.0 10e3/uL   Extra Blue Top Tube   Result  Value Ref Range    Hold Specimen x    Extra Red Top Tube   Result Value Ref Range    Hold Specimen x    Glucose by meter   Result Value Ref Range    GLUCOSE BY METER POCT 133 (H) 70 - 99 mg/dL   Influenza A/B, RSV and SARS-CoV2 PCR (COVID-19) Nose    Specimen: Nose; Swab   Result Value Ref Range    Influenza A PCR Negative Negative    Influenza B PCR Negative Negative    RSV PCR Negative Negative    SARS CoV2 PCR Negative Negative    Narrative    Testing was performed using the Xpert Xpress CoV2/Flu/RSV Assay on the [employer] GeneXpert Instrument. This test should be ordered for the detection of SARS-CoV2, influenza, and RSV viruses in individuals with signs and symptoms of respiratory tract infection. This test is for in vitro diagnostic use under the [place] FDA for laboratories certified under CLIA to perform high or moderate complexity testing. This test has been [place] FDA cleared. A negative result does not rule out the presence of PCR inhibitors in the specimen or target RNA in concentration below the limit of detection for the assay. If only one viral target is positive but coinfection with multiple targets is suspected, the sample should be re-tested with another FDA cleared, approved, or authorized test, if coninfection would change clinical management. This test was validated by the [facility]. These laboratories are certified under the Clinical Laboratory Improvement Amendments of 1988 (CLIA-88) as qualified to perfom high complexity laboratory testing.   XR Chest 2 Views    Narrative    EXAM: XR CHEST 2 VIEWS  LOCATION: [facility]  DATE: 1/29/2025    INDICATION: chest pain  COMPARISON: 2/1/2013      Impression    IMPRESSION: Hyperinflation versus deep inspiration changes. Clinical correlation for air trapping.  Heart size and pulmonary vascularity within normal limits. No focal lung infiltrates. Osseous structures grossly intact. Visualized upper abdomen   unremarkable.  "  UA with Microscopic reflex to Culture    Specimen: Urine, Midstream   Result Value Ref Range    Color Urine Light Yellow Colorless, Straw, Light Yellow, Yellow    Appearance Urine Clear Clear    Glucose Urine Negative Negative mg/dL    Bilirubin Urine Negative Negative    Ketones Urine Negative Negative mg/dL    Specific Gravity Urine 1.018 1.000 - 1.030    Blood Urine Negative Negative    pH Urine 6.0 5.0 - 9.0    Protein Albumin Urine Negative Negative mg/dL    Urobilinogen Urine Normal Normal, 2.0 mg/dL    Nitrite Urine Negative Negative    Leukocyte Esterase Urine Large (A) Negative    Bacteria Urine Few (A) None Seen /HPF    Mucus Urine Present (A) None Seen /LPF    RBC Urine 6 (H) <=2 /HPF    WBC Urine 13 (H) <=5 /HPF    Squamous Epithelials Urine 4 (H) <=1 /HPF    Hyaline Casts Urine 2 <=2 /LPF    Narrative    Urine Culture ordered based on laboratory criteria       Medications   ondansetron (ZOFRAN) injection 4 mg (4 mg Intravenous $Given 1/29/25 1838)   famotidine (PEPCID) injection 20 mg (20 mg Intravenous $Given 1/29/25 1845)   sodium chloride 0.9% BOLUS 1,000 mL (1,000 mLs Intravenous Not Given 1/29/25 2007)       Assessments & Plan (with Medical Decision Making)  Mervat Lockett is a 89 year old female who presents to the ER today with her . History is provided by , as patient does not have much for a short term memory. Hx Dementia  She has been vomiting since Saturday.  She has had decreased intake.  Sometimes when she has had a vomiting episode she is complaining of chest pain.  At one point in time she got sweaty and had chills.  She has been experiencing dizziness with standing. Vomit is sometimes liquid, sometimes food, per .   Tells me that she has chest \"discomfort\" and that she feels hungry and tired. She is not sure if she vomited today.   Denies fever, diarrhea, abdominal pain, urinary symptoms. Denies shortness of breath.   She tried Zofran, 2 doses without much relief " of symptoms. Hasn't been able to take her home medications the last two days due to vomiting.   She is alert, orientated, non-distressed. She is very pleasant. Well dressed. Non-toxic, non distressed. Abdomen soft, non-tender.   She is vitally stable here, afebrile.  Per  patient is at her baseline mentally with her dementia.  Differential diagnosis includes, but not limited to: broad. ACS, GERD, PUD, pancreatitis, gall bladder disease, gastroenteritis, viral infection, SBO  She has had a hysterectomy. No other prior abdominal surgeries. History hiatal hernia. Recently stopped PPI at clinic appointment   EKG stable  CBC: Stable, CMP: stable, renal function near baseline, normal LFT's, bilirubin, electrolytes, Magnesium: normal Lipase: normal Troponin: 11 normal.  Urinalysis today is contaminated, with large leukocytes which is being seen in previous, she is Few bacteria 13 WBC-culture pending; she has no recent positive urine culture  Chest xray stable.  Workup today is stable for acute emergent causes to patient's symptoms today.  Symptoms may be related to acute gastroenteritis.   We gave her zofran, pepcid.  Throughout patient's stay she has been stable, had 1 episode of nausea, but did not vomit.  She has been able to keep fluids down here.  We did attempt to give her IV fluid however she removed her IV.  Patient and her  feel comfortable discharging home Advised returning if new or worsening concerns,that they follow-up in the clinic for reevaluation.  Patient discharged home with her  in stable condition verbal understanding of discharge instructions were given.     I have reviewed the nursing notes.    I have reviewed the findings, diagnosis, plan and need for follow up with the patient.    Medical Decision Making  The patient's presentation was of moderate complexity (an undiagnosed new problem with uncertain prognosis).    The patient's evaluation involved:  review of external note(s)  from 1 sources (see separate area of note for details)  review of 3+ test result(s) ordered prior to this encounter (see separate area of note for details)  ordering and/or review of 3+ test(s) in this encounter (see separate area of note for details)    The patient's management necessitated moderate risk (prescription drug management including medications given in the ED).      Discharge Medication List as of 1/29/2025  8:54 PM          Final diagnoses:   Vomiting       1/29/2025   [facility] AND [facility], ALANA CNP  01/29/25 9224

## 2025-01-29 NOTE — ED TRIAGE NOTES
"Pt presents to ED via private car. Pt isn't sure why she's here, but her  says she's been vomiting 10-15 times per day since Saturday and she has been c/o upper abd pain. Pt has no memory of events. BP (!) 142/81   Pulse 108   Temp 97.8  F (36.6  C) (Tympanic)   Resp 20   Ht 1.549 m (5' 1\")   Wt 73.5 kg (162 lb)   LMP  (LMP Unknown)   SpO2 95%   BMI 30.61 kg/m         Triage Assessment (Adult)       Row Name 01/29/25 1558          Triage Assessment    Airway WDL WDL        Respiratory WDL    Respiratory WDL WDL        Skin Circulation/Temperature WDL    Skin Circulation/Temperature WDL WDL        Cardiac WDL    Cardiac WDL WDL        Peripheral/Neurovascular WDL    Peripheral Neurovascular WDL WDL        Cognitive/Neuro/Behavioral WDL    Cognitive/Neuro/Behavioral WDL X;orientation     Level of Consciousness confused     Orientation disoriented to;time;situation                     "

## 2025-01-29 NOTE — TELEPHONE ENCOUNTER
Spoke with patient and patient gave verbal consent to speak with  .    Patients  states that patient has been vomiting/dry heaves since Saturday afternoon.  States about 6-8 episodes a day.  States he appetite and food intake has decreased a lot.  States she has been drinking fluids.  States she has had intermittent chest pain states is last for some time she rates a 7/10.  States chest pain is fairly constant.  States she does not have chest pain at this time.  States it has been hard to get her to keep any of her meds down.  States she has had some episodes of sweat on her face.     states he has been giving patient some Zofran for the nausea.    Patient and  advised to present to ER and verbalized understanding.  Kira Rossi RN on 1/29/2025 at 3:28 PM        Reason for Disposition   Patient sounds very sick or weak to the triager    Additional Information   Negative: SEVERE difficulty breathing (e.g., struggling for each breath, speaks in single words)   Negative: Difficult to awaken or acting confused (e.g., disoriented, slurred speech)   Negative: Shock suspected (e.g., cold/pale/clammy skin, too weak to stand, low BP, rapid pulse)   Negative: Passed out (i.e., lost consciousness, collapsed and was not responding)   Negative: Chest pain lasting longer than 5 minutes and ANY of the following:         Pain is crushing, pressure-like, or heavy         Over 44 years old          Over 30 years old and one cardiac risk factor (e.g diabetes, high blood pressure, high cholesterol, smoker, or family history of heart disease)         History of heart disease (e.g. angina, heart attack, heart failure, bypass surgery, takes nitroglycerin)   Negative: Heart beating < 50 beats per minute OR > 140 beats per minute   Negative: Visible sweat on face or sweat dripping down face   Negative: Sounds like a life-threatening emergency to the triager   Negative: Followed an injury to chest   Negative:  "SEVERE chest pain   Negative: Pain also in shoulder(s) or arm(s) or jaw   Negative: Difficulty breathing   Negative: Cocaine use within last 3 days   Negative: Major surgery in the past month   Negative: Hip or leg fracture (broken bone) in past month (or had cast on leg or ankle in past month)   Negative: Illness requiring prolonged bedrest in past month (e.g., immobilization, long hospital stay)   Negative: Long-distance travel in past month (e.g., car, bus, train, plane; with trip lasting 6 or more hours)   Negative: History of prior 'blood clot' in leg or lungs (i.e., deep vein thrombosis, pulmonary embolism)   Negative: History of inherited increased risk of blood clots (e.g., Factor 5 Leiden, Anti-thrombin 3, Protein C or Protein S deficiency, Prothrombin mutation)   Negative: Cancer treatment in the past two months (or has cancer now)   Negative: Heart beating irregularly or very rapidly   Negative: Chest pain lasting longer than 5 minutes and occurred in last 3 days (72 hours) (Exception: Feels exactly the same as previously diagnosed heartburn and has accompanying sour taste in mouth.)   Negative: Chest pain or 'angina' comes and goes and is happening more often (increasing in frequency) or getting worse (increasing in severity) (Exception: Chest pains that last only a few seconds.)   Negative: Dizziness or lightheadedness   Negative: Coughing up blood    Answer Assessment - Initial Assessment Questions  1. LOCATION: \"Where does it hurt?\"        Mid chest   2. RADIATION: \"Does the pain go anywhere else?\" (e.g., into neck, jaw, arms, back)      denies  3. ONSET: \"When did the chest pain begin?\" (Minutes, hours or days)       Saturday afternoon  4. PATTERN: \"Does the pain come and go, or has it been constant since it started?\"  \"Does it get worse with exertion?\"       Fairly constant  5. DURATION: \"How long does it last\" (e.g., seconds, minutes, hours)      Varies, fairly constant  6. SEVERITY: \"How bad is " "the pain?\"  (e.g., Scale 1-10; mild, moderate, or severe)     - MILD (1-3): doesn't interfere with normal activities      - MODERATE (4-7): interferes with normal activities or awakens from sleep     - SEVERE (8-10): excruciating pain, unable to do any normal activities         No pain now, but does get up to 7/10  7. CARDIAC RISK FACTORS: \"Do you have any history of heart problems or risk factors for heart disease?\" (e.g., angina, prior heart attack; diabetes, high blood pressure, high cholesterol, smoker, or strong family history of heart disease)        8. PULMONARY RISK FACTORS: \"Do you have any history of lung disease?\"  (e.g., blood clots in lung, asthma, emphysema, birth control pills)      denies  9. CAUSE: \"What do you think is causing the chest pain?\"      Acid reflux  10. OTHER SYMPTOMS: \"Do you have any other symptoms?\" (e.g., dizziness, nausea, vomiting, sweating, fever, difficulty breathing, cough)        Nausea, vomiting,some occ with sweating and chill  11. PREGNANCY: \"Is there any chance you are pregnant?\" \"When was your last menstrual period?\"        N/a    Protocols used: Chest Pain-A-[place]    "

## 2025-01-30 NOTE — DISCHARGE INSTRUCTIONS
Mervat,   Nice to meet you.   Lab work, EKG look ok today.   I am glad you are feeling better here.   Urine culture pending-will call you if you need to get antibiotics for positive culture this may be a day or 2.  Follow-up in the clinic for recheck with your primary care provider for further evaluation and treatment.  As we discussed may be related to a gastroenteritis, or gastric reflux.  I would discuss with your doctor if she would like to continue with medications for this.  Be seen if new or worsening concerns increased pain vomiting unable to keep things down fever confusion

## 2025-01-31 ENCOUNTER — TELEPHONE (OUTPATIENT)
Dept: FAMILY MEDICINE | Facility: OTHER | Age: OVER 89
End: 2025-01-31
Payer: COMMERCIAL

## 2025-01-31 LAB — BACTERIA UR CULT: NORMAL

## 2025-01-31 NOTE — RESULT ENCOUNTER NOTE
Final urine culture report is negative.  Adult: Negative urine culture parameters per protocol: Any # urogenital binh, single or mixed   [facility] Emergency Dept discharge antibiotic prescribed (If applicable): None  Treatment recommendations per [facility] ED Lab Result Urine Culture protocol: No change in plan of care.

## 2025-01-31 NOTE — TELEPHONE ENCOUNTER
Patient was in ER 1/29/25 and is scheduled 2/6 for ER follow up, does TYP want patient to keep at for follow up?   Janice Fiore LPN .............1/31/2025     9:42 AM

## 2025-01-31 NOTE — TELEPHONE ENCOUNTER
Patient has severe acid reflux and was in the ED the other night.   Patient is wondering if TYP wants to see her.     Thank you   Mirian Fernandez on 1/31/2025 at 9:18 AM

## 2025-02-01 LAB
ATRIAL RATE - MUSE: 85 BPM
DIASTOLIC BLOOD PRESSURE - MUSE: NORMAL MMHG
INTERPRETATION ECG - MUSE: NORMAL
P AXIS - MUSE: 55 DEGREES
PR INTERVAL - MUSE: 166 MS
QRS DURATION - MUSE: 66 MS
QT - MUSE: 364 MS
QTC - MUSE: 433 MS
R AXIS - MUSE: -13 DEGREES
SYSTOLIC BLOOD PRESSURE - MUSE: NORMAL MMHG
T AXIS - MUSE: 33 DEGREES
VENTRICULAR RATE- MUSE: 85 BPM

## 2025-02-04 NOTE — TELEPHONE ENCOUNTER
Spoke to patient spouse notified will keep appt, they would teri to try to find out what is causing.   Janice Fiore LPN .............2/4/2025     2:15 PM

## 2025-02-06 ENCOUNTER — OFFICE VISIT (OUTPATIENT)
Dept: FAMILY MEDICINE | Facility: OTHER | Age: OVER 89
End: 2025-02-06
Attending: FAMILY MEDICINE
Payer: MEDICARE

## 2025-02-06 VITALS
SYSTOLIC BLOOD PRESSURE: 136 MMHG | HEART RATE: 89 BPM | BODY MASS INDEX: 30.4 KG/M2 | OXYGEN SATURATION: 95 % | TEMPERATURE: 97.9 F | WEIGHT: 161 LBS | HEIGHT: 61 IN | DIASTOLIC BLOOD PRESSURE: 74 MMHG | RESPIRATION RATE: 18 BRPM

## 2025-02-06 DIAGNOSIS — R11.0 NAUSEA: ICD-10-CM

## 2025-02-06 DIAGNOSIS — E11.8 TYPE 2 DIABETES MELLITUS WITH COMPLICATION, WITHOUT LONG-TERM CURRENT USE OF INSULIN (H): ICD-10-CM

## 2025-02-06 DIAGNOSIS — K21.9 GASTROESOPHAGEAL REFLUX DISEASE WITHOUT ESOPHAGITIS: Primary | ICD-10-CM

## 2025-02-06 PROCEDURE — G0463 HOSPITAL OUTPT CLINIC VISIT: HCPCS

## 2025-02-06 RX ORDER — SUCRALFATE ORAL 1 G/10ML
1 SUSPENSION ORAL 2 TIMES DAILY
Qty: 414 ML | Refills: 0 | Status: SHIPPED | OUTPATIENT
Start: 2025-02-06

## 2025-02-06 RX ORDER — ONDANSETRON 4 MG/1
4 TABLET, ORALLY DISINTEGRATING ORAL EVERY 8 HOURS PRN
Qty: 20 TABLET | Refills: 0 | Status: SHIPPED | OUTPATIENT
Start: 2025-02-06

## 2025-02-06 ASSESSMENT — PAIN SCALES - GENERAL: PAINLEVEL_OUTOF10: MODERATE PAIN (4)

## 2025-02-06 NOTE — PROGRESS NOTES
"  {PROVIDER CHARTING PREFERENCE:041582}    Roseanna Philippe is a 89 year old, presenting for the following health issues:  ER F/U, Gastrophageal Reflux, and Recheck Medication      89 yo female presents for recheck after ER visit.      History of Present Illness       Back Pain:  She presents for follow up of back pain. Patient's back pain is a recurring problem.  Location of back pain:  Left lower back  Description of back pain: sharp  Back pain spreads: nowhere    Since patient first noticed back pain, pain is: gradually worsening  Does back pain interfere with her job:  Not applicable       Hypertension: She presents for follow up of hypertension.  She does not check blood pressure  regularly outside of the clinic. Outside blood pressures have been over 140/90. She does not follow a low salt diet.     She eats 0-1 servings of fruits and vegetables daily.She consumes 3 sweetened beverage(s) daily.She exercises with enough effort to increase her heart rate 9 or less minutes per day.  She exercises with enough effort to increase her heart rate 3 or less days per week. She is missing 1 dose(s) of medications per week.  She is not taking prescribed medications regularly due to remembering to take.       {MA/LPN/RN Pre-Provider Visit Orders- hCG/UA/Strep (Optional):139451}  {SUPERLIST (Optional):116426}  {additonal problems for provider to add (Optional):351123}    {ROS Picklists (Optional):621547}      Objective    /74   Pulse 89   Temp 97.9  F (36.6  C) (Tympanic)   Resp 18   Ht 1.549 m (5' 1\")   Wt 73 kg (161 lb)   LMP  (LMP Unknown)   SpO2 95%   Breastfeeding No   BMI 30.42 kg/m    Body mass index is 30.42 kg/m .  Physical Exam   {Exam List (Optional):501833}    {Diagnostic Test Results (Optional):226208}        Signed Electronically by: Skye Mosley MD  {Email feedback regarding this note to [email or web address]   :087354}  " "      Hypertension: She presents for follow up of hypertension.  She does not check blood pressure  regularly outside of the clinic. Outside blood pressures have been over 140/90. She does not follow a low salt diet.     She eats 0-1 servings of fruits and vegetables daily.She consumes 3 sweetened beverage(s) daily.She exercises with enough effort to increase her heart rate 9 or less minutes per day.  She exercises with enough effort to increase her heart rate 3 or less days per week. She is missing 1 dose(s) of medications per week.  She is not taking prescribed medications regularly due to remembering to take.                 Review of Systems  Constitutional, HEENT, cardiovascular, pulmonary, gi and gu systems are negative, except as otherwise noted.      Objective    /74   Pulse 89   Temp 97.9  F (36.6  C) (Tympanic)   Resp 18   Ht 1.549 m (5' 1\")   Wt 73 kg (161 lb)   LMP  (LMP Unknown)   SpO2 95%   Breastfeeding No   BMI 30.42 kg/m    Body mass index is 30.42 kg/m .  Physical Exam   GENERAL: alert and no distress  NECK: no adenopathy, no asymmetry, masses, or scars  RESP: lungs clear to auscultation - no rales, rhonchi or wheezes  CV: regular rate and rhythm, normal S1 S2, no S3 or S4, no murmur, click or rub, no peripheral edema  ABDOMEN: soft, nontender, no hepatosplenomegaly, no masses and bowel sounds normal  PSYCH: Pleasantly demented.    No results found for any visits on 02/06/25.        Signed Electronically by: Skye Mosley MD    "

## 2025-02-06 NOTE — NURSING NOTE
Patient is here with spouse to follow up from recent ER visit, states has been having symptoms still since visit. States was having pain and not feeling well.     No LMP recorded (lmp unknown). Patient has had a hysterectomy.  Medication Reconciliation: complete    Janice Fiore LPN 2/6/2025 2:06 PM       Advance care directive on file? yes  Advance care directive provided to patient? na       Janice Fiore LPN

## 2025-04-05 ENCOUNTER — HEALTH MAINTENANCE LETTER (OUTPATIENT)
Age: OVER 89
End: 2025-04-05

## 2025-07-19 ENCOUNTER — HEALTH MAINTENANCE LETTER (OUTPATIENT)
Age: OVER 89
End: 2025-07-19

## 2025-08-09 ENCOUNTER — HEALTH MAINTENANCE LETTER (OUTPATIENT)
Age: OVER 89
End: 2025-08-09

## 2025-08-22 DIAGNOSIS — F03.90 DEMENTIA WITHOUT BEHAVIORAL DISTURBANCE (H): ICD-10-CM

## 2025-08-25 RX ORDER — MEMANTINE HYDROCHLORIDE 10 MG/1
10 TABLET ORAL 2 TIMES DAILY
Qty: 180 TABLET | Refills: 0 | Status: SHIPPED | OUTPATIENT
Start: 2025-08-25

## (undated) DEVICE — DRSG GAUZE 4X4" 3033

## (undated) DEVICE — GLOVE PROTEXIS BLUE W/NEU-THERA 8.5  2D73EB85

## (undated) DEVICE — PREP CHLORAPREP 26ML TINTED ORANGE  260815

## (undated) DEVICE — PACK MAJOR EXTREMITY SOP15MEFCA

## (undated) DEVICE — GLOVE PROTEXIS POWDER FREE SMT 8.0  2D72PT80X

## (undated) DEVICE — ESU GROUND PAD ADULT W/CORD E7507

## (undated) DEVICE — TOURNIQUET SGL BLADDER 18"X4" RED 5921-218-135

## (undated) DEVICE — DRSG XEROFORM 1X8"

## (undated) DEVICE — SOL WATER 1500ML

## (undated) DEVICE — SPLINT SCOTCHCAST 2X10" 72210

## (undated) DEVICE — DRAPE STERI TOWEL LG 1010

## (undated) DEVICE — SLEEVE COMPRESSION SCD KNEE MED 74022

## (undated) DEVICE — SU MONOCRYL 3-0 PS-2 18" UND Y497G

## (undated) DEVICE — SU ETHILON 3-0 FS-1 18" 669H

## (undated) DEVICE — IMM ALUMI HAND XLG 761

## (undated) RX ORDER — BUPIVACAINE HYDROCHLORIDE 2.5 MG/ML
INJECTION, SOLUTION EPIDURAL; INFILTRATION; INTRACAUDAL
Status: DISPENSED
Start: 2018-04-25

## (undated) RX ORDER — PROPOFOL 10 MG/ML
INJECTION, EMULSION INTRAVENOUS
Status: DISPENSED
Start: 2018-04-25

## (undated) RX ORDER — LIDOCAINE HYDROCHLORIDE AND EPINEPHRINE 10; 10 MG/ML; UG/ML
INJECTION, SOLUTION INFILTRATION; PERINEURAL
Status: DISPENSED
Start: 2018-04-25

## (undated) RX ORDER — PANTOPRAZOLE SODIUM 40 MG/1
TABLET, DELAYED RELEASE ORAL
Status: DISPENSED
Start: 2018-04-25

## (undated) RX ORDER — ONDANSETRON 2 MG/ML
INJECTION INTRAMUSCULAR; INTRAVENOUS
Status: DISPENSED
Start: 2018-04-25

## (undated) RX ORDER — CEFAZOLIN SODIUM 2 G/100ML
INJECTION, SOLUTION INTRAVENOUS
Status: DISPENSED
Start: 2018-04-25

## (undated) RX ORDER — FENTANYL CITRATE 50 UG/ML
INJECTION, SOLUTION INTRAMUSCULAR; INTRAVENOUS
Status: DISPENSED
Start: 2018-04-25

## (undated) RX ORDER — ACETAMINOPHEN 325 MG/1
TABLET ORAL
Status: DISPENSED
Start: 2018-04-25

## (undated) RX ORDER — LORAZEPAM 1 MG/1
TABLET ORAL
Status: DISPENSED
Start: 2018-07-09

## (undated) RX ORDER — ONDANSETRON 2 MG/ML
INJECTION INTRAMUSCULAR; INTRAVENOUS
Status: DISPENSED
Start: 2025-01-29

## (undated) RX ORDER — LIDOCAINE HYDROCHLORIDE 10 MG/ML
INJECTION, SOLUTION EPIDURAL; INFILTRATION; INTRACAUDAL; PERINEURAL
Status: DISPENSED
Start: 2018-04-25

## (undated) RX ORDER — LIDOCAINE HYDROCHLORIDE 20 MG/ML
INJECTION, SOLUTION EPIDURAL; INFILTRATION; INTRACAUDAL; PERINEURAL
Status: DISPENSED
Start: 2018-04-25